# Patient Record
Sex: MALE | Race: WHITE | Employment: OTHER | URBAN - NONMETROPOLITAN AREA
[De-identification: names, ages, dates, MRNs, and addresses within clinical notes are randomized per-mention and may not be internally consistent; named-entity substitution may affect disease eponyms.]

---

## 2017-05-17 ENCOUNTER — PREP FOR PROCEDURE (OUTPATIENT)
Dept: SURGERY | Age: 67
End: 2017-05-17

## 2017-05-17 ENCOUNTER — OFFICE VISIT (OUTPATIENT)
Dept: SURGERY | Age: 67
End: 2017-05-17
Payer: MEDICARE

## 2017-05-17 VITALS
DIASTOLIC BLOOD PRESSURE: 70 MMHG | WEIGHT: 192 LBS | HEIGHT: 66 IN | BODY MASS INDEX: 30.86 KG/M2 | SYSTOLIC BLOOD PRESSURE: 120 MMHG | TEMPERATURE: 99 F

## 2017-05-17 DIAGNOSIS — N18.5 CKD (CHRONIC KIDNEY DISEASE) STAGE 5, GFR LESS THAN 15 ML/MIN (HCC): Primary | ICD-10-CM

## 2017-05-17 PROCEDURE — G8419 CALC BMI OUT NRM PARAM NOF/U: HCPCS | Performed by: PHYSICIAN ASSISTANT

## 2017-05-17 PROCEDURE — G8598 ASA/ANTIPLAT THER USED: HCPCS | Performed by: PHYSICIAN ASSISTANT

## 2017-05-17 PROCEDURE — G8427 DOCREV CUR MEDS BY ELIG CLIN: HCPCS | Performed by: PHYSICIAN ASSISTANT

## 2017-05-17 PROCEDURE — 4004F PT TOBACCO SCREEN RCVD TLK: CPT | Performed by: PHYSICIAN ASSISTANT

## 2017-05-17 PROCEDURE — 3017F COLORECTAL CA SCREEN DOC REV: CPT | Performed by: PHYSICIAN ASSISTANT

## 2017-05-17 PROCEDURE — 99203 OFFICE O/P NEW LOW 30 MIN: CPT | Performed by: PHYSICIAN ASSISTANT

## 2017-05-17 RX ORDER — CALCITRIOL 0.5 UG/1
0.5 CAPSULE, LIQUID FILLED ORAL
COMMUNITY
Start: 2016-10-26 | End: 2017-06-07 | Stop reason: ALTCHOICE

## 2017-05-17 RX ORDER — NITROGLYCERIN 0.4 MG/1
0.4 TABLET SUBLINGUAL
COMMUNITY
Start: 2015-02-03 | End: 2017-05-17

## 2017-05-17 RX ORDER — RANITIDINE 300 MG/1
300 TABLET ORAL DAILY
COMMUNITY
Start: 2016-11-02 | End: 2020-01-11

## 2017-05-17 RX ORDER — ALLOPURINOL 100 MG/1
100 TABLET ORAL 2 TIMES DAILY
COMMUNITY
Start: 2016-10-26

## 2017-05-17 RX ORDER — CLOPIDOGREL BISULFATE 75 MG/1
75 TABLET ORAL
COMMUNITY
Start: 2016-10-10 | End: 2017-06-07 | Stop reason: ALTCHOICE

## 2017-05-17 RX ORDER — AMLODIPINE BESYLATE 10 MG/1
5 TABLET ORAL DAILY
COMMUNITY
Start: 2016-11-12

## 2017-05-17 RX ORDER — SODIUM CHLORIDE, SODIUM LACTATE, POTASSIUM CHLORIDE, CALCIUM CHLORIDE 600; 310; 30; 20 MG/100ML; MG/100ML; MG/100ML; MG/100ML
INJECTION, SOLUTION INTRAVENOUS CONTINUOUS
Status: CANCELLED | OUTPATIENT
Start: 2017-05-17

## 2017-05-17 RX ORDER — SODIUM CHLORIDE 0.9 % (FLUSH) 0.9 %
10 SYRINGE (ML) INJECTION PRN
Status: CANCELLED | OUTPATIENT
Start: 2017-05-17

## 2017-05-17 RX ORDER — CARVEDILOL 12.5 MG/1
12.5 TABLET ORAL 2 TIMES DAILY WITH MEALS
COMMUNITY
Start: 2016-10-10

## 2017-05-17 RX ORDER — CLONIDINE HYDROCHLORIDE 0.1 MG/1
0.1 TABLET ORAL DAILY
COMMUNITY
Start: 2016-10-10 | End: 2017-07-14 | Stop reason: ALTCHOICE

## 2017-05-17 RX ORDER — ATORVASTATIN CALCIUM 40 MG/1
40 TABLET, FILM COATED ORAL DAILY
COMMUNITY
Start: 2016-11-12 | End: 2020-01-11

## 2017-05-17 RX ORDER — MINOXIDIL 2.5 MG/1
2.5 TABLET ORAL 2 TIMES DAILY
COMMUNITY
Start: 2016-10-10 | End: 2020-02-26

## 2017-05-17 RX ORDER — ERGOCALCIFEROL 1.25 MG/1
1.25 CAPSULE ORAL
COMMUNITY
Start: 2016-10-26 | End: 2018-07-16 | Stop reason: SDUPTHER

## 2017-05-17 RX ORDER — SODIUM CHLORIDE 0.9 % (FLUSH) 0.9 %
10 SYRINGE (ML) INJECTION EVERY 12 HOURS SCHEDULED
Status: CANCELLED | OUTPATIENT
Start: 2017-05-17

## 2017-05-17 RX ORDER — GABAPENTIN 300 MG/1
300 CAPSULE ORAL 2 TIMES DAILY
Status: ON HOLD | COMMUNITY
Start: 2016-10-10 | End: 2017-12-08 | Stop reason: ALTCHOICE

## 2017-05-22 ENCOUNTER — HOSPITAL ENCOUNTER (OUTPATIENT)
Dept: PREADMISSION TESTING | Age: 67
Discharge: HOME | End: 2017-05-22
Payer: COMMERCIAL

## 2017-05-22 VITALS — WEIGHT: 190 LBS | HEIGHT: 66 IN | BODY MASS INDEX: 30.53 KG/M2

## 2017-05-22 LAB
ANION GAP SERPL CALCULATED.3IONS-SCNC: 14 MMOL/L (ref 7–19)
BASOPHILS ABSOLUTE: 0 K/UL (ref 0–0.2)
BASOPHILS RELATIVE PERCENT: 0.7 % (ref 0–1)
BUN BLDV-MCNC: 42 MG/DL (ref 8–23)
CALCIUM SERPL-MCNC: 10.8 MG/DL (ref 8.8–10.2)
CHLORIDE BLD-SCNC: 107 MMOL/L (ref 98–111)
CO2: 25 MMOL/L (ref 22–29)
CREAT SERPL-MCNC: 5.1 MG/DL (ref 0.5–1.2)
EOSINOPHILS ABSOLUTE: 0.1 K/UL (ref 0–0.6)
EOSINOPHILS RELATIVE PERCENT: 1.6 % (ref 0–5)
GFR NON-AFRICAN AMERICAN: 11
GLUCOSE BLD-MCNC: 145 MG/DL (ref 74–109)
HCT VFR BLD CALC: 37.5 % (ref 42–52)
HEMOGLOBIN: 12.5 G/DL (ref 14–18)
LYMPHOCYTES ABSOLUTE: 1.4 K/UL (ref 1.1–4.5)
LYMPHOCYTES RELATIVE PERCENT: 23.3 % (ref 20–40)
MCH RBC QN AUTO: 32.3 PG (ref 27–31)
MCHC RBC AUTO-ENTMCNC: 33.3 G/DL (ref 33–37)
MCV RBC AUTO: 96.9 FL (ref 80–94)
MONOCYTES ABSOLUTE: 0.6 K/UL (ref 0–0.9)
MONOCYTES RELATIVE PERCENT: 9.2 % (ref 0–10)
NEUTROPHILS ABSOLUTE: 4 K/UL (ref 1.5–7.5)
NEUTROPHILS RELATIVE PERCENT: 64.7 % (ref 50–65)
PDW BLD-RTO: 14.6 % (ref 11.5–14.5)
PLATELET # BLD: 101 K/UL (ref 130–400)
PMV BLD AUTO: 12.3 FL (ref 7.4–10.4)
POTASSIUM SERPL-SCNC: 3.7 MMOL/L (ref 3.5–5)
RBC # BLD: 3.87 M/UL (ref 4.7–6.1)
SODIUM BLD-SCNC: 146 MMOL/L (ref 136–145)
WBC # BLD: 6.1 K/UL (ref 4.8–10.8)

## 2017-05-22 PROCEDURE — 85025 COMPLETE CBC W/AUTO DIFF WBC: CPT

## 2017-05-22 PROCEDURE — 80048 BASIC METABOLIC PNL TOTAL CA: CPT

## 2017-05-22 PROCEDURE — 93005 ELECTROCARDIOGRAM TRACING: CPT

## 2017-05-23 LAB
EKG P AXIS: NORMAL DEGREES
EKG P-R INTERVAL: NORMAL MS
EKG Q-T INTERVAL: 438 MS
EKG QRS DURATION: 160 MS
EKG QTC CALCULATION (BAZETT): 446 MS
EKG T AXIS: 110 DEGREES

## 2017-05-26 ENCOUNTER — ANESTHESIA (OUTPATIENT)
Dept: OPERATING ROOM | Age: 67
End: 2017-05-26
Payer: COMMERCIAL

## 2017-05-26 ENCOUNTER — ANESTHESIA EVENT (OUTPATIENT)
Dept: OPERATING ROOM | Age: 67
End: 2017-05-26
Payer: COMMERCIAL

## 2017-05-26 ENCOUNTER — HOSPITAL ENCOUNTER (OUTPATIENT)
Age: 67
Setting detail: OUTPATIENT SURGERY
Discharge: HOME | End: 2017-05-26
Attending: SURGERY | Admitting: SURGERY
Payer: COMMERCIAL

## 2017-05-26 VITALS
BODY MASS INDEX: 30.53 KG/M2 | SYSTOLIC BLOOD PRESSURE: 124 MMHG | TEMPERATURE: 98.3 F | RESPIRATION RATE: 16 BRPM | WEIGHT: 190 LBS | HEIGHT: 66 IN | OXYGEN SATURATION: 90 % | DIASTOLIC BLOOD PRESSURE: 62 MMHG | HEART RATE: 60 BPM

## 2017-05-26 VITALS
OXYGEN SATURATION: 97 % | DIASTOLIC BLOOD PRESSURE: 67 MMHG | SYSTOLIC BLOOD PRESSURE: 118 MMHG | RESPIRATION RATE: 1 BRPM | TEMPERATURE: 98 F

## 2017-05-26 PROBLEM — N18.5 CHRONIC KIDNEY DISEASE (CKD), STAGE V (HCC): Chronic | Status: ACTIVE | Noted: 2017-05-26

## 2017-05-26 LAB
APTT: 29.4 SEC (ref 26–36.2)
GLUCOSE BLD-MCNC: 96 MG/DL (ref 70–99)
INR BLD: 0.97 (ref 0.88–1.18)
PERFORMED ON: NORMAL
PROTHROMBIN TIME: 12.8 SEC (ref 12–14.6)

## 2017-05-26 PROCEDURE — 7100000001 HC PACU RECOVERY - ADDTL 15 MIN: Performed by: SURGERY

## 2017-05-26 PROCEDURE — 2720000003 HC MISC SUTURE/STAPLES/RELOADS/ETC: Performed by: SURGERY

## 2017-05-26 PROCEDURE — 2500000003 HC RX 250 WO HCPCS: Performed by: SURGERY

## 2017-05-26 PROCEDURE — 82948 REAGENT STRIP/BLOOD GLUCOSE: CPT

## 2017-05-26 PROCEDURE — 3700000001 HC ADD 15 MINUTES (ANESTHESIA): Performed by: SURGERY

## 2017-05-26 PROCEDURE — C1725 CATH, TRANSLUMIN NON-LASER: HCPCS | Performed by: SURGERY

## 2017-05-26 PROCEDURE — 7100000010 HC PHASE II RECOVERY - FIRST 15 MIN: Performed by: SURGERY

## 2017-05-26 PROCEDURE — 85730 THROMBOPLASTIN TIME PARTIAL: CPT

## 2017-05-26 PROCEDURE — 49324 LAP INSERT TUNNEL IP CATH: CPT | Performed by: PHYSICIAN ASSISTANT

## 2017-05-26 PROCEDURE — 6360000002 HC RX W HCPCS: Performed by: SURGERY

## 2017-05-26 PROCEDURE — 7100000000 HC PACU RECOVERY - FIRST 15 MIN: Performed by: SURGERY

## 2017-05-26 PROCEDURE — 49324 LAP INSERT TUNNEL IP CATH: CPT | Performed by: SURGERY

## 2017-05-26 PROCEDURE — 7100000011 HC PHASE II RECOVERY - ADDTL 15 MIN: Performed by: SURGERY

## 2017-05-26 PROCEDURE — C1752 CATH,HEMODIALYSIS,SHORT-TERM: HCPCS | Performed by: SURGERY

## 2017-05-26 PROCEDURE — 6360000002 HC RX W HCPCS: Performed by: NURSE ANESTHETIST, CERTIFIED REGISTERED

## 2017-05-26 PROCEDURE — 2580000003 HC RX 258: Performed by: ANESTHESIOLOGY

## 2017-05-26 PROCEDURE — 36415 COLL VENOUS BLD VENIPUNCTURE: CPT

## 2017-05-26 PROCEDURE — 3600000003 HC SURGERY LEVEL 3 BASE: Performed by: SURGERY

## 2017-05-26 PROCEDURE — 2720000001 HC MISC SURG SUPPLY STERILE $51-500: Performed by: SURGERY

## 2017-05-26 PROCEDURE — 2500000003 HC RX 250 WO HCPCS: Performed by: NURSE ANESTHETIST, CERTIFIED REGISTERED

## 2017-05-26 PROCEDURE — 3600000013 HC SURGERY LEVEL 3 ADDTL 15MIN: Performed by: SURGERY

## 2017-05-26 PROCEDURE — 3700000000 HC ANESTHESIA ATTENDED CARE: Performed by: SURGERY

## 2017-05-26 PROCEDURE — 85610 PROTHROMBIN TIME: CPT

## 2017-05-26 DEVICE — CATHETER PERITONEAL DLYS 35X53 MMX62 CM FLEX-NECK CLASSIC: Type: IMPLANTABLE DEVICE | Site: PERITONEUM | Status: FUNCTIONAL

## 2017-05-26 RX ORDER — SODIUM CHLORIDE 450 MG/100ML
INJECTION, SOLUTION INTRAVENOUS CONTINUOUS
Status: DISCONTINUED | OUTPATIENT
Start: 2017-05-26 | End: 2017-05-26 | Stop reason: HOSPADM

## 2017-05-26 RX ORDER — FENTANYL CITRATE 50 UG/ML
50 INJECTION, SOLUTION INTRAMUSCULAR; INTRAVENOUS
Status: DISCONTINUED | OUTPATIENT
Start: 2017-05-26 | End: 2017-05-26 | Stop reason: HOSPADM

## 2017-05-26 RX ORDER — MORPHINE SULFATE 4 MG/ML
4 INJECTION, SOLUTION INTRAMUSCULAR; INTRAVENOUS
Status: DISCONTINUED | OUTPATIENT
Start: 2017-05-26 | End: 2017-05-26 | Stop reason: HOSPADM

## 2017-05-26 RX ORDER — OXYCODONE HYDROCHLORIDE AND ACETAMINOPHEN 5; 325 MG/1; MG/1
1 TABLET ORAL EVERY 4 HOURS PRN
Status: DISCONTINUED | OUTPATIENT
Start: 2017-05-26 | End: 2017-05-26 | Stop reason: HOSPADM

## 2017-05-26 RX ORDER — LIDOCAINE HYDROCHLORIDE 10 MG/ML
1 INJECTION, SOLUTION EPIDURAL; INFILTRATION; INTRACAUDAL; PERINEURAL
Status: DISCONTINUED | OUTPATIENT
Start: 2017-05-26 | End: 2017-05-26 | Stop reason: HOSPADM

## 2017-05-26 RX ORDER — SODIUM CHLORIDE 0.9 % (FLUSH) 0.9 %
10 SYRINGE (ML) INJECTION PRN
Status: DISCONTINUED | OUTPATIENT
Start: 2017-05-26 | End: 2017-05-26 | Stop reason: HOSPADM

## 2017-05-26 RX ORDER — MORPHINE SULFATE 4 MG/ML
4 INJECTION, SOLUTION INTRAMUSCULAR; INTRAVENOUS EVERY 5 MIN PRN
Status: DISCONTINUED | OUTPATIENT
Start: 2017-05-26 | End: 2017-05-26 | Stop reason: HOSPADM

## 2017-05-26 RX ORDER — HYDRALAZINE HYDROCHLORIDE 20 MG/ML
5 INJECTION INTRAMUSCULAR; INTRAVENOUS EVERY 10 MIN PRN
Status: DISCONTINUED | OUTPATIENT
Start: 2017-05-26 | End: 2017-05-26 | Stop reason: HOSPADM

## 2017-05-26 RX ORDER — SCOLOPAMINE TRANSDERMAL SYSTEM 1 MG/1
1 PATCH, EXTENDED RELEASE TRANSDERMAL
Status: DISCONTINUED | OUTPATIENT
Start: 2017-05-26 | End: 2017-05-26 | Stop reason: HOSPADM

## 2017-05-26 RX ORDER — ROCURONIUM BROMIDE 10 MG/ML
INJECTION, SOLUTION INTRAVENOUS PRN
Status: DISCONTINUED | OUTPATIENT
Start: 2017-05-26 | End: 2017-05-26 | Stop reason: SDUPTHER

## 2017-05-26 RX ORDER — HEPARIN SODIUM,PORCINE 10 UNIT/ML
VIAL (ML) INTRAVENOUS PRN
Status: DISCONTINUED | OUTPATIENT
Start: 2017-05-26 | End: 2017-05-26 | Stop reason: HOSPADM

## 2017-05-26 RX ORDER — MORPHINE SULFATE 4 MG/ML
2 INJECTION, SOLUTION INTRAMUSCULAR; INTRAVENOUS EVERY 5 MIN PRN
Status: DISCONTINUED | OUTPATIENT
Start: 2017-05-26 | End: 2017-05-26 | Stop reason: HOSPADM

## 2017-05-26 RX ORDER — SODIUM CHLORIDE 0.9 % (FLUSH) 0.9 %
10 SYRINGE (ML) INJECTION EVERY 12 HOURS SCHEDULED
Status: DISCONTINUED | OUTPATIENT
Start: 2017-05-26 | End: 2017-05-26 | Stop reason: HOSPADM

## 2017-05-26 RX ORDER — LABETALOL HYDROCHLORIDE 5 MG/ML
5 INJECTION, SOLUTION INTRAVENOUS EVERY 10 MIN PRN
Status: DISCONTINUED | OUTPATIENT
Start: 2017-05-26 | End: 2017-05-26 | Stop reason: HOSPADM

## 2017-05-26 RX ORDER — PROPOFOL 10 MG/ML
INJECTION, EMULSION INTRAVENOUS PRN
Status: DISCONTINUED | OUTPATIENT
Start: 2017-05-26 | End: 2017-05-26 | Stop reason: SDUPTHER

## 2017-05-26 RX ORDER — OXYCODONE HYDROCHLORIDE AND ACETAMINOPHEN 5; 325 MG/1; MG/1
TABLET ORAL
Qty: 15 TABLET | Refills: 0 | Status: SHIPPED | OUTPATIENT
Start: 2017-05-26 | End: 2017-06-07 | Stop reason: ALTCHOICE

## 2017-05-26 RX ORDER — SODIUM CHLORIDE, SODIUM LACTATE, POTASSIUM CHLORIDE, CALCIUM CHLORIDE 600; 310; 30; 20 MG/100ML; MG/100ML; MG/100ML; MG/100ML
INJECTION, SOLUTION INTRAVENOUS CONTINUOUS
Status: DISCONTINUED | OUTPATIENT
Start: 2017-05-26 | End: 2017-05-26 | Stop reason: HOSPADM

## 2017-05-26 RX ORDER — GLYCOPYRROLATE 0.2 MG/ML
INJECTION INTRAMUSCULAR; INTRAVENOUS PRN
Status: DISCONTINUED | OUTPATIENT
Start: 2017-05-26 | End: 2017-05-26 | Stop reason: SDUPTHER

## 2017-05-26 RX ORDER — MEPERIDINE HYDROCHLORIDE 50 MG/ML
12.5 INJECTION INTRAMUSCULAR; INTRAVENOUS; SUBCUTANEOUS EVERY 5 MIN PRN
Status: DISCONTINUED | OUTPATIENT
Start: 2017-05-26 | End: 2017-05-26 | Stop reason: HOSPADM

## 2017-05-26 RX ORDER — METOCLOPRAMIDE HYDROCHLORIDE 5 MG/ML
10 INJECTION INTRAMUSCULAR; INTRAVENOUS
Status: DISCONTINUED | OUTPATIENT
Start: 2017-05-26 | End: 2017-05-26 | Stop reason: HOSPADM

## 2017-05-26 RX ORDER — OXYCODONE HYDROCHLORIDE AND ACETAMINOPHEN 5; 325 MG/1; MG/1
2 TABLET ORAL EVERY 4 HOURS PRN
Status: DISCONTINUED | OUTPATIENT
Start: 2017-05-26 | End: 2017-05-26 | Stop reason: HOSPADM

## 2017-05-26 RX ORDER — PROMETHAZINE HYDROCHLORIDE 25 MG/ML
6.25 INJECTION, SOLUTION INTRAMUSCULAR; INTRAVENOUS
Status: DISCONTINUED | OUTPATIENT
Start: 2017-05-26 | End: 2017-05-26 | Stop reason: HOSPADM

## 2017-05-26 RX ORDER — MIDAZOLAM HYDROCHLORIDE 1 MG/ML
2 INJECTION INTRAMUSCULAR; INTRAVENOUS
Status: DISCONTINUED | OUTPATIENT
Start: 2017-05-26 | End: 2017-05-26 | Stop reason: HOSPADM

## 2017-05-26 RX ORDER — LIDOCAINE HYDROCHLORIDE 10 MG/ML
INJECTION, SOLUTION INFILTRATION; PERINEURAL PRN
Status: DISCONTINUED | OUTPATIENT
Start: 2017-05-26 | End: 2017-05-26 | Stop reason: HOSPADM

## 2017-05-26 RX ORDER — FENTANYL CITRATE 50 UG/ML
INJECTION, SOLUTION INTRAMUSCULAR; INTRAVENOUS PRN
Status: DISCONTINUED | OUTPATIENT
Start: 2017-05-26 | End: 2017-05-26 | Stop reason: SDUPTHER

## 2017-05-26 RX ORDER — DIPHENHYDRAMINE HYDROCHLORIDE 50 MG/ML
12.5 INJECTION INTRAMUSCULAR; INTRAVENOUS
Status: DISCONTINUED | OUTPATIENT
Start: 2017-05-26 | End: 2017-05-26 | Stop reason: HOSPADM

## 2017-05-26 RX ORDER — BUPIVACAINE HYDROCHLORIDE 5 MG/ML
INJECTION, SOLUTION PERINEURAL PRN
Status: DISCONTINUED | OUTPATIENT
Start: 2017-05-26 | End: 2017-05-26 | Stop reason: HOSPADM

## 2017-05-26 RX ADMIN — LIDOCAINE HYDROCHLORIDE 5 MG: 10 INJECTION, SOLUTION INFILTRATION; PERINEURAL at 13:24

## 2017-05-26 RX ADMIN — FENTANYL CITRATE 25 MCG: 50 INJECTION INTRAMUSCULAR; INTRAVENOUS at 13:44

## 2017-05-26 RX ADMIN — SODIUM CHLORIDE: 4.5 INJECTION, SOLUTION INTRAVENOUS at 09:06

## 2017-05-26 RX ADMIN — ROCURONIUM BROMIDE 10 MG: 10 INJECTION INTRAVENOUS at 13:24

## 2017-05-26 RX ADMIN — NEOSTIGMINE METHYLSULFATE 3 MG: 1 INJECTION, SOLUTION INTRAMUSCULAR; INTRAVENOUS; SUBCUTANEOUS at 14:11

## 2017-05-26 RX ADMIN — FENTANYL CITRATE 75 MCG: 50 INJECTION INTRAMUSCULAR; INTRAVENOUS at 13:23

## 2017-05-26 RX ADMIN — GLYCOPYRROLATE 0.3 MG: 0.2 INJECTION, SOLUTION INTRAMUSCULAR; INTRAVENOUS at 14:11

## 2017-05-26 RX ADMIN — PROPOFOL 100 MG: 10 INJECTION, EMULSION INTRAVENOUS at 13:24

## 2017-05-26 RX ADMIN — SODIUM CHLORIDE: 4.5 INJECTION, SOLUTION INTRAVENOUS at 13:20

## 2017-05-26 RX ADMIN — CEFAZOLIN SODIUM 1 G: 1 INJECTION, SOLUTION INTRAVENOUS at 13:36

## 2017-05-26 ASSESSMENT — PAIN DESCRIPTION - LOCATION: LOCATION: ABDOMEN

## 2017-05-26 ASSESSMENT — COPD QUESTIONNAIRES: CAT_SEVERITY: SEVERE

## 2017-05-26 ASSESSMENT — PAIN SCALES - GENERAL: PAINLEVEL_OUTOF10: 2

## 2017-05-26 ASSESSMENT — PAIN - FUNCTIONAL ASSESSMENT
PAIN_FUNCTIONAL_ASSESSMENT: 0-10

## 2017-05-26 ASSESSMENT — PAIN DESCRIPTION - DESCRIPTORS: DESCRIPTORS: ACHING

## 2017-05-31 ENCOUNTER — HOSPITAL ENCOUNTER (OUTPATIENT)
Dept: ULTRASOUND IMAGING | Age: 67
Discharge: HOME | End: 2017-05-31
Payer: COMMERCIAL

## 2017-05-31 ENCOUNTER — HOSPITAL ENCOUNTER (OUTPATIENT)
Dept: GENERAL RADIOLOGY | Age: 67
Discharge: HOME | End: 2017-05-31
Payer: COMMERCIAL

## 2017-05-31 DIAGNOSIS — K59.09 CHRONIC CONSTIPATION: ICD-10-CM

## 2017-05-31 DIAGNOSIS — R10.30 LOWER ABDOMINAL PAIN: ICD-10-CM

## 2017-05-31 PROCEDURE — 74000 XR ABDOMEN LIMITED (KUB): CPT

## 2017-06-05 ENCOUNTER — TELEPHONE (OUTPATIENT)
Dept: CARDIOLOGY | Facility: CLINIC | Age: 67
End: 2017-06-05

## 2017-06-05 NOTE — TELEPHONE ENCOUNTER
----- Message from Karissa Rhodes CMA sent at 6/5/2017  2:03 PM CDT -----  [facility], WANTS YOU TO LOOK AT PT BLOOD PLATELETS.  (THIS IS SCANNED INTO CHART AND I ALSO LAID A COPY ON YOUR DESK).       Please check with Dr. De Santiago's office to see if they are ok with him stopping Plavix due to platelets dropping to 91,000. His baseline has been 100,000 to 120,000. He is now on hemodialysis. I am going to try to keep him on aspirin. JLG 6/5/17 [id number]

## 2017-06-06 NOTE — TELEPHONE ENCOUNTER
Spoke Tiara with Dr De Santiago off.  She states it's okay for pt to stop the Plavix,   i then called Tiara with dialysis and informed her about stopping plavix but he needs to continue the ASA.   She voiced understanding.

## 2017-06-07 ENCOUNTER — OFFICE VISIT (OUTPATIENT)
Dept: SURGERY | Age: 67
End: 2017-06-07

## 2017-06-07 VITALS
BODY MASS INDEX: 31.02 KG/M2 | HEIGHT: 66 IN | DIASTOLIC BLOOD PRESSURE: 80 MMHG | TEMPERATURE: 99 F | WEIGHT: 193 LBS | SYSTOLIC BLOOD PRESSURE: 140 MMHG

## 2017-06-07 DIAGNOSIS — N18.5 CHRONIC KIDNEY DISEASE (CKD), STAGE V (HCC): Primary | ICD-10-CM

## 2017-06-07 PROCEDURE — 99024 POSTOP FOLLOW-UP VISIT: CPT | Performed by: PHYSICIAN ASSISTANT

## 2017-06-12 ENCOUNTER — CLINICAL SUPPORT (OUTPATIENT)
Dept: CARDIOLOGY | Facility: CLINIC | Age: 67
End: 2017-06-12

## 2017-06-12 DIAGNOSIS — Z95.0 PACEMAKER: ICD-10-CM

## 2017-06-12 PROCEDURE — 93288 INTERROG EVL PM/LDLS PM IP: CPT | Performed by: NURSE PRACTITIONER

## 2017-06-12 NOTE — PROGRESS NOTES
Pacemaker Evaluation Report    June 12, 2017    Indication for pacemaker: sinus node dysfunction - tachy/yesi    Implanting MD: Dr. Howe    : Medtronic  Model: Adapta [id number]    Implant Date: 11/21/14    Reason For Evaluation: routine      DIAGNOSTIC DATA    Atrial paced: 43.8%  Ventricular paced: 99.4%    Battery status: satisfactory  Voltage: 2.79 V  Estimated battery life: 11.5 years    One episode of atrial fibrillation.    FINAL PARAMETERS    Changes made: none    Conclusions: normal pacemaker function, stable pacing and sensing thresholds and adequate battery reserve    Return in about 6 months (around 12/12/2017) for [facility], 1 year [facility].

## 2017-07-12 ENCOUNTER — OFFICE VISIT (OUTPATIENT)
Dept: SURGERY | Age: 67
End: 2017-07-12
Payer: MEDICARE

## 2017-07-12 ENCOUNTER — PREP FOR PROCEDURE (OUTPATIENT)
Dept: SURGERY | Age: 67
End: 2017-07-12

## 2017-07-12 VITALS
TEMPERATURE: 97.7 F | HEIGHT: 66 IN | DIASTOLIC BLOOD PRESSURE: 70 MMHG | WEIGHT: 186 LBS | SYSTOLIC BLOOD PRESSURE: 100 MMHG | BODY MASS INDEX: 29.89 KG/M2

## 2017-07-12 DIAGNOSIS — N18.5 CHRONIC KIDNEY DISEASE (CKD), STAGE V (HCC): Primary | ICD-10-CM

## 2017-07-12 PROCEDURE — 4040F PNEUMOC VAC/ADMIN/RCVD: CPT | Performed by: PHYSICIAN ASSISTANT

## 2017-07-12 PROCEDURE — 4004F PT TOBACCO SCREEN RCVD TLK: CPT | Performed by: PHYSICIAN ASSISTANT

## 2017-07-12 PROCEDURE — 99212 OFFICE O/P EST SF 10 MIN: CPT | Performed by: PHYSICIAN ASSISTANT

## 2017-07-12 PROCEDURE — G8419 CALC BMI OUT NRM PARAM NOF/U: HCPCS | Performed by: PHYSICIAN ASSISTANT

## 2017-07-12 PROCEDURE — G8427 DOCREV CUR MEDS BY ELIG CLIN: HCPCS | Performed by: PHYSICIAN ASSISTANT

## 2017-07-12 PROCEDURE — 3017F COLORECTAL CA SCREEN DOC REV: CPT | Performed by: PHYSICIAN ASSISTANT

## 2017-07-12 PROCEDURE — 1123F ACP DISCUSS/DSCN MKR DOCD: CPT | Performed by: PHYSICIAN ASSISTANT

## 2017-07-12 PROCEDURE — G8598 ASA/ANTIPLAT THER USED: HCPCS | Performed by: PHYSICIAN ASSISTANT

## 2017-07-12 RX ORDER — SODIUM CHLORIDE, SODIUM LACTATE, POTASSIUM CHLORIDE, CALCIUM CHLORIDE 600; 310; 30; 20 MG/100ML; MG/100ML; MG/100ML; MG/100ML
INJECTION, SOLUTION INTRAVENOUS CONTINUOUS
Status: CANCELLED | OUTPATIENT
Start: 2017-07-12

## 2017-07-12 RX ORDER — SODIUM CHLORIDE 0.9 % (FLUSH) 0.9 %
10 SYRINGE (ML) INJECTION EVERY 12 HOURS SCHEDULED
Status: CANCELLED | OUTPATIENT
Start: 2017-07-12

## 2017-07-12 RX ORDER — SODIUM CHLORIDE 0.9 % (FLUSH) 0.9 %
10 SYRINGE (ML) INJECTION PRN
Status: CANCELLED | OUTPATIENT
Start: 2017-07-12

## 2017-07-14 ENCOUNTER — OFFICE VISIT (OUTPATIENT)
Dept: CARDIOLOGY | Facility: CLINIC | Age: 67
End: 2017-07-14

## 2017-07-14 VITALS
WEIGHT: 192 LBS | OXYGEN SATURATION: 97 % | DIASTOLIC BLOOD PRESSURE: 72 MMHG | HEART RATE: 81 BPM | SYSTOLIC BLOOD PRESSURE: 118 MMHG | BODY MASS INDEX: 30.86 KG/M2 | HEIGHT: 66 IN

## 2017-07-14 DIAGNOSIS — N18.5 CHRONIC KIDNEY DISEASE, STAGE V (HCC): Chronic | ICD-10-CM

## 2017-07-14 DIAGNOSIS — Z95.1 S/P CABG X 3: Chronic | ICD-10-CM

## 2017-07-14 DIAGNOSIS — I25.10 CORONARY ARTERY DISEASE INVOLVING NATIVE CORONARY ARTERY OF NATIVE HEART WITHOUT ANGINA PECTORIS: Primary | Chronic | ICD-10-CM

## 2017-07-14 DIAGNOSIS — I10 ESSENTIAL HYPERTENSION: Chronic | ICD-10-CM

## 2017-07-14 DIAGNOSIS — I50.32 CHRONIC DIASTOLIC HEART FAILURE (HCC): Chronic | ICD-10-CM

## 2017-07-14 DIAGNOSIS — Z72.0 TOBACCO ABUSE: Chronic | ICD-10-CM

## 2017-07-14 DIAGNOSIS — E78.2 MIXED DYSLIPIDEMIA: Chronic | ICD-10-CM

## 2017-07-14 PROCEDURE — 99214 OFFICE O/P EST MOD 30 MIN: CPT | Performed by: NURSE PRACTITIONER

## 2017-07-14 RX ORDER — GENTAMICIN SULFATE 1 MG/G
0.1 CREAM TOPICAL DAILY
Refills: 3 | COMMUNITY
Start: 2017-06-06

## 2017-07-14 NOTE — PROGRESS NOTES
Subjective:     Encounter Date:07/14/2017    Chief Complaint:    Patient ID: Blaise Vanessa is a 67 y.o. male here today for cardiac follow-up.    HPI     Congestive Heart Failure    Additional comments: chronic diastolic, fluid better controlled with peritoneal dialysis since 5/2017           Coronary Artery Disease    Additional comments: no chest discomfort, hx CABG 2004, Dr. Terry       Last edited by CASS March on 7/14/2017  2:35 PM. (History)        History:   Past Medical History:   Diagnosis Date   • Arthritis    • CAD (coronary artery disease)    • Cataract    • CHF (congestive heart failure)    • Chronic renal disease, stage IV    • Diabetes mellitus    • Edema    • Hyperlipidemia    • Hyperparathyroidism    • Hypertension    • Hyperuricemia    • Peritoneal dialysis catheter in place    • Vitamin D deficiency      Past Surgical History:   Procedure Laterality Date   • CARDIAC PACEMAKER PLACEMENT     • CATARACT EXTRACTION, BILATERAL     • CORONARY ARTERY BYPASS GRAFT     • HERNIA REPAIR     • KNEE SURGERY     • WRIST SURGERY       Social History     Social History   • Marital status:      Spouse name: N/A   • Number of children: N/A   • Years of education: N/A     Occupational History   • Disabled      Social History Main Topics   • Smoking status: Current Every Day Smoker     Packs/day: 1.00     Years: 50.00     Types: Cigarettes     Start date: 1966   • Smokeless tobacco: Never Used      Comment: up to 2 ppd, has quit for a year in the past   • Alcohol use No      Comment: previously drank beer and whiskey but not in excess   • Drug use: No   • Sexual activity: Defer     Other Topics Concern   • Not on file     Social History Narrative     Family History   Problem Relation Age of Onset   • Heart disease Mother    • Heart attack Mother    • Heart disease Father    • Heart attack Father        Outpatient Prescriptions Marked as Taking for the 7/14/17 encounter (Office Visit) with  CASS March   Medication Sig Dispense Refill   • ADVAIR DISKUS 250-50 MCG/DOSE DISKUS Inhale 2 puffs As Needed.     • allopurinol (ZYLOPRIM) 100 MG tablet 100 mg 2 (Two) Times a Day.  5   • amLODIPine (NORVASC) 10 MG tablet Take 5 mg by mouth 2 (Two) Times a Day. 1/2 tab bid      • aspirin 81 MG tablet Take 81 mg by mouth Daily.     • atorvastatin (LIPITOR) 40 MG tablet Take 40 mg by mouth Daily.     • carvedilol (COREG) 12.5 MG tablet 12.5 mg 2 (Two) Times a Day With Meals.     • gabapentin (NEURONTIN) 300 MG capsule Take 300 mg by mouth 2 (Two) Times a Day.     • gentamicin (GARAMYCIN) 0.1 % cream Apply 0.1 application topically Daily.  3   • JANUVIA 25 MG tablet 25 mg Daily.  2   • minoxidil (LONITEN) 2.5 MG tablet 2.5 mg 2 (Two) Times a Day.     • nitroglycerin (NITROSTAT) 0.4 MG SL tablet 0.4 mg As Needed.     • O2 (OXYGEN) Inhale 2 L Every Night.     • ONE TOUCH ULTRA TEST test strip USE TO TEST BLOOD SUGAR DAILY  0   • raNITIdine (ZANTAC) 300 MG tablet Take 300 mg by mouth Daily.  5   • vitamin D (ERGOCALCIFEROL) 75813 UNITS capsule capsule Take 50,000 Units by mouth Every 30 (Thirty) Days.  1       Review of Systems:  Review of Systems   Constitution: Positive for malaise/fatigue. Negative for chills, decreased appetite, fever, weight gain and weight loss.   HENT: Negative for congestion, headaches (occ), hearing loss, nosebleeds, sore throat and tinnitus.    Eyes: Positive for blurred vision. Negative for double vision, pain, photophobia and visual disturbance.   Cardiovascular: Positive for leg swelling. Negative for chest pain, claudication, cyanosis, dyspnea on exertion, irregular heartbeat, near-syncope, orthopnea, palpitations, paroxysmal nocturnal dyspnea and syncope.   Respiratory: Positive for wheezing. Negative for cough, hemoptysis and shortness of breath.    Endocrine: Negative for cold intolerance, heat intolerance and polyuria.   Hematologic/Lymphatic: Negative for adenopathy and  "bleeding problem. Bruises/bleeds easily.   Skin: Positive for dry skin. Negative for flushing, itching and rash.   Musculoskeletal: Positive for back pain, joint pain and muscle cramps. Negative for arthritis, joint swelling, muscle weakness, neck pain and stiffness.   Gastrointestinal: Positive for heartburn. Negative for abdominal pain, anorexia, change in bowel habit, constipation, diarrhea, melena, nausea and vomiting.   Genitourinary: Negative for dysuria, flank pain and hematuria.   Neurological: Positive for dizziness (when getting up in the mornings ), light-headedness, loss of balance and numbness (feet ). Negative for seizures and tremors.   Psychiatric/Behavioral: Negative for altered mental status, depression, hallucinations and substance abuse. The patient does not have insomnia and is not nervous/anxious.    Allergic/Immunologic: Negative for hives and persistent infections.   All other systems reviewed and are negative.         Objective:     /72 (BP Location: Left arm, Patient Position: Sitting, Cuff Size: Large Adult)  Pulse 81  Ht 66\" (167.6 cm)  Wt 192 lb (87.1 kg)  SpO2 97%  BMI 30.99 kg/m2      Physical Exam   Constitutional: He is oriented to person, place, and time. He appears well-developed and well-nourished.   Eyes: No scleral icterus.   Neck: No JVD present.   Cardiovascular: Normal rate, regular rhythm, normal heart sounds and intact distal pulses.  Exam reveals no gallop and no friction rub.    No murmur heard.  Pulmonary/Chest: Effort normal and breath sounds normal.   Musculoskeletal: He exhibits no edema.   Neurological: He is alert and oriented to person, place, and time.   Skin: Skin is warm and dry.   Psychiatric: He has a normal mood and affect.   Vitals reviewed.    Lab/Diagnostics Review:   05/30/2017   CBC WBC 4460, hemoglobin 11.7, hematocrit 34.7%, platelets 91,000  Chemistry sodium 145, potassium 4, chloride 111, BUN 42, creatinine 4.61  Magnesium 2.5  Iron 33, " TIBC 245, transferrin saturation 13%  Hemoglobin A1c 5.9%, folate 9.2, vitamin B12 358, vitamin D 25 OH 40.5    05/01/2017   CBC WBC 5600, 1113, hematocrit 40.4%, platelets 90,000  Renal panel  sodium 149, potassium 4, chloride 111, CO2 27.4, BUN 51, creatinine 4.87, albumin 3.5, calcium 11.7, glucose 102, phosphorus 4.5, uric acid 5    12/13/2016 lipid panel total cholesterol 128, triglycerides 126, HDL 32, LDL 71    Echocardiogram:  · 10/7/2014 Echocardiogram. [facility]. Normal LV systolic function, LVEF 60%. No regional wall motion abnormalities. Mild to moderate LVH (septal wall thickness 1.6, posterior wall thickness 1.4).Grade 2 diastolic dysfunction. Normal RV size and systolic function. Mild left atrial dilation. Aortic valve sclerosis without stenosis. Mitral annular calcification. Trace MR. Mild TR. Mild PI. Estimated RVSP 33 mmHg.  · 12/5/2016 Echocardiogram. [facility]. Normal LV Systolic Function, LVEF 55-60% grade 1 diastolic dysfunction. Normal RV size and systolic function. Mild left atrial dilation. Mild aortic valve sclerosis. Mild mitral calcification. Mild MR.     2/5/2016 Lexiscan Nuclear Stress Test. [facility]. Inferior lateral infarct with yahaira-infarct ischemia. LVEF 55%.    10/4/2016 EKG. Atrial fibrillation with ventricular pacing, rate 60 BPM.    12/13/2016 Pulmonary Function Studies. [facility]. FEV1 1.62 (62%), FVC 2.35 (73%), FEV1/FVC 69%, FEF 25-75% 0.98 (36%). TLC 4.48 (83%), vital capacity 2.47 (76%), residual volume 2.01 (96%). DLCO 12.1 (55%), DLCO/VA 4.33 (114%).    12/13/2016 Arterial Blood Gas. [facility]. PH 7.31, PCO2 45, PO2 55, bicarbonate 22.7, base excess -3.7, oxygen saturation 88.5%.    12/5/2016. PA and Lateral Chest X-Ray. [facility]. Left basilar atelectasis, pneumonitis, and/or pulmonary parenchymal scarring which is slightly improved compared to prior examination dated 10/8/2016. Sternotomy wires noted. Presence of dual chamber pacemaker    01/31/2015 cardiac catheterization 2.5x33 mm [facility]  drug-eluting stent to mid right coronary artery.  Patent LIMA to LAD with native LAD mid 80% stenosis.  Diffuse luminal irregularities left circumflex mid marginal branch.  Right coronary artery % stenosis with a long 90% mid right coronary artery stenosis prior to the PDA.  CARMELO widely patent nongrafted suitable for bypass grafting in the future if needed.  Occluded saphenous vein graft to the obtuse marginal.  Occluded saphenous vein graft to the PDA.  Inferior basilar akinesis with EF 45%.  Keli.    Procedures: none in office today          Assessment/Plan:         Blaise was seen today for congestive heart failure and coronary artery disease.    Diagnoses and all orders for this visit:    Coronary artery disease involving native coronary artery of native heart without angina pectoris  Comments:  stable without angina    Chronic diastolic heart failure  Comments:  fluid improved since starting peritoneal dialysis 5/2017    Mixed dyslipidemia  Comments:  controlled with statin per 12/2016 labs except low HDL, encouraged increased aerobic activity as able    Essential hypertension  Comments:  well controlled    Chronic kidney disease, stage V  Comments:  on peritoneal dialysis    Tobacco abuse  Comments:  encouraged cessation but he isn't interested at this time    S/P CABG x 3  Comments:  per Dr. Terry        Return in about 6 months (around 1/14/2018) for Recheck.           Cindy Chavarria, APRN, ACNP-BC, CHFN-BC

## 2017-07-17 ENCOUNTER — ANESTHESIA (OUTPATIENT)
Dept: OPERATING ROOM | Age: 67
End: 2017-07-17
Payer: COMMERCIAL

## 2017-07-17 ENCOUNTER — HOSPITAL ENCOUNTER (OUTPATIENT)
Age: 67
Setting detail: OUTPATIENT SURGERY
Discharge: HOME | End: 2017-07-17
Attending: SURGERY | Admitting: SURGERY
Payer: COMMERCIAL

## 2017-07-17 ENCOUNTER — ANESTHESIA EVENT (OUTPATIENT)
Dept: OPERATING ROOM | Age: 67
End: 2017-07-17
Payer: COMMERCIAL

## 2017-07-17 VITALS
RESPIRATION RATE: 9 BRPM | DIASTOLIC BLOOD PRESSURE: 82 MMHG | OXYGEN SATURATION: 100 % | TEMPERATURE: 96.7 F | SYSTOLIC BLOOD PRESSURE: 142 MMHG

## 2017-07-17 VITALS
RESPIRATION RATE: 16 BRPM | DIASTOLIC BLOOD PRESSURE: 88 MMHG | WEIGHT: 186 LBS | BODY MASS INDEX: 29.89 KG/M2 | OXYGEN SATURATION: 94 % | HEART RATE: 70 BPM | SYSTOLIC BLOOD PRESSURE: 134 MMHG | HEIGHT: 66 IN | TEMPERATURE: 96.6 F

## 2017-07-17 LAB
ANION GAP SERPL CALCULATED.3IONS-SCNC: 13 MMOL/L (ref 7–19)
APTT: 27.1 SEC (ref 26–36.2)
BUN BLDV-MCNC: 42 MG/DL (ref 8–23)
CALCIUM SERPL-MCNC: 9 MG/DL (ref 8.8–10.2)
CHLORIDE BLD-SCNC: 105 MMOL/L (ref 98–111)
CO2: 26 MMOL/L (ref 22–29)
CREAT SERPL-MCNC: 4 MG/DL (ref 0.5–1.2)
GFR NON-AFRICAN AMERICAN: 15
GLUCOSE BLD-MCNC: 102 MG/DL (ref 74–109)
HCT VFR BLD CALC: 39.9 % (ref 42–52)
HEMOGLOBIN: 13.6 G/DL (ref 14–18)
INR BLD: 0.93 (ref 0.88–1.18)
MCH RBC QN AUTO: 33.3 PG (ref 27–31)
MCHC RBC AUTO-ENTMCNC: 34.1 G/DL (ref 33–37)
MCV RBC AUTO: 97.6 FL (ref 80–94)
PDW BLD-RTO: 15.2 % (ref 11.5–14.5)
PLATELET # BLD: 105 K/UL (ref 130–400)
PMV BLD AUTO: 12 FL (ref 9.4–12.4)
POTASSIUM SERPL-SCNC: 3.5 MMOL/L (ref 3.5–4.9)
PROTHROMBIN TIME: 12.4 SEC (ref 12–14.6)
RBC # BLD: 4.09 M/UL (ref 4.7–6.1)
SODIUM BLD-SCNC: 144 MMOL/L (ref 136–145)
WBC # BLD: 7 K/UL (ref 4.8–10.8)

## 2017-07-17 PROCEDURE — 7100000001 HC PACU RECOVERY - ADDTL 15 MIN: Performed by: SURGERY

## 2017-07-17 PROCEDURE — 3700000001 HC ADD 15 MINUTES (ANESTHESIA): Performed by: SURGERY

## 2017-07-17 PROCEDURE — 85027 COMPLETE CBC AUTOMATED: CPT

## 2017-07-17 PROCEDURE — 85730 THROMBOPLASTIN TIME PARTIAL: CPT

## 2017-07-17 PROCEDURE — 36415 COLL VENOUS BLD VENIPUNCTURE: CPT

## 2017-07-17 PROCEDURE — 3600000004 HC SURGERY LEVEL 4 BASE: Performed by: SURGERY

## 2017-07-17 PROCEDURE — 7100000000 HC PACU RECOVERY - FIRST 15 MIN: Performed by: SURGERY

## 2017-07-17 PROCEDURE — 3700000000 HC ANESTHESIA ATTENDED CARE: Performed by: SURGERY

## 2017-07-17 PROCEDURE — 2580000003 HC RX 258: Performed by: PHYSICIAN ASSISTANT

## 2017-07-17 PROCEDURE — 2500000003 HC RX 250 WO HCPCS: Performed by: SURGERY

## 2017-07-17 PROCEDURE — 2580000003 HC RX 258: Performed by: SURGERY

## 2017-07-17 PROCEDURE — 80048 BASIC METABOLIC PNL TOTAL CA: CPT

## 2017-07-17 PROCEDURE — 2500000003 HC RX 250 WO HCPCS: Performed by: NURSE ANESTHETIST, CERTIFIED REGISTERED

## 2017-07-17 PROCEDURE — 6360000002 HC RX W HCPCS: Performed by: NURSE ANESTHETIST, CERTIFIED REGISTERED

## 2017-07-17 PROCEDURE — 6360000002 HC RX W HCPCS: Performed by: ANESTHESIOLOGY

## 2017-07-17 PROCEDURE — 2500000003 HC RX 250 WO HCPCS: Performed by: ANESTHESIOLOGY

## 2017-07-17 PROCEDURE — 7100000010 HC PHASE II RECOVERY - FIRST 15 MIN: Performed by: SURGERY

## 2017-07-17 PROCEDURE — 2720000001 HC MISC SURG SUPPLY STERILE $51-500: Performed by: SURGERY

## 2017-07-17 PROCEDURE — 3600000014 HC SURGERY LEVEL 4 ADDTL 15MIN: Performed by: SURGERY

## 2017-07-17 PROCEDURE — 85610 PROTHROMBIN TIME: CPT

## 2017-07-17 PROCEDURE — 6360000002 HC RX W HCPCS: Performed by: SURGERY

## 2017-07-17 PROCEDURE — C2628 CATHETER, OCCLUSION: HCPCS | Performed by: SURGERY

## 2017-07-17 PROCEDURE — 49320 DIAG LAPARO SEPARATE PROC: CPT | Performed by: SURGERY

## 2017-07-17 RX ORDER — SODIUM CHLORIDE 450 MG/100ML
INJECTION, SOLUTION INTRAVENOUS CONTINUOUS
Status: DISCONTINUED | OUTPATIENT
Start: 2017-07-17 | End: 2017-07-17 | Stop reason: HOSPADM

## 2017-07-17 RX ORDER — LABETALOL HYDROCHLORIDE 5 MG/ML
5 INJECTION, SOLUTION INTRAVENOUS EVERY 10 MIN PRN
Status: DISCONTINUED | OUTPATIENT
Start: 2017-07-17 | End: 2017-07-17 | Stop reason: HOSPADM

## 2017-07-17 RX ORDER — LIDOCAINE HYDROCHLORIDE 10 MG/ML
1 INJECTION, SOLUTION EPIDURAL; INFILTRATION; INTRACAUDAL; PERINEURAL
Status: COMPLETED | OUTPATIENT
Start: 2017-07-17 | End: 2017-07-17

## 2017-07-17 RX ORDER — SODIUM CHLORIDE 0.9 % (FLUSH) 0.9 %
10 SYRINGE (ML) INJECTION 2 TIMES DAILY
Status: DISCONTINUED | OUTPATIENT
Start: 2017-07-17 | End: 2017-07-17 | Stop reason: HOSPADM

## 2017-07-17 RX ORDER — CEFAZOLIN SODIUM 1 G/3ML
INJECTION, POWDER, FOR SOLUTION INTRAMUSCULAR; INTRAVENOUS PRN
Status: DISCONTINUED | OUTPATIENT
Start: 2017-07-17 | End: 2017-07-17 | Stop reason: SDUPTHER

## 2017-07-17 RX ORDER — PROPOFOL 10 MG/ML
INJECTION, EMULSION INTRAVENOUS PRN
Status: DISCONTINUED | OUTPATIENT
Start: 2017-07-17 | End: 2017-07-17 | Stop reason: SDUPTHER

## 2017-07-17 RX ORDER — ROCURONIUM BROMIDE 10 MG/ML
INJECTION, SOLUTION INTRAVENOUS PRN
Status: DISCONTINUED | OUTPATIENT
Start: 2017-07-17 | End: 2017-07-17 | Stop reason: SDUPTHER

## 2017-07-17 RX ORDER — GLYCOPYRROLATE 0.2 MG/ML
INJECTION INTRAMUSCULAR; INTRAVENOUS PRN
Status: DISCONTINUED | OUTPATIENT
Start: 2017-07-17 | End: 2017-07-17 | Stop reason: SDUPTHER

## 2017-07-17 RX ORDER — BUPIVACAINE HYDROCHLORIDE 5 MG/ML
INJECTION, SOLUTION PERINEURAL PRN
Status: DISCONTINUED | OUTPATIENT
Start: 2017-07-17 | End: 2017-07-17 | Stop reason: HOSPADM

## 2017-07-17 RX ORDER — SODIUM CHLORIDE 0.9 % (FLUSH) 0.9 %
10 SYRINGE (ML) INJECTION PRN
Status: DISCONTINUED | OUTPATIENT
Start: 2017-07-17 | End: 2017-07-17 | Stop reason: HOSPADM

## 2017-07-17 RX ORDER — HYDROCODONE BITARTRATE AND ACETAMINOPHEN 5; 325 MG/1; MG/1
1 TABLET ORAL EVERY 6 HOURS PRN
Qty: 10 TABLET | Refills: 0 | Status: SHIPPED | OUTPATIENT
Start: 2017-07-17 | End: 2017-07-24

## 2017-07-17 RX ORDER — HYDRALAZINE HYDROCHLORIDE 20 MG/ML
5 INJECTION INTRAMUSCULAR; INTRAVENOUS EVERY 10 MIN PRN
Status: DISCONTINUED | OUTPATIENT
Start: 2017-07-17 | End: 2017-07-17 | Stop reason: HOSPADM

## 2017-07-17 RX ORDER — SCOLOPAMINE TRANSDERMAL SYSTEM 1 MG/1
1 PATCH, EXTENDED RELEASE TRANSDERMAL
Status: DISCONTINUED | OUTPATIENT
Start: 2017-07-17 | End: 2017-07-17 | Stop reason: HOSPADM

## 2017-07-17 RX ORDER — SODIUM CHLORIDE, SODIUM LACTATE, POTASSIUM CHLORIDE, CALCIUM CHLORIDE 600; 310; 30; 20 MG/100ML; MG/100ML; MG/100ML; MG/100ML
INJECTION, SOLUTION INTRAVENOUS CONTINUOUS
Status: DISCONTINUED | OUTPATIENT
Start: 2017-07-17 | End: 2017-07-17 | Stop reason: HOSPADM

## 2017-07-17 RX ORDER — HEPARIN SODIUM,PORCINE 10 UNIT/ML
VIAL (ML) INTRAVENOUS PRN
Status: DISCONTINUED | OUTPATIENT
Start: 2017-07-17 | End: 2017-07-17 | Stop reason: HOSPADM

## 2017-07-17 RX ORDER — MORPHINE SULFATE 4 MG/ML
4 INJECTION, SOLUTION INTRAMUSCULAR; INTRAVENOUS EVERY 5 MIN PRN
Status: DISCONTINUED | OUTPATIENT
Start: 2017-07-17 | End: 2017-07-17 | Stop reason: HOSPADM

## 2017-07-17 RX ORDER — FENTANYL CITRATE 50 UG/ML
INJECTION, SOLUTION INTRAMUSCULAR; INTRAVENOUS PRN
Status: DISCONTINUED | OUTPATIENT
Start: 2017-07-17 | End: 2017-07-17 | Stop reason: SDUPTHER

## 2017-07-17 RX ORDER — MORPHINE SULFATE 4 MG/ML
2 INJECTION, SOLUTION INTRAMUSCULAR; INTRAVENOUS EVERY 5 MIN PRN
Status: DISCONTINUED | OUTPATIENT
Start: 2017-07-17 | End: 2017-07-17 | Stop reason: HOSPADM

## 2017-07-17 RX ORDER — MIDAZOLAM HYDROCHLORIDE 1 MG/ML
2 INJECTION INTRAMUSCULAR; INTRAVENOUS
Status: COMPLETED | OUTPATIENT
Start: 2017-07-17 | End: 2017-07-17

## 2017-07-17 RX ORDER — ONDANSETRON 2 MG/ML
INJECTION INTRAMUSCULAR; INTRAVENOUS PRN
Status: DISCONTINUED | OUTPATIENT
Start: 2017-07-17 | End: 2017-07-17 | Stop reason: SDUPTHER

## 2017-07-17 RX ORDER — LIDOCAINE HYDROCHLORIDE 10 MG/ML
INJECTION, SOLUTION EPIDURAL; INFILTRATION; INTRACAUDAL; PERINEURAL PRN
Status: DISCONTINUED | OUTPATIENT
Start: 2017-07-17 | End: 2017-07-17 | Stop reason: HOSPADM

## 2017-07-17 RX ORDER — DIPHENHYDRAMINE HYDROCHLORIDE 50 MG/ML
12.5 INJECTION INTRAMUSCULAR; INTRAVENOUS
Status: DISCONTINUED | OUTPATIENT
Start: 2017-07-17 | End: 2017-07-17 | Stop reason: HOSPADM

## 2017-07-17 RX ORDER — FENTANYL CITRATE 50 UG/ML
50 INJECTION, SOLUTION INTRAMUSCULAR; INTRAVENOUS
Status: DISCONTINUED | OUTPATIENT
Start: 2017-07-17 | End: 2017-07-17 | Stop reason: HOSPADM

## 2017-07-17 RX ORDER — LIDOCAINE HYDROCHLORIDE 10 MG/ML
INJECTION, SOLUTION EPIDURAL; INFILTRATION; INTRACAUDAL; PERINEURAL PRN
Status: DISCONTINUED | OUTPATIENT
Start: 2017-07-17 | End: 2017-07-17 | Stop reason: SDUPTHER

## 2017-07-17 RX ORDER — HYDROCODONE BITARTRATE AND ACETAMINOPHEN 5; 325 MG/1; MG/1
1 TABLET ORAL EVERY 6 HOURS PRN
Status: DISCONTINUED | OUTPATIENT
Start: 2017-07-17 | End: 2017-07-17 | Stop reason: HOSPADM

## 2017-07-17 RX ORDER — SODIUM CHLORIDE 0.9 % (FLUSH) 0.9 %
10 SYRINGE (ML) INJECTION EVERY 12 HOURS SCHEDULED
Status: DISCONTINUED | OUTPATIENT
Start: 2017-07-17 | End: 2017-07-17 | Stop reason: HOSPADM

## 2017-07-17 RX ORDER — METOCLOPRAMIDE HYDROCHLORIDE 5 MG/ML
10 INJECTION INTRAMUSCULAR; INTRAVENOUS
Status: DISCONTINUED | OUTPATIENT
Start: 2017-07-17 | End: 2017-07-17 | Stop reason: HOSPADM

## 2017-07-17 RX ORDER — MORPHINE SULFATE 4 MG/ML
4 INJECTION, SOLUTION INTRAMUSCULAR; INTRAVENOUS
Status: DISCONTINUED | OUTPATIENT
Start: 2017-07-17 | End: 2017-07-17 | Stop reason: HOSPADM

## 2017-07-17 RX ORDER — MEPERIDINE HYDROCHLORIDE 50 MG/ML
12.5 INJECTION INTRAMUSCULAR; INTRAVENOUS; SUBCUTANEOUS EVERY 5 MIN PRN
Status: DISCONTINUED | OUTPATIENT
Start: 2017-07-17 | End: 2017-07-17 | Stop reason: HOSPADM

## 2017-07-17 RX ORDER — EPHEDRINE SULFATE 50 MG/ML
INJECTION, SOLUTION INTRAVENOUS PRN
Status: DISCONTINUED | OUTPATIENT
Start: 2017-07-17 | End: 2017-07-17 | Stop reason: SDUPTHER

## 2017-07-17 RX ORDER — PROMETHAZINE HYDROCHLORIDE 25 MG/ML
6.25 INJECTION, SOLUTION INTRAMUSCULAR; INTRAVENOUS
Status: DISCONTINUED | OUTPATIENT
Start: 2017-07-17 | End: 2017-07-17 | Stop reason: HOSPADM

## 2017-07-17 RX ADMIN — CEFAZOLIN 1000 MG: 1 INJECTION, POWDER, FOR SOLUTION INTRAVENOUS at 08:21

## 2017-07-17 RX ADMIN — LIDOCAINE HYDROCHLORIDE 5 ML: 10 INJECTION, SOLUTION EPIDURAL; INFILTRATION; INTRACAUDAL; PERINEURAL at 08:05

## 2017-07-17 RX ADMIN — ROCURONIUM BROMIDE 40 MG: 10 INJECTION INTRAVENOUS at 08:05

## 2017-07-17 RX ADMIN — SODIUM CHLORIDE: 4.5 INJECTION, SOLUTION INTRAVENOUS at 06:56

## 2017-07-17 RX ADMIN — MIDAZOLAM HYDROCHLORIDE 2 MG: 1 INJECTION, SOLUTION INTRAMUSCULAR; INTRAVENOUS at 07:59

## 2017-07-17 RX ADMIN — GLYCOPYRROLATE 0.4 MG: 0.2 INJECTION, SOLUTION INTRAMUSCULAR; INTRAVENOUS at 08:36

## 2017-07-17 RX ADMIN — ONDANSETRON HYDROCHLORIDE 4 MG: 2 INJECTION, SOLUTION INTRAVENOUS at 08:20

## 2017-07-17 RX ADMIN — EPHEDRINE SULFATE 15 MG: 50 INJECTION, SOLUTION INTRAMUSCULAR; INTRAVENOUS; SUBCUTANEOUS at 08:20

## 2017-07-17 RX ADMIN — NEOSTIGMINE METHYLSULFATE 3 MG: 1 INJECTION, SOLUTION INTRAMUSCULAR; INTRAVENOUS; SUBCUTANEOUS at 08:36

## 2017-07-17 RX ADMIN — SODIUM CHLORIDE, SODIUM LACTATE, POTASSIUM CHLORIDE, AND CALCIUM CHLORIDE: 600; 310; 30; 20 INJECTION, SOLUTION INTRAVENOUS at 07:59

## 2017-07-17 RX ADMIN — PROPOFOL 90 MG: 10 INJECTION, EMULSION INTRAVENOUS at 08:05

## 2017-07-17 RX ADMIN — FENTANYL CITRATE 50 MCG: 50 INJECTION INTRAMUSCULAR; INTRAVENOUS at 08:05

## 2017-07-17 RX ADMIN — LIDOCAINE HYDROCHLORIDE 1 ML: 10 INJECTION, SOLUTION EPIDURAL; INFILTRATION; INTRACAUDAL; PERINEURAL at 06:55

## 2017-07-17 ASSESSMENT — PAIN DESCRIPTION - DESCRIPTORS
DESCRIPTORS: BURNING
DESCRIPTORS: BURNING

## 2017-07-17 ASSESSMENT — PAIN DESCRIPTION - PAIN TYPE
TYPE: SURGICAL PAIN
TYPE: SURGICAL PAIN

## 2017-07-17 ASSESSMENT — PAIN SCALES - GENERAL
PAINLEVEL_OUTOF10: 2
PAINLEVEL_OUTOF10: 0
PAINLEVEL_OUTOF10: 2
PAINLEVEL_OUTOF10: 0

## 2017-07-17 ASSESSMENT — PAIN DESCRIPTION - LOCATION
LOCATION: ABDOMEN
LOCATION: ABDOMEN

## 2017-07-17 ASSESSMENT — PAIN DESCRIPTION - FREQUENCY
FREQUENCY: INTERMITTENT
FREQUENCY: INTERMITTENT

## 2017-07-17 ASSESSMENT — PAIN - FUNCTIONAL ASSESSMENT: PAIN_FUNCTIONAL_ASSESSMENT: 0-10

## 2017-08-14 ENCOUNTER — TELEPHONE (OUTPATIENT)
Dept: CARDIOLOGY | Facility: CLINIC | Age: 67
End: 2017-08-14

## 2017-08-14 NOTE — TELEPHONE ENCOUNTER
CALLED AND SPOKE WITH PT, INFORMED THE PT TO KEEP DOING WHAT HE'S DOING AND MAKE SURE TO TAKE THE ASA.  HE STATED HE VOICED UNDERSTANDING .

## 2017-08-14 NOTE — TELEPHONE ENCOUNTER
We were already aware of low platelets and that is why Plavix was stopped in June. Platelets were 90,000 in May and up to 108,000 8/7/17. Recommend he stay on aspirin due to history of CAD.

## 2017-08-14 NOTE — TELEPHONE ENCOUNTER
Patient's wife called and stated dialysis nurse wanted patient's PCP and cardiologist to know platelet count is chronically low. Dr Horton' office said for patient to call us. According to Liudmila, dialysis nurse, she stated we stopped his plavix in June due to low platelet count but according to her it has been low for the past two years. Would you like to do or change anything?    Thanks

## 2017-11-19 ENCOUNTER — APPOINTMENT (OUTPATIENT)
Dept: CT IMAGING | Age: 67
End: 2017-11-19
Payer: COMMERCIAL

## 2017-11-19 ENCOUNTER — APPOINTMENT (OUTPATIENT)
Dept: GENERAL RADIOLOGY | Age: 67
End: 2017-11-19
Payer: COMMERCIAL

## 2017-11-19 ENCOUNTER — HOSPITAL ENCOUNTER (EMERGENCY)
Age: 67
Discharge: HOME | End: 2017-11-19
Attending: EMERGENCY MEDICINE
Payer: COMMERCIAL

## 2017-11-19 VITALS
OXYGEN SATURATION: 91 % | RESPIRATION RATE: 18 BRPM | HEART RATE: 70 BPM | BODY MASS INDEX: 30.87 KG/M2 | SYSTOLIC BLOOD PRESSURE: 130 MMHG | DIASTOLIC BLOOD PRESSURE: 96 MMHG | TEMPERATURE: 98.6 F | WEIGHT: 191.25 LBS

## 2017-11-19 DIAGNOSIS — Z99.2 PATIENT ON PERITONEAL DIALYSIS (HCC): ICD-10-CM

## 2017-11-19 DIAGNOSIS — R10.84 GENERALIZED ABDOMINAL PAIN: Primary | ICD-10-CM

## 2017-11-19 LAB
ALBUMIN SERPL-MCNC: 4 G/DL (ref 3.5–5.2)
ALP BLD-CCNC: 107 U/L (ref 40–130)
ALT SERPL-CCNC: 13 U/L (ref 5–41)
ANION GAP SERPL CALCULATED.3IONS-SCNC: 11 MMOL/L (ref 7–19)
APPEARANCE FLUID: CLEAR
AST SERPL-CCNC: 12 U/L (ref 5–40)
BASE EXCESS ARTERIAL: 6.9 MMOL/L (ref -2–2)
BASOPHILS ABSOLUTE: 0 K/UL (ref 0–0.2)
BASOPHILS RELATIVE PERCENT: 0.5 % (ref 0–1)
BILIRUB SERPL-MCNC: 0.6 MG/DL (ref 0.2–1.2)
BUN BLDV-MCNC: 36 MG/DL (ref 8–23)
CALCIUM SERPL-MCNC: 9.7 MG/DL (ref 8.8–10.2)
CARBOXYHEMOGLOBIN ARTERIAL: 1.2 % (ref 0–5)
CELL COUNT FLUID TYPE: NORMAL
CHLORIDE BLD-SCNC: 100 MMOL/L (ref 98–111)
CLOT EVALUATION: NORMAL
CO2: 32 MMOL/L (ref 22–29)
COLOR FLUID: NORMAL
CREAT SERPL-MCNC: 4.9 MG/DL (ref 0.5–1.2)
EOSINOPHIL FLUID: 2 %
EOSINOPHILS ABSOLUTE: 0.2 K/UL (ref 0–0.6)
EOSINOPHILS RELATIVE PERCENT: 2.1 % (ref 0–5)
FLUID PATH CONSULT: NO
FLUID TYPE: NORMAL
GFR NON-AFRICAN AMERICAN: 12
GLUCOSE BLD-MCNC: 160 MG/DL (ref 74–109)
HCO3 ARTERIAL: 32 MMOL/L (ref 22–26)
HCT VFR BLD CALC: 44.2 % (ref 42–52)
HEMOGLOBIN, ART, EXTENDED: 14.4 G/DL (ref 14–18)
HEMOGLOBIN: 15.1 G/DL (ref 14–18)
LACTIC ACID: 0.8 MMOL/L (ref 0.5–1.9)
LYMPHOCYTES ABSOLUTE: 1.5 K/UL (ref 1.1–4.5)
LYMPHOCYTES RELATIVE PERCENT: 19.4 % (ref 20–40)
LYMPHOCYTES, BODY FLUID: 65 %
MACROPHAGE FLUID: 2 %
MCH RBC QN AUTO: 34.4 PG (ref 27–31)
MCHC RBC AUTO-ENTMCNC: 34.2 G/DL (ref 33–37)
MCV RBC AUTO: 100.7 FL (ref 80–94)
MESOTHELIAL FLUID: 2 %
METHEMOGLOBIN ARTERIAL: 0.5 %
MONOCYTE, FLUID: 24 %
MONOCYTES ABSOLUTE: 0.9 K/UL (ref 0–0.9)
MONOCYTES RELATIVE PERCENT: 10.9 % (ref 0–10)
NEUTROPHIL, FLUID: 5 %
NEUTROPHILS ABSOLUTE: 5.2 K/UL (ref 1.5–7.5)
NEUTROPHILS RELATIVE PERCENT: 66.8 % (ref 50–65)
NUCLEATED CELLS FLUID: 33 /CUMM
NUMBER OF CELLS COUNTED FLUID: 100
O2 CONTENT ARTERIAL: 17.7 ML/DL
O2 SAT, ARTERIAL: 87.7 %
O2 THERAPY: ABNORMAL
PCO2 ARTERIAL: 46 MMHG (ref 35–45)
PDW BLD-RTO: 12.9 % (ref 11.5–14.5)
PH ARTERIAL: 7.45 (ref 7.35–7.45)
PH, BODY FLUID: 7.82
PLATELET # BLD: 127 K/UL (ref 130–400)
PMV BLD AUTO: 11.8 FL (ref 9.4–12.4)
PO2 ARTERIAL: 52 MMHG (ref 80–100)
POTASSIUM SERPL-SCNC: 3 MMOL/L (ref 3.5–5)
POTASSIUM, WHOLE BLOOD: 2.8
RBC # BLD: 4.39 M/UL (ref 4.7–6.1)
RBC FLUID: 263 /CUMM
SODIUM BLD-SCNC: 143 MMOL/L (ref 136–145)
TOTAL PROTEIN: 6.6 G/DL (ref 6.6–8.7)
WBC # BLD: 7.8 K/UL (ref 4.8–10.8)

## 2017-11-19 PROCEDURE — 99283 EMERGENCY DEPT VISIT LOW MDM: CPT | Performed by: EMERGENCY MEDICINE

## 2017-11-19 PROCEDURE — 87075 CULTR BACTERIA EXCEPT BLOOD: CPT

## 2017-11-19 PROCEDURE — 80053 COMPREHEN METABOLIC PANEL: CPT

## 2017-11-19 PROCEDURE — 83986 ASSAY PH BODY FLUID NOS: CPT

## 2017-11-19 PROCEDURE — 71010 XR CHEST PORTABLE: CPT

## 2017-11-19 PROCEDURE — 74176 CT ABD & PELVIS W/O CONTRAST: CPT

## 2017-11-19 PROCEDURE — 99284 EMERGENCY DEPT VISIT MOD MDM: CPT

## 2017-11-19 PROCEDURE — 82803 BLOOD GASES ANY COMBINATION: CPT

## 2017-11-19 PROCEDURE — 85025 COMPLETE CBC W/AUTO DIFF WBC: CPT

## 2017-11-19 PROCEDURE — 93005 ELECTROCARDIOGRAM TRACING: CPT

## 2017-11-19 PROCEDURE — 87205 SMEAR GRAM STAIN: CPT

## 2017-11-19 PROCEDURE — 36600 WITHDRAWAL OF ARTERIAL BLOOD: CPT

## 2017-11-19 PROCEDURE — 87015 SPECIMEN INFECT AGNT CONCNTJ: CPT

## 2017-11-19 PROCEDURE — 36415 COLL VENOUS BLD VENIPUNCTURE: CPT

## 2017-11-19 PROCEDURE — 6370000000 HC RX 637 (ALT 250 FOR IP): Performed by: EMERGENCY MEDICINE

## 2017-11-19 PROCEDURE — 84132 ASSAY OF SERUM POTASSIUM: CPT

## 2017-11-19 PROCEDURE — 87070 CULTURE OTHR SPECIMN AEROBIC: CPT

## 2017-11-19 PROCEDURE — 89051 BODY FLUID CELL COUNT: CPT

## 2017-11-19 PROCEDURE — 83605 ASSAY OF LACTIC ACID: CPT

## 2017-11-19 RX ORDER — ONDANSETRON 4 MG/1
4 TABLET, ORALLY DISINTEGRATING ORAL EVERY 8 HOURS PRN
Qty: 10 TABLET | Refills: 0 | Status: SHIPPED | OUTPATIENT
Start: 2017-11-19 | End: 2018-08-28

## 2017-11-19 RX ORDER — POTASSIUM CHLORIDE 20 MEQ/1
40 TABLET, EXTENDED RELEASE ORAL ONCE
Status: COMPLETED | OUTPATIENT
Start: 2017-11-19 | End: 2017-11-19

## 2017-11-19 RX ORDER — HYDROCODONE BITARTRATE AND ACETAMINOPHEN 7.5; 325 MG/1; MG/1
1 TABLET ORAL EVERY 6 HOURS PRN
Qty: 15 TABLET | Refills: 0 | Status: ON HOLD | OUTPATIENT
Start: 2017-11-19 | End: 2018-11-13

## 2017-11-19 RX ADMIN — POTASSIUM CHLORIDE 40 MEQ: 20 TABLET, EXTENDED RELEASE ORAL at 14:50

## 2017-11-19 ASSESSMENT — ENCOUNTER SYMPTOMS
ABDOMINAL PAIN: 1
SHORTNESS OF BREATH: 0
DIARRHEA: 0
VOMITING: 1
COUGH: 0

## 2017-11-19 NOTE — PROGRESS NOTES
Blood Gas, Arterial [[id number]] (Abnormal) Collected: 11/19/17 1139      Specimen: Blood from Blood gases Updated: 11/19/17 1148      pH, Arterial 7.450      pCO2, Arterial 46.0 (H) mmHg       pO2, Arterial 52.0 (L) mmHg       HCO3, Arterial 32.0 (H) mmol/L       Base Excess, Arterial 6.9 (H) mmol/L       Hemoglobin, Art, Extended 14.4 g/dL       O2 Sat, Arterial 87.7 (LL) %       Carboxyhgb, Arterial 1.2 %       Methemoglobin, Arterial 0.5 %       O2 Content, Arterial 17.7 mL/dL       O2 Therapy Unknown     Narrative:       CALL  Vaughn Rios RN ER, 11/19/2017 11:48, by Rachel Walker     Potassium, Whole Blood [[id number]] Collected: 11/19/17 1139      Updated: 11/19/17 1146      Potassium, Whole Blood 2.8     Pt on room air, RR 16 site RR at+

## 2017-11-19 NOTE — ED PROVIDER NOTES
[facility] EMERGENCY DEPT  eMERGENCY dEPARTMENT eNCOUnter      Pt Name: Waldo Douglas  MRN: [id number]  [place] 1950  Date of evaluation: 11/19/2017  Provider: Jenny Whitfield MD    CHIEF COMPLAINT     No chief complaint on file. HISTORY OF PRESENT ILLNESS   (Location/Symptom, Timing/Onset, Context/Setting, Quality, Duration, Modifying Factors, Severity)  Note limiting factors. Waldo Douglas is a 74 y.o. male who presents to the emergency department      The history is provided by the patient. Abdominal Pain   Pain location:  Generalized  Pain quality: aching    Pain radiates to:  Does not radiate  Pain severity:  Mild  Onset quality:  Sudden  Duration:  3 days  Timing:  Constant  Progression:  Worsening  Chronicity:  New  Context comment:  PD patient  Relieved by:  None tried  Worsened by:  Palpation  Ineffective treatments:  None tried  Associated symptoms: vomiting (x 1 yesterday)    Associated symptoms: no chills, no cough, no diarrhea, no fever and no shortness of breath        Nursing Notes were reviewed. REVIEW OF SYSTEMS    (2-9 systems for level 4, 10 or more for level 5)     Review of Systems   Constitutional: Negative for chills and fever. Respiratory: Negative for cough and shortness of breath. Gastrointestinal: Positive for abdominal pain and vomiting (x 1 yesterday). Negative for diarrhea. All other systems reviewed and are negative. Except as noted above the remainder of the review of systems was reviewed and negative. PAST MEDICAL HISTORY     Past Medical History:   Diagnosis Date    CAD (coronary artery disease) 1/31/2015    sees [facility].     CHF (congestive heart failure) (HCC)     Chronic kidney disease (CKD), stage V ([facility] [address] 5/26/2017    Cigarette smoker 1/31/2015    COPD (chronic obstructive pulmonary disease) ([facility] [address]     Diabetes mellitus ([facility] [address] 1/31/2015    Hypercholesteremia 1/31/2015    Hypertension 1/31/2015    Pacemaker 1/31/2015    300 mg by mouth daily Indications: Gastroesophageal Reflux Disease Historical Med      SITagliptin (JANUVIA) 25 MG tablet Take 25 mg by mouth daily Indications: Diabetes Historical Med      aspirin 81 MG tablet Take 81 mg by mouth dailyHistorical Med             ALLERGIES     Review of patient's allergies indicates no known allergies. FAMILY HISTORY       Family History   Problem Relation Age of Onset    Heart Disease Mother     Heart Disease Father           SOCIAL HISTORY       Social History     Social History    Marital status:      Spouse name: N/A    Number of children: N/A    Years of education: N/A     Social History Main Topics    Smoking status: Current Every Day Smoker     Packs/day: 0.50    Smokeless tobacco: Never Used    Alcohol use No    Drug use: No    Sexual activity: Not on file     Other Topics Concern    Not on file     Social History Narrative    No narrative on file       SCREENINGS             PHYSICAL EXAM    (up to 7 for level 4, 8 or more for level 5)     ED Triage Vitals [11/19/17 1107]   BP Temp Temp Source Pulse Resp SpO2 Height Weight   (!) 132/100 98.6 °F (37 °C) Oral 66 18 (!) 88 % -- 191 lb 4 oz (86.8 kg)       Physical Exam   Constitutional: He is oriented to person, place, and time. He appears well-developed and well-nourished. No distress. HENT:   Mouth/Throat: Oropharynx is clear and moist.   Neck: Normal range of motion. Neck supple. Cardiovascular: Normal rate, regular rhythm and normal heart sounds. Pulmonary/Chest: Effort normal and breath sounds normal.   Abdominal: Soft. There is tenderness (mild diffuse). There is no rebound and no guarding. Musculoskeletal: He exhibits no edema. Neurological: He is alert and oriented to person, place, and time. Skin: Skin is warm and dry. He is not diaphoretic. Psychiatric: He has a normal mood and affect. Nursing note and vitals reviewed.       DIAGNOSTIC RESULTS     EKG: All EKG's are interpreted by the Emergency Department Physician who either signs or Co-signs this chart in the absence of a cardiologist.        RADIOLOGY:   Non-plain film images such as CT, Ultrasound and MRI are read by the radiologist. Plain radiographic images are visualized and preliminarily interpreted by the emergency physician with the below findings:        Interpretation per the Radiologist below, if available at the time of this note:    XR Chest Portable   Final Result   1. Right atrial pacing lead is not positioned. This is a change from   January 31, 2015   There is no pulmonary vascular congestion. Signed by Dr Diamante Jones on 11/19/2017 11:48 AM      CT ABDOMEN PELVIS WO IV CONTRAST Additional Contrast? None   Final Result   1. Peritoneal dialysis catheter is present. Fluid free fluid is noted   throughout the abdomen. 2. 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   3. Vascular calcifications noted in the abdominal aorta. A stent is   present in the right iliac artery. Signed by Dr Diamante Jones on 11/19/2017 11:35 AM            ED BEDSIDE ULTRASOUND:   Performed by ED Physician - none    LABS:  Labs Reviewed   CBC WITH AUTO DIFFERENTIAL - Abnormal; Notable for the following:        Result Value    RBC 4.39 (*)     .7 (*)     MCH 34.4 (*)     Platelets 295 (*)     Neutrophils % 66.8 (*)     Lymphocytes % 19.4 (*)     Monocytes % 10.9 (*)     All other components within normal limits   COMPREHENSIVE METABOLIC PANEL - Abnormal; Notable for the following:     Potassium 3.0 (*)     CO2 32 (*)     Glucose 160 (*)     BUN 36 (*)     CREATININE 4.9 (*)     GFR Non- 12 (*)     All other components within normal limits   BLOOD GAS, ARTERIAL - Abnormal; Notable for the following:     pCO2, Arterial 46.0 (*)     pO2, Arterial 52.0 (*)     HCO3, Arterial 32.0 (*)     Base Excess, Arterial 6.9 (*)     O2 Sat, Arterial 87.7 (*)     All other components within normal limits    Narrative:     CALL  Mendes  K[facility] tel.

## 2017-11-21 LAB
EKG P AXIS: NORMAL DEGREES
EKG P-R INTERVAL: NORMAL MS
EKG Q-T INTERVAL: 472 MS
EKG QRS DURATION: 168 MS
EKG QTC CALCULATION (BAZETT): 479 MS
EKG T AXIS: 101 DEGREES

## 2017-11-23 LAB
ANAEROBIC CULTURE: NORMAL
BODY FLUID CULTURE, STERILE: NORMAL
GRAM STAIN RESULT: NORMAL

## 2017-11-29 ENCOUNTER — OFFICE VISIT (OUTPATIENT)
Dept: SURGERY | Age: 67
End: 2017-11-29
Payer: COMMERCIAL

## 2017-11-29 VITALS
SYSTOLIC BLOOD PRESSURE: 120 MMHG | DIASTOLIC BLOOD PRESSURE: 68 MMHG | TEMPERATURE: 97.4 F | HEIGHT: 66 IN | BODY MASS INDEX: 30.31 KG/M2 | WEIGHT: 188.6 LBS

## 2017-11-29 DIAGNOSIS — K80.10 CALCULUS OF GALLBLADDER WITH CHRONIC CHOLECYSTITIS WITHOUT OBSTRUCTION: Primary | ICD-10-CM

## 2017-11-29 PROCEDURE — 99212 OFFICE O/P EST SF 10 MIN: CPT | Performed by: PHYSICIAN ASSISTANT

## 2017-11-29 PROCEDURE — 4004F PT TOBACCO SCREEN RCVD TLK: CPT | Performed by: PHYSICIAN ASSISTANT

## 2017-11-29 PROCEDURE — G8417 CALC BMI ABV UP PARAM F/U: HCPCS | Performed by: PHYSICIAN ASSISTANT

## 2017-11-29 PROCEDURE — G8427 DOCREV CUR MEDS BY ELIG CLIN: HCPCS | Performed by: PHYSICIAN ASSISTANT

## 2017-11-29 PROCEDURE — 4040F PNEUMOC VAC/ADMIN/RCVD: CPT | Performed by: PHYSICIAN ASSISTANT

## 2017-11-29 PROCEDURE — G8598 ASA/ANTIPLAT THER USED: HCPCS | Performed by: PHYSICIAN ASSISTANT

## 2017-11-29 PROCEDURE — 3017F COLORECTAL CA SCREEN DOC REV: CPT | Performed by: PHYSICIAN ASSISTANT

## 2017-11-29 PROCEDURE — G8484 FLU IMMUNIZE NO ADMIN: HCPCS | Performed by: PHYSICIAN ASSISTANT

## 2017-11-29 PROCEDURE — 1123F ACP DISCUSS/DSCN MKR DOCD: CPT | Performed by: PHYSICIAN ASSISTANT

## 2017-11-29 RX ORDER — HYOSCYAMINE SULFATE 0.125 MG
TABLET,DISINTEGRATING ORAL
Qty: 20 TABLET | Refills: 0 | Status: SHIPPED | OUTPATIENT
Start: 2017-11-29 | End: 2018-08-28

## 2017-12-05 ENCOUNTER — TELEPHONE (OUTPATIENT)
Dept: SURGERY | Age: 67
End: 2017-12-05

## 2017-12-05 NOTE — TELEPHONE ENCOUNTER
Plan OR for Friday    This has been electronically signed by Miguelina Smith.  Gabriella Hernández PA-C.

## 2017-12-05 NOTE — TELEPHONE ENCOUNTER
Pt's wife calling to schedule gallbladder surgery - Nulev is not helping     Cc:  Ashley Alatorre PA-C

## 2017-12-06 ENCOUNTER — HOSPITAL ENCOUNTER (EMERGENCY)
Age: 67
Discharge: HOME | End: 2017-12-06
Attending: FAMILY MEDICINE
Payer: COMMERCIAL

## 2017-12-06 VITALS
RESPIRATION RATE: 15 BRPM | DIASTOLIC BLOOD PRESSURE: 91 MMHG | SYSTOLIC BLOOD PRESSURE: 120 MMHG | BODY MASS INDEX: 28.93 KG/M2 | OXYGEN SATURATION: 98 % | HEART RATE: 60 BPM | TEMPERATURE: 97.8 F | WEIGHT: 180 LBS | HEIGHT: 66 IN

## 2017-12-06 DIAGNOSIS — E86.0 DEHYDRATION, MILD: Primary | ICD-10-CM

## 2017-12-06 LAB
ALBUMIN SERPL-MCNC: 3.4 G/DL (ref 3.5–5.2)
ALP BLD-CCNC: 109 U/L (ref 40–130)
ALT SERPL-CCNC: 15 U/L (ref 5–41)
ANION GAP SERPL CALCULATED.3IONS-SCNC: 7 MMOL/L (ref 7–19)
AST SERPL-CCNC: 11 U/L (ref 5–40)
BASOPHILS ABSOLUTE: 0 K/UL (ref 0–0.2)
BASOPHILS RELATIVE PERCENT: 0.7 % (ref 0–1)
BILIRUB SERPL-MCNC: 0.6 MG/DL (ref 0.2–1.2)
BUN BLDV-MCNC: 24 MG/DL (ref 8–23)
CALCIUM SERPL-MCNC: 9.5 MG/DL (ref 8.8–10.2)
CHLORIDE BLD-SCNC: 107 MMOL/L (ref 98–111)
CO2: 32 MMOL/L (ref 22–29)
CREAT SERPL-MCNC: 4.8 MG/DL (ref 0.5–1.2)
EOSINOPHILS ABSOLUTE: 0.1 K/UL (ref 0–0.6)
EOSINOPHILS RELATIVE PERCENT: 1.8 % (ref 0–5)
GFR NON-AFRICAN AMERICAN: 12
GLUCOSE BLD-MCNC: 131 MG/DL (ref 74–109)
HCT VFR BLD CALC: 45 % (ref 42–52)
HEMOGLOBIN: 15.3 G/DL (ref 14–18)
LYMPHOCYTES ABSOLUTE: 1.3 K/UL (ref 1.1–4.5)
LYMPHOCYTES RELATIVE PERCENT: 21.6 % (ref 20–40)
MCH RBC QN AUTO: 33.6 PG (ref 27–31)
MCHC RBC AUTO-ENTMCNC: 34 G/DL (ref 33–37)
MCV RBC AUTO: 98.9 FL (ref 80–94)
MONOCYTES ABSOLUTE: 0.4 K/UL (ref 0–0.9)
MONOCYTES RELATIVE PERCENT: 7.2 % (ref 0–10)
NEUTROPHILS ABSOLUTE: 4.2 K/UL (ref 1.5–7.5)
NEUTROPHILS RELATIVE PERCENT: 68.4 % (ref 50–65)
PDW BLD-RTO: 12.4 % (ref 11.5–14.5)
PLATELET # BLD: 102 K/UL (ref 130–400)
PMV BLD AUTO: 12 FL (ref 9.4–12.4)
POTASSIUM SERPL-SCNC: 3.5 MMOL/L (ref 3.5–5)
RBC # BLD: 4.55 M/UL (ref 4.7–6.1)
SODIUM BLD-SCNC: 146 MMOL/L (ref 136–145)
TOTAL PROTEIN: 5.7 G/DL (ref 6.6–8.7)
WBC # BLD: 6.1 K/UL (ref 4.8–10.8)

## 2017-12-06 PROCEDURE — 96360 HYDRATION IV INFUSION INIT: CPT

## 2017-12-06 PROCEDURE — 99283 EMERGENCY DEPT VISIT LOW MDM: CPT | Performed by: FAMILY MEDICINE

## 2017-12-06 PROCEDURE — 99284 EMERGENCY DEPT VISIT MOD MDM: CPT

## 2017-12-06 PROCEDURE — 85025 COMPLETE CBC W/AUTO DIFF WBC: CPT

## 2017-12-06 PROCEDURE — 2580000003 HC RX 258: Performed by: FAMILY MEDICINE

## 2017-12-06 PROCEDURE — 36415 COLL VENOUS BLD VENIPUNCTURE: CPT

## 2017-12-06 PROCEDURE — 80053 COMPREHEN METABOLIC PANEL: CPT

## 2017-12-06 RX ORDER — 0.9 % SODIUM CHLORIDE 0.9 %
500 INTRAVENOUS SOLUTION INTRAVENOUS ONCE
Status: COMPLETED | OUTPATIENT
Start: 2017-12-06 | End: 2017-12-06

## 2017-12-06 RX ORDER — SODIUM CHLORIDE 9 MG/ML
INJECTION, SOLUTION INTRAVENOUS CONTINUOUS
Status: DISCONTINUED | OUTPATIENT
Start: 2017-12-06 | End: 2017-12-06 | Stop reason: HOSPADM

## 2017-12-06 RX ADMIN — SODIUM CHLORIDE 500 ML: 9 INJECTION, SOLUTION INTRAVENOUS at 11:39

## 2017-12-06 ASSESSMENT — ENCOUNTER SYMPTOMS
CONSTIPATION: 0
SORE THROAT: 0
ABDOMINAL PAIN: 1
WHEEZING: 0
DIARRHEA: 0
BACK PAIN: 0
COUGH: 0
NAUSEA: 1
ABDOMINAL DISTENTION: 0
PHOTOPHOBIA: 0
SHORTNESS OF BREATH: 0

## 2017-12-06 ASSESSMENT — PAIN DESCRIPTION - LOCATION: LOCATION: ABDOMEN

## 2017-12-06 ASSESSMENT — PAIN SCALES - GENERAL: PAINLEVEL_OUTOF10: 6

## 2017-12-06 NOTE — PROGRESS NOTES
HISTORY OF PRESENT ILLNESS:  Viri Garza is a pleasant 82-year-old male well known to us due to previous peritoneal dialysis catheter placement. He now presents with anorexia and midepigastric abdominal pain. Diagnostic studies have revealed gallstones. There is no reported change in bowel habits or fevers chills or jaundice. I have discussed his peritoneal dialysis with the dialysis unit. They are going to try to forego placement of a permacath and hemodialysis. We discussed the pathophysiology of gallbladder disease and the risk benefits and alternatives of surgery. Brady Balderrama wishes to proceed. Patient Active Problem List    Diagnosis Date Noted    Chronic kidney disease (CKD), stage V ([facility] [address] 05/26/2017    CAD (coronary artery disease) 01/31/2015    Diabetes mellitus ([facility] [address] 01/31/2015    Hypertension 01/31/2015    Hypercholesteremia 01/31/2015    Cigarette smoker 01/31/2015    Pacemaker 01/31/2015     Current Outpatient Prescriptions   Medication Sig Dispense Refill    hyoscyamine (NULEV) 125 MCG TBDP dispersible tablet One po/sl qac/prn 20 tablet 0    HYDROcodone-acetaminophen (NORCO) 7.5-325 MG per tablet Take 1 tablet by mouth every 6 hours as needed for Pain .  15 tablet 0    ondansetron (ZOFRAN ODT) 4 MG disintegrating tablet Take 1 tablet by mouth every 8 hours as needed for Nausea or Vomiting 10 tablet 0    OXYGEN Inhale 2 L into the lungs nightly Indications: nightly      fluticasone-salmeterol (ADVAIR) 100-50 MCG/DOSE diskus inhaler Inhale 1 puff into the lungs every 12 hours      allopurinol (ZYLOPRIM) 100 MG tablet 100 mg daily Indications: Arthritis       amLODIPine (NORVASC) 10 MG tablet Take 10 mg by mouth daily Indications: High Blood Pressure       vitamin D (ERGOCALCIFEROL) 84989 UNITS CAPS capsule 1.25 Units Monthly      gabapentin (NEURONTIN) 300 MG capsule 300 mg 2 times daily Indications: Diabetes with Nerve Disease       carvedilol (COREG) 12.5 MG tablet Take 12.5 mg by History   Substance Use Topics    Smoking status: Current Every Day Smoker     Packs/day: 0.50    Smokeless tobacco: Never Used    Alcohol use No       REVIEW OF SYSTEMS:  14 point review of systems reviewed and positive for the above    PHYSICAL EXAMINATION:  Blood pressure 120/68, temperature 97.4 °F (36.3 °C), temperature source Temporal, height 5' 6\" (1.676 m), weight 188 lb 9.6 oz (85.5 kg). GENERAL:  Reveals a 74 y.o. male that  appears to be in no acute distress. HEENT:  Head is normocephalic and atraumatic. NECK:  Neck is supple without masses or carotid bruits. No obvious thyromegaly is grossly noted. CHEST:  Patient has normal respiratory effort. Chest is clear bilaterally with good thoracic expansion. HEART:  Heart demonstrated a regular rhythm and rate with no cardiac murmurs, gallops or rubs noted to auscultation. ABDOMEN:  Inspection of the abdomen demonstrated the patient to have normal bowel sounds present. The abdomen is soft and nontender. No costovertebral tenderness is noted to percussion bilaterally. Peritoneal dialysis catheter in place. EXTREMITIES:  Extremities demonstrated no cyanosis or pitting edema bilaterally. PSYCHIATRIC:  Patient is oriented to time, place and person. The patient's mood and affect are normal.      IMPRESSION:  Chronic Cholecystitis with Cholelithiasis  Renal failure currently on peritoneal dialysis    PLAN:  The risks, benefits, and options were discussed with the patient. He  is willing to proceed with surgery.

## 2017-12-06 NOTE — ED PROVIDER NOTES
[facility]  eMERGENCY dEPARTMENT eNCOUnter      Pt Name: John Love  MRN: [id number]  [place] 1950  Date of evaluation: 12/6/2017  Provider: Ben Everett MD    [address] [place] [address]       Chief Complaint   Patient presents with    Hypotension     sent here from outpt dialysis was there for check up, reporting low blood pressure, pt asymptomatic          HISTORY OF PRESENT ILLNESS   (Location/Symptom, Timing/Onset, Context/Setting, Quality, Duration, Modifying Factors, Severity)  Note limiting factors. John Love is a 74 y.o. male who presents to the emergency department Sent from outpatient dialysis for her low blood pressure patient denies any symptoms at this time, however he has been very weak and lightheaded upon standing today. Wife relates the patient for 2 weeks now has been not well and has been diagnosed with a malfunctioning gallbladder and is scheduled for surgery. She relates that his intake of food and drink have been diminished over the past 2 weeks and she suspects he is a little dehydrated. Chart review shows a normal blood pressure approximately 7 days ago. HPI    Nursing Notes were reviewed. REVIEW OF SYSTEMS    (2-9 systems for level 4, 10 or more for level 5)     Review of Systems   Constitutional: Positive for fatigue. Negative for activity change, appetite change, chills, diaphoresis and fever. HENT: Negative for congestion and sore throat. Eyes: Negative for photophobia and visual disturbance. Respiratory: Negative for cough, shortness of breath and wheezing. Cardiovascular: Negative for chest pain, palpitations and leg swelling. Gastrointestinal: Positive for abdominal pain and nausea. Negative for abdominal distention, constipation and diarrhea. Endocrine: Negative for cold intolerance and heat intolerance. Genitourinary: Negative for difficulty urinating and dysuria. Musculoskeletal: Negative for back pain. Skin: Negative for rash.    Neurological: Positive for weakness and light-headedness. Negative for dizziness, seizures and syncope. Hematological: Negative for adenopathy. Psychiatric/Behavioral: Negative for agitation, confusion and suicidal ideas. PAST MEDICAL HISTORY     Past Medical History:   Diagnosis Date    CAD (coronary artery disease) 1/31/2015    sees [facility] heart group.  CHF (congestive heart failure) (HCC)     Chronic kidney disease (CKD), stage V ([facility] [facility] 5/26/2017    Cigarette smoker 1/31/2015    COPD (chronic obstructive pulmonary disease) ([facility] [address] [facility]     Diabetes mellitus ([facility] [address] [facility] 1/31/2015    Hypercholesteremia 1/31/2015    Hypertension 1/31/2015    Pacemaker 1/31/2015    Peritoneal dialysis catheter dysfunction ([facility] [address] [facility]     Sleep apnea     No MACHINE         SURGICAL HISTORY       Past Surgical History:   Procedure Laterality Date    CARDIAC CATHETERIZATION  2/2/15  JDT    stent to mid RCA. EF 45%    CARPAL TUNNEL RELEASE Right     CORONARY ARTERY BYPASS GRAFT  2004    ELBOW SURGERY Right     HERNIA REPAIR  3546?    umbilical hernia repair    KNEE ARTHROSCOPY Left     LAPAROSCOPY INSERTION PERITONEAL CATHETER N/A 5/26/2017    LAPAROSCOPIC INSERTION PERITONEAL DIALYSIS CATHETER WITH OMENTOPEXY performed by Genesis Hensley MD at [address] [place] [address] N/A 7/17/2017    DIAGNOSTIC LAPAROSCOPY performed by Genesis Hensley MD at [address]      also pacemaker replacement         CURRENT MEDICATIONS       Current Discharge Medication List      CONTINUE these medications which have NOT CHANGED    Details   hyoscyamine (NULEV) 125 MCG TBDP dispersible tablet One po/sl qac/prn  Qty: 20 tablet, Refills: 0    Associated Diagnoses: Calculus of gallbladder with chronic cholecystitis without obstruction      HYDROcodone-acetaminophen (NORCO) 7.5-325 MG per tablet Take 1 tablet by mouth every 6 hours as needed for Pain .   Qty: 15 tablet, Refills: 0      ondansetron (ZOFRAN ODT) 4 MG Non- 12 (*)     Total Protein 5.7 (*)     Alb 3.4 (*)     All other components within normal limits       All other labs were within normal range or not returned as of this dictation. EMERGENCY DEPARTMENT COURSE and DIFFERENTIAL DIAGNOSIS/MDM:   Vitals:    Vitals:    12/06/17 0958 12/06/17 1202 12/06/17 1232   BP: 99/67 115/75 115/68   Pulse: 68 68 71   Resp: 17     Temp: 97.9 °F (36.6 °C)     SpO2: 94% 96% 96%   Weight: 180 lb (81.6 kg)     Height: 5' 6\" (1.676 m)         MDM  Number of Diagnoses or Management Options  Dehydration, mild: minor     Amount and/or Complexity of Data Reviewed  Clinical lab tests: ordered and reviewed  Decide to obtain previous medical records or to obtain history from someone other than the patient: yes  Review and summarize past medical records: yes    Risk of Complications, Morbidity, and/or Mortality  Presenting problems: moderate  Diagnostic procedures: moderate  Management options: moderate    Patient Progress  Patient progress: resolved      Reassessment  1:19 PM patient states he feels much better he is tolerating his p.o. intake well his blood pressure now is normal after fluid bolus. Patient's requesting to go home at this point I don't see any entry indication for the patient getting a surgery on the advised him to follow his presurgical instructions carefully. I have also advised him to maintain his hydration status. CONSULTS:  None    PROCEDURES:  Unless otherwise noted below, none     Procedures    FINAL IMPRESSION      1. Dehydration, mild          DISPOSITION/PLAN   DISPOSITION     PATIENT REFERRED TO:  No follow-up provider specified.     DISCHARGE MEDICATIONS:  Current Discharge Medication List             (Please note that portions of this note were completed with a voice recognition program.  Efforts were made to edit the dictations but occasionally words are mis-transcribed.)    Yvon Tucker MD (electronically signed)  Attending Emergency Physician          Glenis Vasques MD  12/06/17 [id number]

## 2017-12-08 ENCOUNTER — HOSPITAL ENCOUNTER (OUTPATIENT)
Age: 67
Setting detail: OUTPATIENT SURGERY
Discharge: HOME | End: 2017-12-08
Attending: SURGERY | Admitting: SURGERY
Payer: COMMERCIAL

## 2017-12-08 ENCOUNTER — ANESTHESIA (OUTPATIENT)
Dept: OPERATING ROOM | Age: 67
End: 2017-12-08
Payer: COMMERCIAL

## 2017-12-08 ENCOUNTER — APPOINTMENT (OUTPATIENT)
Dept: GENERAL RADIOLOGY | Age: 67
End: 2017-12-08
Attending: SURGERY
Payer: COMMERCIAL

## 2017-12-08 ENCOUNTER — ANESTHESIA EVENT (OUTPATIENT)
Dept: OPERATING ROOM | Age: 67
End: 2017-12-08
Payer: COMMERCIAL

## 2017-12-08 VITALS
TEMPERATURE: 98 F | DIASTOLIC BLOOD PRESSURE: 70 MMHG | HEIGHT: 66 IN | OXYGEN SATURATION: 98 % | HEART RATE: 75 BPM | BODY MASS INDEX: 28.93 KG/M2 | RESPIRATION RATE: 16 BRPM | SYSTOLIC BLOOD PRESSURE: 154 MMHG | WEIGHT: 180 LBS

## 2017-12-08 VITALS
DIASTOLIC BLOOD PRESSURE: 105 MMHG | SYSTOLIC BLOOD PRESSURE: 152 MMHG | TEMPERATURE: 97.8 F | OXYGEN SATURATION: 90 % | RESPIRATION RATE: 13 BRPM

## 2017-12-08 PROBLEM — K80.20 GALLSTONES: Status: ACTIVE | Noted: 2017-12-08

## 2017-12-08 LAB
GLUCOSE BLD-MCNC: 111 MG/DL (ref 70–99)
GLUCOSE BLD-MCNC: 121 MG/DL (ref 70–99)
PERFORMED ON: ABNORMAL
PERFORMED ON: ABNORMAL

## 2017-12-08 PROCEDURE — 88304 TISSUE EXAM BY PATHOLOGIST: CPT

## 2017-12-08 PROCEDURE — 6370000000 HC RX 637 (ALT 250 FOR IP): Performed by: SURGERY

## 2017-12-08 PROCEDURE — A4364 ADHESIVE, LIQUID OR EQUAL: HCPCS | Performed by: SURGERY

## 2017-12-08 PROCEDURE — 3700000000 HC ANESTHESIA ATTENDED CARE: Performed by: SURGERY

## 2017-12-08 PROCEDURE — 47563 LAPARO CHOLECYSTECTOMY/GRAPH: CPT | Performed by: SURGERY

## 2017-12-08 PROCEDURE — 2500000003 HC RX 250 WO HCPCS: Performed by: SURGERY

## 2017-12-08 PROCEDURE — 7100000011 HC PHASE II RECOVERY - ADDTL 15 MIN: Performed by: SURGERY

## 2017-12-08 PROCEDURE — C1729 CATH, DRAINAGE: HCPCS | Performed by: SURGERY

## 2017-12-08 PROCEDURE — 3600000004 HC SURGERY LEVEL 4 BASE: Performed by: SURGERY

## 2017-12-08 PROCEDURE — 7100000000 HC PACU RECOVERY - FIRST 15 MIN: Performed by: SURGERY

## 2017-12-08 PROCEDURE — 3600000014 HC SURGERY LEVEL 4 ADDTL 15MIN: Performed by: SURGERY

## 2017-12-08 PROCEDURE — 2500000003 HC RX 250 WO HCPCS: Performed by: NURSE ANESTHETIST, CERTIFIED REGISTERED

## 2017-12-08 PROCEDURE — 7100000010 HC PHASE II RECOVERY - FIRST 15 MIN: Performed by: SURGERY

## 2017-12-08 PROCEDURE — 74300 X-RAY BILE DUCTS/PANCREAS: CPT

## 2017-12-08 PROCEDURE — 2720000001 HC MISC SURG SUPPLY STERILE $51-500: Performed by: SURGERY

## 2017-12-08 PROCEDURE — 6360000002 HC RX W HCPCS: Performed by: NURSE ANESTHETIST, CERTIFIED REGISTERED

## 2017-12-08 PROCEDURE — 2580000003 HC RX 258: Performed by: SURGERY

## 2017-12-08 PROCEDURE — 2720000010 HC SURG SUPPLY STERILE: Performed by: SURGERY

## 2017-12-08 PROCEDURE — 6360000002 HC RX W HCPCS

## 2017-12-08 PROCEDURE — 7100000001 HC PACU RECOVERY - ADDTL 15 MIN: Performed by: SURGERY

## 2017-12-08 PROCEDURE — 6360000002 HC RX W HCPCS: Performed by: SURGERY

## 2017-12-08 PROCEDURE — 3209999900 FLUORO FOR SURGICAL PROCEDURES

## 2017-12-08 PROCEDURE — 82948 REAGENT STRIP/BLOOD GLUCOSE: CPT

## 2017-12-08 PROCEDURE — 3700000001 HC ADD 15 MINUTES (ANESTHESIA): Performed by: SURGERY

## 2017-12-08 RX ORDER — SODIUM CHLORIDE 450 MG/100ML
INJECTION, SOLUTION INTRAVENOUS CONTINUOUS
Status: DISCONTINUED | OUTPATIENT
Start: 2017-12-08 | End: 2017-12-08 | Stop reason: HOSPADM

## 2017-12-08 RX ORDER — ROCURONIUM BROMIDE 10 MG/ML
INJECTION, SOLUTION INTRAVENOUS PRN
Status: DISCONTINUED | OUTPATIENT
Start: 2017-12-08 | End: 2017-12-08 | Stop reason: SDUPTHER

## 2017-12-08 RX ORDER — DIPHENHYDRAMINE HYDROCHLORIDE 50 MG/ML
12.5 INJECTION INTRAMUSCULAR; INTRAVENOUS
Status: DISCONTINUED | OUTPATIENT
Start: 2017-12-08 | End: 2017-12-08 | Stop reason: HOSPADM

## 2017-12-08 RX ORDER — HYDROCODONE BITARTRATE AND ACETAMINOPHEN 7.5; 325 MG/1; MG/1
1 TABLET ORAL
Status: COMPLETED | OUTPATIENT
Start: 2017-12-08 | End: 2017-12-08

## 2017-12-08 RX ORDER — MORPHINE SULFATE 1 MG/ML
2 INJECTION, SOLUTION EPIDURAL; INTRATHECAL; INTRAVENOUS EVERY 5 MIN PRN
Status: DISCONTINUED | OUTPATIENT
Start: 2017-12-08 | End: 2017-12-08 | Stop reason: HOSPADM

## 2017-12-08 RX ORDER — LIDOCAINE HYDROCHLORIDE 10 MG/ML
INJECTION, SOLUTION EPIDURAL; INFILTRATION; INTRACAUDAL; PERINEURAL PRN
Status: DISCONTINUED | OUTPATIENT
Start: 2017-12-08 | End: 2017-12-08 | Stop reason: SDUPTHER

## 2017-12-08 RX ORDER — METOCLOPRAMIDE HYDROCHLORIDE 5 MG/ML
10 INJECTION INTRAMUSCULAR; INTRAVENOUS
Status: DISCONTINUED | OUTPATIENT
Start: 2017-12-08 | End: 2017-12-08 | Stop reason: HOSPADM

## 2017-12-08 RX ORDER — ENALAPRILAT 2.5 MG/2ML
1.25 INJECTION INTRAVENOUS
Status: DISCONTINUED | OUTPATIENT
Start: 2017-12-08 | End: 2017-12-08 | Stop reason: HOSPADM

## 2017-12-08 RX ORDER — MORPHINE SULFATE 1 MG/ML
4 INJECTION, SOLUTION EPIDURAL; INTRATHECAL; INTRAVENOUS EVERY 5 MIN PRN
Status: DISCONTINUED | OUTPATIENT
Start: 2017-12-08 | End: 2017-12-08 | Stop reason: HOSPADM

## 2017-12-08 RX ORDER — BUPIVACAINE HYDROCHLORIDE 2.5 MG/ML
INJECTION, SOLUTION INFILTRATION; PERINEURAL PRN
Status: DISCONTINUED | OUTPATIENT
Start: 2017-12-08 | End: 2017-12-08 | Stop reason: HOSPADM

## 2017-12-08 RX ORDER — LABETALOL HYDROCHLORIDE 5 MG/ML
INJECTION, SOLUTION INTRAVENOUS PRN
Status: DISCONTINUED | OUTPATIENT
Start: 2017-12-08 | End: 2017-12-08 | Stop reason: SDUPTHER

## 2017-12-08 RX ORDER — HYDROCODONE BITARTRATE AND ACETAMINOPHEN 5; 325 MG/1; MG/1
TABLET ORAL
Qty: 15 TABLET | Refills: 0 | Status: SHIPPED | OUTPATIENT
Start: 2017-12-08 | End: 2018-01-08 | Stop reason: ALTCHOICE

## 2017-12-08 RX ORDER — MIDAZOLAM HYDROCHLORIDE 1 MG/ML
INJECTION INTRAMUSCULAR; INTRAVENOUS PRN
Status: DISCONTINUED | OUTPATIENT
Start: 2017-12-08 | End: 2017-12-08 | Stop reason: SDUPTHER

## 2017-12-08 RX ORDER — HYDRALAZINE HYDROCHLORIDE 20 MG/ML
5 INJECTION INTRAMUSCULAR; INTRAVENOUS EVERY 10 MIN PRN
Status: DISCONTINUED | OUTPATIENT
Start: 2017-12-08 | End: 2017-12-08 | Stop reason: HOSPADM

## 2017-12-08 RX ORDER — FENTANYL CITRATE 50 UG/ML
INJECTION, SOLUTION INTRAMUSCULAR; INTRAVENOUS PRN
Status: DISCONTINUED | OUTPATIENT
Start: 2017-12-08 | End: 2017-12-08 | Stop reason: SDUPTHER

## 2017-12-08 RX ORDER — MEPERIDINE HYDROCHLORIDE 50 MG/ML
12.5 INJECTION INTRAMUSCULAR; INTRAVENOUS; SUBCUTANEOUS EVERY 5 MIN PRN
Status: DISCONTINUED | OUTPATIENT
Start: 2017-12-08 | End: 2017-12-08 | Stop reason: HOSPADM

## 2017-12-08 RX ORDER — EPHEDRINE SULFATE 50 MG/ML
INJECTION, SOLUTION INTRAVENOUS PRN
Status: DISCONTINUED | OUTPATIENT
Start: 2017-12-08 | End: 2017-12-08 | Stop reason: SDUPTHER

## 2017-12-08 RX ORDER — ONDANSETRON 2 MG/ML
INJECTION INTRAMUSCULAR; INTRAVENOUS PRN
Status: DISCONTINUED | OUTPATIENT
Start: 2017-12-08 | End: 2017-12-08 | Stop reason: SDUPTHER

## 2017-12-08 RX ORDER — PROMETHAZINE HYDROCHLORIDE 25 MG/ML
6.25 INJECTION, SOLUTION INTRAMUSCULAR; INTRAVENOUS
Status: DISCONTINUED | OUTPATIENT
Start: 2017-12-08 | End: 2017-12-08 | Stop reason: HOSPADM

## 2017-12-08 RX ORDER — GLYCOPYRROLATE 0.2 MG/ML
INJECTION INTRAMUSCULAR; INTRAVENOUS PRN
Status: DISCONTINUED | OUTPATIENT
Start: 2017-12-08 | End: 2017-12-08 | Stop reason: SDUPTHER

## 2017-12-08 RX ORDER — LABETALOL HYDROCHLORIDE 5 MG/ML
5 INJECTION, SOLUTION INTRAVENOUS EVERY 10 MIN PRN
Status: DISCONTINUED | OUTPATIENT
Start: 2017-12-08 | End: 2017-12-08 | Stop reason: HOSPADM

## 2017-12-08 RX ORDER — SODIUM CHLORIDE, SODIUM LACTATE, POTASSIUM CHLORIDE, CALCIUM CHLORIDE 600; 310; 30; 20 MG/100ML; MG/100ML; MG/100ML; MG/100ML
INJECTION, SOLUTION INTRAVENOUS CONTINUOUS
Status: DISCONTINUED | OUTPATIENT
Start: 2017-12-08 | End: 2017-12-08 | Stop reason: HOSPADM

## 2017-12-08 RX ORDER — PROPOFOL 10 MG/ML
INJECTION, EMULSION INTRAVENOUS PRN
Status: DISCONTINUED | OUTPATIENT
Start: 2017-12-08 | End: 2017-12-08 | Stop reason: SDUPTHER

## 2017-12-08 RX ADMIN — Medication 5 MG: at 18:21

## 2017-12-08 RX ADMIN — LABETALOL HYDROCHLORIDE 5 MG: 5 INJECTION, SOLUTION INTRAVENOUS at 17:24

## 2017-12-08 RX ADMIN — PHENYLEPHRINE HYDROCHLORIDE 300 MCG: 10 INJECTION INTRAVENOUS at 16:52

## 2017-12-08 RX ADMIN — CEFAZOLIN SODIUM 1000 MG: 1 INJECTION, SOLUTION INTRAVENOUS at 16:49

## 2017-12-08 RX ADMIN — ONDANSETRON HYDROCHLORIDE 4 MG: 2 SOLUTION INTRAMUSCULAR; INTRAVENOUS at 17:56

## 2017-12-08 RX ADMIN — ROCURONIUM BROMIDE 50 MG: 10 INJECTION INTRAVENOUS at 16:43

## 2017-12-08 RX ADMIN — NEOSTIGMINE METHYLSULFATE 3 MG: 1 INJECTION, SOLUTION INTRAMUSCULAR; INTRAVENOUS; SUBCUTANEOUS at 17:48

## 2017-12-08 RX ADMIN — GLYCOPYRROLATE 0.6 MG: 0.2 INJECTION, SOLUTION INTRAMUSCULAR; INTRAVENOUS at 17:48

## 2017-12-08 RX ADMIN — FENTANYL CITRATE 100 MCG: 50 INJECTION, SOLUTION INTRAMUSCULAR; INTRAVENOUS at 17:12

## 2017-12-08 RX ADMIN — FENTANYL CITRATE 50 MCG: 50 INJECTION, SOLUTION INTRAMUSCULAR; INTRAVENOUS at 17:30

## 2017-12-08 RX ADMIN — MIDAZOLAM HYDROCHLORIDE 1 MG: 1 INJECTION, SOLUTION INTRAMUSCULAR; INTRAVENOUS at 16:38

## 2017-12-08 RX ADMIN — LIDOCAINE HYDROCHLORIDE 50 MG: 10 INJECTION, SOLUTION EPIDURAL; INFILTRATION; INTRACAUDAL; PERINEURAL at 16:43

## 2017-12-08 RX ADMIN — PROPOFOL 120 MG: 10 INJECTION, EMULSION INTRAVENOUS at 16:43

## 2017-12-08 RX ADMIN — LABETALOL HYDROCHLORIDE 5 MG: 5 INJECTION, SOLUTION INTRAVENOUS at 17:36

## 2017-12-08 RX ADMIN — HYDROCODONE BITARTRATE AND ACETAMINOPHEN 1 TABLET: 7.5; 325 TABLET ORAL at 19:23

## 2017-12-08 RX ADMIN — FENTANYL CITRATE 50 MCG: 50 INJECTION, SOLUTION INTRAMUSCULAR; INTRAVENOUS at 16:42

## 2017-12-08 RX ADMIN — FENTANYL CITRATE 50 MCG: 50 INJECTION, SOLUTION INTRAMUSCULAR; INTRAVENOUS at 18:08

## 2017-12-08 RX ADMIN — SODIUM CHLORIDE: 4.5 INJECTION, SOLUTION INTRAVENOUS at 13:16

## 2017-12-08 RX ADMIN — EPHEDRINE SULFATE 20 MG: 50 INJECTION, SOLUTION INTRAMUSCULAR; INTRAVENOUS; SUBCUTANEOUS at 16:53

## 2017-12-08 ASSESSMENT — PAIN DESCRIPTION - DESCRIPTORS
DESCRIPTORS: ACHING
DESCRIPTORS: ACHING

## 2017-12-08 ASSESSMENT — PAIN DESCRIPTION - ORIENTATION: ORIENTATION: ANTERIOR

## 2017-12-08 ASSESSMENT — PAIN DESCRIPTION - PROGRESSION
CLINICAL_PROGRESSION: NOT CHANGED
CLINICAL_PROGRESSION: GRADUALLY IMPROVING

## 2017-12-08 ASSESSMENT — PAIN SCALES - GENERAL
PAINLEVEL_OUTOF10: 0
PAINLEVEL_OUTOF10: 10
PAINLEVEL_OUTOF10: 0
PAINLEVEL_OUTOF10: 6
PAINLEVEL_OUTOF10: 3
PAINLEVEL_OUTOF10: 0
PAINLEVEL_OUTOF10: 3
PAINLEVEL_OUTOF10: 0

## 2017-12-08 ASSESSMENT — PAIN DESCRIPTION - ONSET
ONSET: AWAKENED FROM SLEEP
ONSET: AWAKENED FROM SLEEP

## 2017-12-08 ASSESSMENT — PAIN - FUNCTIONAL ASSESSMENT: PAIN_FUNCTIONAL_ASSESSMENT: 0-10

## 2017-12-08 ASSESSMENT — PAIN DESCRIPTION - PAIN TYPE
TYPE: SURGICAL PAIN
TYPE: SURGICAL PAIN

## 2017-12-08 ASSESSMENT — PAIN DESCRIPTION - LOCATION
LOCATION: ABDOMEN
LOCATION: ABDOMEN

## 2017-12-08 ASSESSMENT — LIFESTYLE VARIABLES: SMOKING_STATUS: 1

## 2017-12-08 NOTE — H&P
HISTORY OF PRESENT ILLNESS:  Amarilis Fields is a pleasant 60-year-old male well known to us due to previous peritoneal dialysis catheter placement. He now presents with anorexia and midepigastric abdominal pain. Diagnostic studies have revealed gallstones. There is no reported change in bowel habits or fevers chills or jaundice. I have discussed his peritoneal dialysis with the dialysis unit. They are going to try to forego placement of a permacath and hemodialysis.     We discussed the pathophysiology of gallbladder disease and the risk benefits and alternatives of surgery. Keerthi Hendrickson wishes to proceed.          Patient Active Problem List     Diagnosis Date Noted    Chronic kidney disease (CKD), stage V ([facility] [address] 05/26/2017    CAD (coronary artery disease) 01/31/2015    Diabetes mellitus ([facility] 01/31/2015    Hypertension 01/31/2015    Hypercholesteremia 01/31/2015    Cigarette smoker 01/31/2015    Pacemaker 01/31/2015      Current Facility-Administered Medications          Current Outpatient Prescriptions   Medication Sig Dispense Refill    hyoscyamine (NULEV) 125 MCG TBDP dispersible tablet One po/sl qac/prn 20 tablet 0    HYDROcodone-acetaminophen (NORCO) 7.5-325 MG per tablet Take 1 tablet by mouth every 6 hours as needed for Pain .  15 tablet 0    ondansetron (ZOFRAN ODT) 4 MG disintegrating tablet Take 1 tablet by mouth every 8 hours as needed for Nausea or Vomiting 10 tablet 0    OXYGEN Inhale 2 L into the lungs nightly Indications: nightly        fluticasone-salmeterol (ADVAIR) 100-50 MCG/DOSE diskus inhaler Inhale 1 puff into the lungs every 12 hours        allopurinol (ZYLOPRIM) 100 MG tablet 100 mg daily Indications: Arthritis         amLODIPine (NORVASC) 10 MG tablet Take 10 mg by mouth daily Indications: High Blood Pressure         vitamin D (ERGOCALCIFEROL) 20896 UNITS CAPS capsule 1.25 Units Monthly        gabapentin (NEURONTIN) 300 MG capsule 300 mg 2 times daily Indications: Diabetes with PACEMAKER INSERTION         also pacemaker replacement         Family History         Family History   Problem Relation Age of Onset    Heart Disease Mother      Heart Disease Father                 Social History   Substance Use Topics    Smoking status: Current Every Day Smoker       Packs/day: 0.50    Smokeless tobacco: Never Used    Alcohol use No         REVIEW OF SYSTEMS:  14 point review of systems reviewed and positive for the above     PHYSICAL EXAMINATION:  Blood pressure 120/68, temperature 97.4 °F (36.3 °C), temperature source Temporal, height 5' 6\" (1.676 m), weight 188 lb 9.6 oz (85.5 kg).     GENERAL:  Reveals a 74 y.o. male that  appears to be in no acute distress.     HEENT:  Head is normocephalic and atraumatic.     NECK:  Neck is supple without masses or carotid bruits. No obvious thyromegaly is grossly noted.     CHEST:  Patient has normal respiratory effort. Chest is clear bilaterally with good thoracic expansion.       HEART:  Heart demonstrated a regular rhythm and rate with no cardiac murmurs, gallops or rubs noted to auscultation.     ABDOMEN:  Inspection of the abdomen demonstrated the patient to have normal bowel sounds present. The abdomen is soft and nontender. No costovertebral tenderness is noted to percussion bilaterally. Peritoneal dialysis catheter in place.     EXTREMITIES:  Extremities demonstrated no cyanosis or pitting edema bilaterally.     PSYCHIATRIC:  Patient is oriented to time, place and person. The patient's mood and affect are normal.        IMPRESSION:  Chronic Cholecystitis with Cholelithiasis  Renal failure currently on peritoneal dialysis     PLAN:  The risks, benefits, and options were discussed with the patient. He  is willing to proceed with surgery.

## 2017-12-08 NOTE — ANESTHESIA PRE PROCEDURE
Gastroesophageal Reflux Disease  11/2/16  Yes Historical Provider, MD   SITagliptin (JANUVIA) 25 MG tablet Take 25 mg by mouth daily Indications: Diabetes  10/24/16  Yes Historical Provider, MD   aspirin 81 MG tablet Take 81 mg by mouth daily   Yes Historical Provider, MD   gabapentin (NEURONTIN) 300 MG capsule 300 mg 2 times daily Indications: Diabetes with Nerve Disease  10/10/16   Historical Provider, MD       Current medications:    No current facility-administered medications for this encounter. Allergies:  No Known Allergies    Problem List:    Patient Active Problem List   Diagnosis Code    CAD (coronary artery disease) I25.10    Diabetes mellitus ([address] E11.9    Hypertension I10    Hypercholesteremia E78.00    Cigarette smoker F17.210    Pacemaker Z95.0    Chronic kidney disease (CKD), stage V ([address] N18.5       Past Medical History:        Diagnosis Date    CAD (coronary artery disease) 1/31/2015    sees [facility] heart group.  CHF (congestive heart failure) (HCC)     Chronic kidney disease (CKD), stage V ([address] 5/26/2017    Cigarette smoker 1/31/2015    COPD (ch[facility] obstructive pulmonary disease) ([facility] [address]     Diabetes mellitus ([facility] [address] 1/31/2015    Hypercholesteremia 1/31/2015    Hypertension 1/31/2015    Pacemaker 1/31/2015    Peritoneal dialysis catheter dysfunction ([facility] [address]     Sleep apnea     No MACHINE       Past Surgical History:        Procedure Laterality Date    CARDIAC CATHETERIZATION  2/2/15  JDT    stent to mid RCA.  EF 45%    CARPAL TUNNEL RELEASE Right     CORONARY ARTERY BYPASS GRAFT  2004    ELBOW SURGERY Right     HERNIA REPAIR  9886?    umbilical hernia repair    KNEE ARTHROSCOPY Left     LAPAROSCOPY INSERTION PERITONEAL CATHETER N/A 5/26/2017    LAPAROSCOPIC INSERTION PERITONEAL DIALYSIS CATHETER WITH OMENTOPEXY performed by Wyatt Rodriguez MD at [address] [place] [address] N/A 7/17/2017    DIAGNOSTIC LAPAROSCOPY performed by Wyatt Rodriguez MD at 11/19/2017        Type & Screen (If Applicable):  No results found for: LABABO, [address]    Anesthesia Evaluation  Patient summary reviewed and Nursing notes reviewed no history of anesthetic complications:   Airway: Mallampati: I  TM distance: >3 FB   Neck ROM: full   Dental:    (+) upper dentures and lower dentures      Pulmonary:   (+) COPD:  sleep apnea: on noncompliant,  current smoker          Patient did not smoke on day of surgery. Cardiovascular:    (+) hypertension:, pacemaker (Medtronic): pacemaker, CAD:, CABG/stent:,       ECG reviewed               Beta Blocker:  Dose within 24 Hrs         Neuro/Psych:   Negative Neuro/Psych ROS              GI/Hepatic/Renal:   (+) renal disease (Peritoneal dialysis): CRI, ESRD and dialysis,           Endo/Other:    (+) Type II DM, , .                 Abdominal:           Vascular:                                      Anesthesia Plan      general     ASA 3     (Decadron/Zofran Intraop)  Induction: intravenous. BIS  MIPS: Postoperative opioids intended and Prophylactic antiemetics administered. Anesthetic plan and risks discussed with patient.                       Abby Wynne MD   12/8/2017

## 2017-12-09 NOTE — OP NOTE
Dictated
the gallbladder retrograde  using Bovie electrocautery. We brought the gallbladder out through the  umbilicus using the EndoCatch. We then irrigated copiously and made sure  good hemostasis, withdrew instrumentation, closed the fascial defect of the  umbilicus and epigastrium with 0 Vicryl sutures, subcu with 3-0 Vicryl and  skin with 4-0 Monocryl. Sterile dressings were applied. Estimated blood  loss minimal.  Complications none. He tolerated the procedure well.         Dejah Medellin MD    D: 12/08/2017 19:04:12       T: 12/09/2017 3:27:11     [id number]  Job#: [id number]     Doc#: [id number]    CC:

## 2017-12-09 NOTE — PROGRESS NOTES
Patient tired to jump in the floor. He was angry at the time. Pa, CRNA and RN at bedside to reorientate.      Electronically signed by Chanell Stokes RN on 12/8/2017 at 6:31 PM

## 2017-12-18 LAB — POTASSIUM SERPL-SCNC: 3.4 MMOL/L (ref 3.5–5)

## 2017-12-19 ENCOUNTER — CLINICAL SUPPORT (OUTPATIENT)
Dept: CARDIOLOGY | Facility: CLINIC | Age: 67
End: 2017-12-19

## 2017-12-19 DIAGNOSIS — Z95.0 PACEMAKER: ICD-10-CM

## 2017-12-19 PROCEDURE — 93296 REM INTERROG EVL PM/IDS: CPT | Performed by: PHYSICIAN ASSISTANT

## 2017-12-19 PROCEDURE — 93294 REM INTERROG EVL PM/LDLS PM: CPT | Performed by: PHYSICIAN ASSISTANT

## 2018-01-02 NOTE — PROGRESS NOTES
Dual Chamber Pacemaker Evaluation Report  [facility]    January 2, 2018    Primary Cardiologist: Megan  : Medtronic Model: Adapta  Implant date: 11/21/14    Reason for evaluation: routine  Indication for pacemaker: sick sinus syndrome and tachy-yesi syndrome    Measurements  Atrial sensing - P wave: >2.8 mV  Atrial threshold: 0.625 V@ 0.4 ms  Atrial lead impedance: 426 ohms  Ventricular sensing - R wave: n/r  Ventricular threshold: 1.0 V @ 0.4 ms  Ventricular lead impedance:   989 ohms     Diagnostic Data  Atrial paced: 41.3 %  Ventricular paced: 98.7 %  Other: AF alerts noted.  Not certain it's actual AF.  Will d/w Dr. Parker.  Battery status: satisfactory         Final Parameters  Mode:  DDDR  Lower rate: 60 bpm   Upper rate: 130 bpm  AV Delay: paced- 180 ms  Sensed-150 ms  Atrial - Amplitude: 1.5 V   Pulse width: 0.4 ms   Sensitivity: 0.5 mV     Ventricular - Amplitude: 2.0 V  Pulse width: 0.4 ms  Sensitivity: 2.0 mV    Changes made: n/a  Conclusions: normal pacemaker function    Follow up: 6 months

## 2018-01-03 NOTE — PROGRESS NOTES
I have reviewed the notes, assessments, and/or procedures performed by Cindy James, I concur with her/his documentation of Blaise Vanessa.

## 2018-01-08 ENCOUNTER — OFFICE VISIT (OUTPATIENT)
Dept: SURGERY | Age: 68
End: 2018-01-08

## 2018-01-08 VITALS
TEMPERATURE: 98.6 F | BODY MASS INDEX: 27.8 KG/M2 | WEIGHT: 173 LBS | DIASTOLIC BLOOD PRESSURE: 70 MMHG | HEIGHT: 66 IN | SYSTOLIC BLOOD PRESSURE: 130 MMHG

## 2018-01-08 DIAGNOSIS — Z90.49 S/P LAPAROSCOPIC CHOLECYSTECTOMY: Primary | ICD-10-CM

## 2018-01-08 PROCEDURE — 99024 POSTOP FOLLOW-UP VISIT: CPT | Performed by: PHYSICIAN ASSISTANT

## 2018-06-11 ENCOUNTER — CLINICAL SUPPORT (OUTPATIENT)
Dept: CARDIOLOGY | Facility: CLINIC | Age: 68
End: 2018-06-11

## 2018-06-11 DIAGNOSIS — I49.5 SINUS NODE DYSFUNCTION (HCC): Primary | ICD-10-CM

## 2018-06-11 DIAGNOSIS — Z95.0 PACEMAKER: ICD-10-CM

## 2018-06-11 PROCEDURE — 93288 INTERROG EVL PM/LDLS PM IP: CPT | Performed by: NURSE PRACTITIONER

## 2018-06-11 NOTE — PROGRESS NOTES
Pacemaker Evaluation Report    June 11, 2018    Indication for pacemaker: sinus node dysfunction - tachy/yesi    Implanting MD: Dr. Howe    : Medtronic  Model: Adapta [id number]    Implant Date: 11/21/14    Reason For Evaluation: routine      DIAGNOSTIC DATA    Atrial paced: 45.1%  Ventricular paced: 98.6%    Battery status: satisfactory  Voltage: 2.79 V  Estimated battery life: 10.5 years    7 atrial high rate episodes, most in October - December 2017, none since 12/8/2017.     FINAL PARAMETERS    Changes made: none    Conclusions: normal pacemaker function, stable pacing and sensing thresholds and adequate battery reserve    Return in about 6 months (around 12/11/2018) for [facility], 1 year in [facility], reschedule clinic appt.

## 2018-07-11 PROBLEM — N18.9 ANEMIA OF CHRONIC KIDNEY FAILURE: Status: ACTIVE | Noted: 2018-07-11

## 2018-07-11 PROBLEM — D63.1 ANEMIA OF CHRONIC KIDNEY FAILURE: Status: ACTIVE | Noted: 2018-07-11

## 2018-07-16 ENCOUNTER — OFFICE VISIT (OUTPATIENT)
Dept: CARDIOLOGY | Facility: CLINIC | Age: 68
End: 2018-07-16

## 2018-07-16 ENCOUNTER — HOSPITAL ENCOUNTER (OUTPATIENT)
Dept: INFUSION THERAPY | Age: 68
Setting detail: SPECIMEN
Discharge: HOME | End: 2018-07-16
Payer: COMMERCIAL

## 2018-07-16 VITALS
WEIGHT: 175 LBS | HEART RATE: 92 BPM | SYSTOLIC BLOOD PRESSURE: 118 MMHG | DIASTOLIC BLOOD PRESSURE: 69 MMHG | HEIGHT: 66 IN | TEMPERATURE: 97.6 F | RESPIRATION RATE: 17 BRPM | OXYGEN SATURATION: 95 % | BODY MASS INDEX: 28.12 KG/M2

## 2018-07-16 VITALS
HEIGHT: 66 IN | DIASTOLIC BLOOD PRESSURE: 58 MMHG | HEART RATE: 79 BPM | SYSTOLIC BLOOD PRESSURE: 116 MMHG | OXYGEN SATURATION: 94 % | WEIGHT: 175 LBS | BODY MASS INDEX: 28.12 KG/M2

## 2018-07-16 DIAGNOSIS — I50.32 CHRONIC DIASTOLIC HEART FAILURE (HCC): Chronic | ICD-10-CM

## 2018-07-16 DIAGNOSIS — I25.10 CORONARY ARTERY DISEASE INVOLVING NATIVE CORONARY ARTERY OF NATIVE HEART WITHOUT ANGINA PECTORIS: Chronic | ICD-10-CM

## 2018-07-16 DIAGNOSIS — I10 ESSENTIAL HYPERTENSION: Chronic | ICD-10-CM

## 2018-07-16 DIAGNOSIS — D63.1 ANEMIA OF CHRONIC RENAL FAILURE, UNSPECIFIED CKD STAGE: ICD-10-CM

## 2018-07-16 DIAGNOSIS — Z72.0 TOBACCO ABUSE: Chronic | ICD-10-CM

## 2018-07-16 DIAGNOSIS — Z95.1 S/P CABG X 3: Primary | ICD-10-CM

## 2018-07-16 DIAGNOSIS — R09.89 CAROTID BRUIT, UNSPECIFIED LATERALITY: ICD-10-CM

## 2018-07-16 DIAGNOSIS — E78.2 MIXED DYSLIPIDEMIA: Chronic | ICD-10-CM

## 2018-07-16 DIAGNOSIS — N18.9 ANEMIA OF CHRONIC RENAL FAILURE, UNSPECIFIED CKD STAGE: ICD-10-CM

## 2018-07-16 PROCEDURE — 2580000003 HC RX 258: Performed by: CLINICAL NURSE SPECIALIST

## 2018-07-16 PROCEDURE — 99406 BEHAV CHNG SMOKING 3-10 MIN: CPT | Performed by: NURSE PRACTITIONER

## 2018-07-16 PROCEDURE — 93000 ELECTROCARDIOGRAM COMPLETE: CPT | Performed by: NURSE PRACTITIONER

## 2018-07-16 PROCEDURE — 99214 OFFICE O/P EST MOD 30 MIN: CPT | Performed by: NURSE PRACTITIONER

## 2018-07-16 PROCEDURE — 6360000002 HC RX W HCPCS: Performed by: CLINICAL NURSE SPECIALIST

## 2018-07-16 PROCEDURE — 96365 THER/PROPH/DIAG IV INF INIT: CPT

## 2018-07-16 RX ORDER — CALCITRIOL 0.25 UG/1
0.25 CAPSULE, LIQUID FILLED ORAL DAILY
COMMUNITY

## 2018-07-16 RX ORDER — CALCITRIOL 0.25 UG/1
0.25 CAPSULE, LIQUID FILLED ORAL DAILY
COMMUNITY
End: 2020-01-01

## 2018-07-16 RX ORDER — BUMETANIDE 2 MG/1
2 TABLET ORAL DAILY
COMMUNITY

## 2018-07-16 RX ADMIN — FERUMOXYTOL 510 MG: 510 INJECTION INTRAVENOUS at 13:04

## 2018-07-16 NOTE — PROGRESS NOTES
Subjective:     Encounter Date:07/16/2018    Chief Complaint:    Patient ID: Blaise Vanessa is a 68 y.o. male here today for yearly cardiac follow-up.    HPI     Coronary Artery Disease    Additional comments: no chest discomfort, s/p CABG 2004, last stress test nuclear 2016 with inferolateral infarct and yahaira-infarct ischemia.             Congestive Heart Failure    Additional comments: still having a little swelling in his feet that goes away after nocturnal peritoneal dialysis. No orthonpea or PND.        Last edited by CASS March on 7/16/2018 11:05 AM. (History)        History:   Past Medical History:   Diagnosis Date   • Arthritis    • CAD (coronary artery disease)    • Cataract    • CHF (congestive heart failure) (CMS/[facility])    • Chronic renal disease, stage IV (CMS/[facility])    • Diabetes mellitus (CMS/[facility])    • Edema    • Hyperlipidemia    • Hyperparathyroidism (CMS/[facility])    • Hypertension    • Hyperuricemia    • Peritoneal dialysis catheter in place (CMS/[facility])    • Vitamin D deficiency      Past Surgical History:   Procedure Laterality Date   • CARDIAC PACEMAKER PLACEMENT     • CATARACT EXTRACTION, BILATERAL     • CORONARY ARTERY BYPASS GRAFT     • HERNIA REPAIR     • KNEE SURGERY     • WRIST SURGERY       Social History     Social History   • Marital status:      Spouse name: N/A   • Number of children: N/A   • Years of education: N/A     Occupational History   • Disabled      Social History Main Topics   • Smoking status: Current Every Day Smoker     Packs/day: 0.75     Years: 50.00     Types: Cigarettes     Start date: 1966   • Smokeless tobacco: Never Used      Comment: up to 2 ppd, has quit for a year in the past on his own   • Alcohol use No      Comment: previously drank beer and whiskey but not in excess   • Drug use: No   • Sexual activity: Defer     Other Topics Concern   • Not on file     Social History Narrative   • No narrative on file     Family History   Problem Relation Age  of Onset   • Heart disease Mother    • Heart attack Mother    • Heart disease Father    • Heart attack Father        Outpatient Prescriptions Marked as Taking for the 7/16/18 encounter (Office Visit) with CASS March   Medication Sig Dispense Refill   • ADVAIR DISKUS 250-50 MCG/DOSE DISKUS Inhale 2 puffs As Needed.     • allopurinol (ZYLOPRIM) 100 MG tablet 100 mg 2 (Two) Times a Day.  5   • amLODIPine (NORVASC) 10 MG tablet Take 5 mg by mouth 2 (Two) Times a Day. 1/2 tab bid      • aspirin 81 MG tablet Take 81 mg by mouth Daily.     • atorvastatin (LIPITOR) 40 MG tablet Take 40 mg by mouth Every Night.     • B Complex-C-Folic Acid (DIALYVITE 800 PO) Take 800 mg by mouth 2 (Two) Times a Day.     • bumetanide (BUMEX) 2 MG tablet Take 2 mg by mouth Daily.     • calcitriol (ROCALTROL) 0.25 MCG capsule Take 0.25 mcg by mouth Daily.     • carvedilol (COREG) 12.5 MG tablet 12.5 mg 2 (Two) Times a Day With Meals.     • gentamicin (GARAMYCIN) 0.1 % cream Apply 0.1 application topically Daily.  3   • iron sucrose (VENOFER) 20 MG/ML injection Infuse 100 mg into a venous catheter Every 30 (Thirty) Days. As needed.     • JANUVIA 50 MG tablet Take 50 mg by mouth Daily.  2   • minoxidil (LONITEN) 2.5 MG tablet Take 2.5-5 mg by mouth 2 (Two) Times a Day. 2.5 mg am, 5 mg pm     • O2 (OXYGEN) Inhale 2 L Every Night. Hasn't needed as much since losing weight     • ONE TOUCH ULTRA TEST test strip USE TO TEST BLOOD SUGAR DAILY  0   • POTASSIUM CHLORIDE ER PO Take 25 mEq by mouth Daily.     • raNITIdine (ZANTAC) 300 MG tablet Take 300 mg by mouth Daily.  5   • vitamin D (ERGOCALCIFEROL) 74968 UNITS capsule capsule Take 50,000 Units by mouth Every 30 (Thirty) Days.  1       Review of Systems:  Review of Systems   Constitution: Positive for decreased appetite, malaise/fatigue and weight loss (unintentional). Negative for chills, fever, weakness and weight gain.   HENT: Negative for congestion, hearing loss, nosebleeds, sore  throat and tinnitus.    Eyes: Positive for blurred vision (no recent changes). Negative for double vision, pain, photophobia and visual disturbance.   Cardiovascular: Positive for dyspnea on exertion and leg swelling. Negative for chest pain, claudication, cyanosis, irregular heartbeat, near-syncope, orthopnea, palpitations, paroxysmal nocturnal dyspnea and syncope.   Respiratory: Positive for shortness of breath. Negative for cough, hemoptysis and wheezing.    Endocrine: Positive for cold intolerance and heat intolerance. Negative for polyuria.   Hematologic/Lymphatic: Negative for adenopathy and bleeding problem. Bruises/bleeds easily (bruises easily on aspirin, platelets are low - sees Dr. Urbina 7/16/18).   Skin: Positive for dry skin. Negative for flushing, itching and rash.   Musculoskeletal: Positive for back pain, joint pain (knees and hips) and muscle cramps (especially if he walks a lot, hasn't had leg arteries checked in 2 years - nothing they could do - saw Dr. De Santiago but not following up with him). Negative for arthritis, joint swelling, muscle weakness, neck pain and stiffness.   Gastrointestinal: Positive for abdominal pain (dull, comes and goes, seeing gastro 7/27/18 - has PD tube), diarrhea (2-3 times per week sometimes), heartburn (not as bad) and melena (every once in while when has diarrhea). Negative for anorexia, change in bowel habit, constipation, nausea and vomiting.   Genitourinary: Negative for dysuria, flank pain and hematuria.   Neurological: Positive for loss of balance (a little) and numbness (feet - not worse off neurontin). Negative for dizziness (when getting up in the mornings ), headaches (occ), light-headedness, seizures and tremors.   Psychiatric/Behavioral: Negative for altered mental status, depression, hallucinations and substance abuse. The patient does not have insomnia and is not nervous/anxious.    Allergic/Immunologic: Negative for hives and persistent infections.   All  "other systems reviewed and are negative.           Objective:   /58 (BP Location: Left arm, Patient Position: Sitting, Cuff Size: Adult)   Pulse 79   Ht 167.6 cm (66\")   Wt 79.4 kg (175 lb)   SpO2 94%   BMI 28.25 kg/m²   Wt Readings from Last 3 Encounters:   07/16/18 79.4 kg (175 lb)   07/14/17 87.1 kg (192 lb)   12/16/16 93 kg (205 lb)         Physical Exam   Constitutional: He is oriented to person, place, and time. He appears well-developed and well-nourished.   Eyes: No scleral icterus.   Neck: No JVD present. Carotid bruit is present (faint, bilateral).   thick   Cardiovascular: Normal rate, regular rhythm, normal heart sounds and intact distal pulses.  Exam reveals no gallop and no friction rub.    No murmur heard.  Pulmonary/Chest: Effort normal and breath sounds normal.   Musculoskeletal: He exhibits edema (1+ pitting distal BLEs).   Neurological: He is alert and oriented to person, place, and time.   Skin: Skin is warm and dry.   Psychiatric: He has a normal mood and affect.   Vitals reviewed.      Lab/Diagnostics Review:  7/10/2018  CBC WBC 5050, hemoglobin 12 point 8, hematocrit 37.8%, platelets 93,000  CMP sodium 145, potassium 3.8,  chloride 108, CO2 28 , calcium 8.9, glucose 85 , total protein 5.1, albumin 3.2 , magnesium 1.8 , alkaline phosphatase 75      Iron 42, TIBC 255, transferrin saturation 16%     05/30/2017   CBC WBC 4460, hemoglobin 11.7, hematocrit 34.7%, platelets 91,000  Chemistry sodium 145, potassium 4, chloride 111, BUN 42, creatinine 4.61  Magnesium 2.5  Iron 33, TIBC 245, transferrin saturation 13%  Hemoglobin A1c 5.9%, folate 9.2, vitamin B12 358, vitamin D 25 OH 40.5     05/01/2017   CBC WBC 5600, 1113, hematocrit 40.4%, platelets 90,000  Renal panel  sodium 149, potassium 4, chloride 111, CO2 27.4, BUN 51, creatinine 4.87, albumin 3.5, calcium 11.7, glucose 102, phosphorus 4.5, uric acid 5     12/13/2016 lipid panel total cholesterol 128, triglycerides 126, HDL " 32, LDL 71     Echocardiogram:  · 10/7/2014 Echocardiogram. [facility]. Normal LV systolic function, LVEF 60%. No regional wall motion abnormalities. Mild to moderate LVH (septal wall thickness 1.6, posterior wall thickness 1.4).Grade 2 diastolic dysfunction. Normal RV size and systolic function. Mild left atrial dilation. Aortic valve sclerosis without stenosis. Mitral annular calcification. Trace MR. Mild TR. Mild PI. Estimated RVSP 33 mmHg.  · 12/5/2016 Echocardiogram. [facility]. Normal LV Systolic Function, LVEF 55-60% grade 1 diastolic dysfunction. Normal RV size and systolic function. Mild left atrial dilation. Mild aortic valve sclerosis. Mild mitral calcification. Mild MR.     2/5/2016 Lexiscan Nuclear Stress Test. [facility]. Inferior lateral infarct with yahaira-infarct ischemia. LVEF 55%.     10/4/2016 EKG. Atrial fibrillation with ventricular pacing, rate 60 BPM.     12/13/2016 Pulmonary Function Studies. [facility]. FEV1 1.62 (62%), FVC 2.35 (73%), FEV1/FVC 69%, FEF 25-75% 0.98 (36%). TLC 4.48 (83%), vital capacity 2.47 (76%), residual volume 2.01 (96%). DLCO 12.1 (55%), DLCO/VA 4.33 (114%).     12/13/2016 Arterial Blood Gas. [facility]. PH 7.31, PCO2 45, PO2 55, bicarbonate 22.7, base excess -3.7, oxygen saturation 88.5%.     12/5/2016. PA and Lateral Chest X-Ray. [facility]. Left basilar atelectasis, pneumonitis, and/or pulmonary parenchymal scarring which is slightly improved compared to prior examination dated 10/8/2016. Sternotomy wires noted. Presence of dual chamber pacemaker     01/31/2015 cardiac catheterization 2.5x33 mm Xience Alpine drug-eluting stent to mid right coronary artery.  Patent LIMA to LAD with native LAD mid 80% stenosis.  Diffuse luminal irregularities left circumflex mid marginal branch.  Right coronary artery % stenosis with a long 90% mid right coronary artery stenosis prior to the PDA.  CARMELO widely patent nongrafted suitable for bypass grafting in the future if needed.  Occluded saphenous vein graft to  the obtuse marginal.  Occluded saphenous vein graft to the PDA.  Inferior basilar akinesis with EF 45%. Keli.      ECG 12 Lead  Date/Time: 2018 9:54 AM  Performed by: ADAL MCKENNA  Authorized by: ADAL MCKENNA   Comparison: compared with previous ECG from 10/4/2016  Comparison to previous ECG: PVC is new  Rhythm: paced  Ectopy: infrequent PVCs  Rate: normal  BPM: 65  Pacin% capture  Clinical impression: abnormal ECG                Assessment/Plan:         Problem List Items Addressed This Visit        Cardiovascular and Mediastinum    Chronic diastolic heart failure (CMS/HCC) (Chronic)    Current Assessment & Plan     Stable, class II-III, Stage C. Encouraged gradual increase in aerobic activity.          CAD (coronary artery disease) (Chronic)    Overview     ·  Myocardial infarction  · 2015 Cardiac catheterization  · 2015 PCI with Xience drug eluting stent placement to mid RCA 90% stenosis. Known occluded PDA and occluded SVG to PDA.         Current Assessment & Plan     Stable, continue medical management. May need to repeat nuclear stress test if decides to proceed with knee surgery.          Relevant Orders    ECG 12 Lead (Completed)    Hypertension (Chronic)    Current Assessment & Plan     Well controlled on multiple medications. Continue present therapy.         Relevant Medications    bumetanide (BUMEX) 2 MG tablet    Carotid bruit    Current Assessment & Plan     Check carotid US         Relevant Orders    US Carotid Bilateral       Other    Tobacco abuse (Chronic)    Current Assessment & Plan     He declines pharmacologic treatment. States he will quit on his own. Counseled on smoking cessation strategies and QUITNOWKY resources for approximately 4 minutes.          S/P CABG x 3 - Primary (Chronic)    Overview     · Rubens Medrano.           Mixed dyslipidemia (Chronic)    Overview     · 2016 Total cholesterol 128, triglyceride 126, HDL 32, LDL 71          Current Assessment & Plan     Hasn't been checked in over a year. Ran out of atorvastatin about a month ago. Wants to check off meds. Will need refills.          Relevant Orders    Lipid Panel        Return in about 1 year (around 7/16/2019) for Recheck.           Cindy Chavarria, APRN, ACNP-BC, CHFN-BC

## 2018-07-16 NOTE — ASSESSMENT & PLAN NOTE
Hasn't been checked in over a year. Ran out of atorvastatin about a month ago. Wants to check off meds. Will need refills.

## 2018-07-16 NOTE — ASSESSMENT & PLAN NOTE
He declines pharmacologic treatment. States he will quit on his own. Counseled on smoking cessation strategies and QUITNOWKY resources for approximately 4 minutes.

## 2018-07-16 NOTE — ASSESSMENT & PLAN NOTE
Stable, continue medical management. May need to repeat nuclear stress test if decides to proceed with knee surgery.

## 2018-07-18 ENCOUNTER — RESULTS ENCOUNTER (OUTPATIENT)
Dept: CARDIOLOGY | Facility: CLINIC | Age: 68
End: 2018-07-18

## 2018-07-18 DIAGNOSIS — E78.2 MIXED DYSLIPIDEMIA: Chronic | ICD-10-CM

## 2018-07-23 ENCOUNTER — HOSPITAL ENCOUNTER (OUTPATIENT)
Dept: INFUSION THERAPY | Age: 68
Setting detail: INFUSION SERIES
Discharge: HOME | End: 2018-07-23
Payer: COMMERCIAL

## 2018-07-23 ENCOUNTER — TELEPHONE (OUTPATIENT)
Dept: CARDIOLOGY | Facility: CLINIC | Age: 68
End: 2018-07-23

## 2018-07-23 VITALS
HEART RATE: 60 BPM | SYSTOLIC BLOOD PRESSURE: 112 MMHG | RESPIRATION RATE: 16 BRPM | TEMPERATURE: 98.2 F | DIASTOLIC BLOOD PRESSURE: 67 MMHG | OXYGEN SATURATION: 92 %

## 2018-07-23 DIAGNOSIS — D63.1 ANEMIA OF CHRONIC RENAL FAILURE, UNSPECIFIED CKD STAGE: ICD-10-CM

## 2018-07-23 DIAGNOSIS — R09.89 CAROTID BRUIT, UNSPECIFIED LATERALITY: ICD-10-CM

## 2018-07-23 DIAGNOSIS — N18.9 ANEMIA OF CHRONIC RENAL FAILURE, UNSPECIFIED CKD STAGE: ICD-10-CM

## 2018-07-23 PROCEDURE — 96365 THER/PROPH/DIAG IV INF INIT: CPT

## 2018-07-23 PROCEDURE — 2580000003 HC RX 258: Performed by: CLINICAL NURSE SPECIALIST

## 2018-07-23 PROCEDURE — 6360000002 HC RX W HCPCS: Performed by: CLINICAL NURSE SPECIALIST

## 2018-07-23 RX ADMIN — FERUMOXYTOL 510 MG: 510 INJECTION INTRAVENOUS at 08:28

## 2018-07-23 NOTE — TELEPHONE ENCOUNTER
----- Message from CASS March sent at 7/23/2018 12:01 PM CDT -----  Moderate calcified plaque of external carotids but no hemodynamically significant internal carotid artery stenosis. No need for vascular surgery referral at this time. Continue present therapy and risk factor modification.    CALLED INFORMED PT OF RESULTS INFORMED HIM THAT THAT WAS NO REASON TO HAVE VASCULAR SURGERY AT THIS TIME AND TO CONTINUE PRESENT THERAPY PT VOICED UNDERSTANDING

## 2018-07-23 NOTE — PROGRESS NOTES
Moderate calcified plaque of external carotids but no hemodynamically significant internal carotid artery stenosis. No need for vascular surgery referral at this time. Continue present therapy and risk factor modification.

## 2018-08-24 ENCOUNTER — TELEPHONE (OUTPATIENT)
Dept: GASTROENTEROLOGY | Age: 68
End: 2018-08-24

## 2018-08-28 ENCOUNTER — OFFICE VISIT (OUTPATIENT)
Dept: GASTROENTEROLOGY | Age: 68
End: 2018-08-28
Payer: COMMERCIAL

## 2018-08-28 VITALS
HEART RATE: 84 BPM | SYSTOLIC BLOOD PRESSURE: 110 MMHG | WEIGHT: 174.2 LBS | HEIGHT: 66 IN | DIASTOLIC BLOOD PRESSURE: 70 MMHG | BODY MASS INDEX: 28 KG/M2 | OXYGEN SATURATION: 95 %

## 2018-08-28 DIAGNOSIS — R19.7 DIARRHEA, UNSPECIFIED TYPE: ICD-10-CM

## 2018-08-28 DIAGNOSIS — Z90.49 S/P CHOLECYSTECTOMY: ICD-10-CM

## 2018-08-28 DIAGNOSIS — R10.31 BILATERAL LOWER ABDOMINAL CRAMPING: ICD-10-CM

## 2018-08-28 DIAGNOSIS — R13.10 DYSPHAGIA, UNSPECIFIED TYPE: ICD-10-CM

## 2018-08-28 DIAGNOSIS — Z80.0 FAMILY HISTORY OF COLON CANCER: Primary | ICD-10-CM

## 2018-08-28 DIAGNOSIS — N18.5 CHRONIC KIDNEY DISEASE (CKD), STAGE V (HCC): Chronic | ICD-10-CM

## 2018-08-28 DIAGNOSIS — R63.4 WEIGHT LOSS: ICD-10-CM

## 2018-08-28 DIAGNOSIS — R12 CHRONIC HEARTBURN: ICD-10-CM

## 2018-08-28 DIAGNOSIS — R11.0 CHRONIC NAUSEA: ICD-10-CM

## 2018-08-28 DIAGNOSIS — R10.32 BILATERAL LOWER ABDOMINAL CRAMPING: ICD-10-CM

## 2018-08-28 DIAGNOSIS — R19.5 DARK STOOLS: ICD-10-CM

## 2018-08-28 PROCEDURE — 4040F PNEUMOC VAC/ADMIN/RCVD: CPT | Performed by: NURSE PRACTITIONER

## 2018-08-28 PROCEDURE — 3017F COLORECTAL CA SCREEN DOC REV: CPT | Performed by: NURSE PRACTITIONER

## 2018-08-28 PROCEDURE — G8598 ASA/ANTIPLAT THER USED: HCPCS | Performed by: NURSE PRACTITIONER

## 2018-08-28 PROCEDURE — 4004F PT TOBACCO SCREEN RCVD TLK: CPT | Performed by: NURSE PRACTITIONER

## 2018-08-28 PROCEDURE — 1123F ACP DISCUSS/DSCN MKR DOCD: CPT | Performed by: NURSE PRACTITIONER

## 2018-08-28 PROCEDURE — G8427 DOCREV CUR MEDS BY ELIG CLIN: HCPCS | Performed by: NURSE PRACTITIONER

## 2018-08-28 PROCEDURE — G8417 CALC BMI ABV UP PARAM F/U: HCPCS | Performed by: NURSE PRACTITIONER

## 2018-08-28 PROCEDURE — 1101F PT FALLS ASSESS-DOCD LE1/YR: CPT | Performed by: NURSE PRACTITIONER

## 2018-08-28 PROCEDURE — 99203 OFFICE O/P NEW LOW 30 MIN: CPT | Performed by: NURSE PRACTITIONER

## 2018-08-28 ASSESSMENT — ENCOUNTER SYMPTOMS
RECTAL PAIN: 0
COUGH: 0
BACK PAIN: 0
VOMITING: 0
ANAL BLEEDING: 0
CONSTIPATION: 0
SHORTNESS OF BREATH: 0
NAUSEA: 1
ABDOMINAL DISTENTION: 0
SORE THROAT: 0
DIARRHEA: 1
VOICE CHANGE: 0
BLOOD IN STOOL: 0
TROUBLE SWALLOWING: 1
ABDOMINAL PAIN: 1

## 2018-08-28 NOTE — PROGRESS NOTES
CHF (congestive heart failure) (HCC)     Chronic kidney disease (CKD), stage V ([facility] 5/26/2017    Cigarette smoker 1/31/2015    COPD [facility]ch[facility] obstructive pulmonary disease) ([facility]     Diabetes mellitus ([facility] 1/31/2015    Hypercholesteremia 1/31/2015    Hypertension 1/31/2015    Pacemaker 1/31/2015    Peritoneal dialysis catheter dysfunction ([facility]     Sleep apnea     No MACHINE       Past Surgical History:   Procedure Laterality Date    CARDIAC CATHETERIZATION  2/2/15  JDT    stent to mid RCA.  EF 45%    CARPAL TUNNEL RELEASE Right     CHOLECYSTECTOMY, LAPAROSCOPIC N/A 12/8/2017    CHOLECYSTECTOMY LAPAROSCOPIC performed by Alexandra Andersen MD at [address] [place] [address]  2015    [place] [address] CORONARY ARTERY BYPASS GRAFT  2004    EGD  2015     [facility] Right     HERNIA REPAIR  9781?    umbilical hernia repair    KNEE ARTHROSCOPY Left     LAPAROSCOPY INSERTION PERITONEAL CATHETER N/A 5/26/2017    LAPAROSCOPIC INSERTION PERITONEAL DIALYSIS CATHETER WITH OMENTOPEXY performed by Shana Delgado MD at [address] [place] [address] N/A 7/17/2017    DIAGNOSTIC LAPAROSCOPY performed by Shana Delgado MD at [place] [address]      also pacemaker replacement       Social History     Social History    Marital status:      Spouse name: N/A    Number of children: N/A    Years of education: N/A     Social History Main Topics    Smoking status: Current Every Day Smoker     Packs/day: 0.50    Smokeless tobacco: Never Used    Alcohol use No    Drug use: No    Sexual activity: Not Asked     Other Topics Concern    None     Social History Narrative    None       No Known Allergies    Current Outpatient Prescriptions   Medication Sig Dispense Refill    B COMPLEX-C-FOLIC ACID ER PO Take 900 mg by mouth 2 times daily      bumetanide (BUMEX) 2 MG tablet Take 2 mg by mouth daily      calcitRIOL (ROCALTROL) 0.25 MCG capsule Take 0.25 mcg by mouth Psychiatric/Behavioral: Negative for dysphoric mood and sleep disturbance. The patient is not nervous/anxious. All other systems reviewed and are negative. Objective:     Physical Exam   Constitutional: He is oriented to person, place, and time. He appears well-developed and well-nourished. /70   Pulse 84   Ht 5' 6\" (1.676 m)   Wt 174 lb 3.2 oz (79 kg)   SpO2 95%   BMI 28.12 kg/m²    Eyes: Pupils are equal, round, and reactive to light. Conjunctivae and EOM are normal. No scleral icterus. Neck: Normal range of motion. Neck supple. No thyromegaly present. Cardiovascular: Normal rate, regular rhythm and normal heart sounds. Exam reveals no gallop and no friction rub. No murmur heard. Pulmonary/Chest: Effort normal and breath sounds normal. No respiratory distress. Abdominal: Soft. Bowel sounds are normal. He exhibits no distension. There is no tenderness. There is no rebound. Musculoskeletal: Normal range of motion. He exhibits no edema or deformity. Neurological: He is alert and oriented to person, place, and time. No cranial nerve deficit. Skin: No pallor. Psychiatric: He has a normal mood and affect. Judgment normal.   Nursing note and vitals reviewed. Assessment:       Diagnosis Orders   1. Family history of colon cancer  COLONOSCOPY W/ OR W/O BIOPSY   2. Diarrhea, unspecified type  COLONOSCOPY W/ OR W/O BIOPSY   3. Dark stools  ESOPHAGOSCOPY / EGD   4. Bilateral lower abdominal cramping  COLONOSCOPY W/ OR W/O BIOPSY   5. S/P cholecystectomy     6. Chronic kidney disease (CKD), stage V ([address]     7. Dysphagia, unspecified type  ESOPHAGOSCOPY / EGD   8. Chronic heartburn  ESOPHAGOSCOPY / EGD   9. Chronic nausea  ESOPHAGOSCOPY / EGD   10. Weight loss  COLONOSCOPY W/ OR W/O BIOPSY    ESOPHAGOSCOPY / EGD         Plan:      1. diatherix rectal swab, will call with results. If negative, will recommend he try metamucil daily or BID  2.  Schedule outpatient

## 2018-08-28 NOTE — PATIENT INSTRUCTIONS

## 2018-08-29 ENCOUNTER — TELEPHONE (OUTPATIENT)
Dept: GASTROENTEROLOGY | Age: 68
End: 2018-08-29

## 2018-11-12 ENCOUNTER — ANESTHESIA EVENT (OUTPATIENT)
Dept: ENDOSCOPY | Age: 68
End: 2018-11-12
Payer: COMMERCIAL

## 2018-11-13 ENCOUNTER — ANESTHESIA (OUTPATIENT)
Dept: ENDOSCOPY | Age: 68
End: 2018-11-13
Payer: COMMERCIAL

## 2018-11-13 ENCOUNTER — HOSPITAL ENCOUNTER (OUTPATIENT)
Age: 68
Setting detail: OUTPATIENT SURGERY
Discharge: HOME | End: 2018-11-13
Attending: INTERNAL MEDICINE | Admitting: INTERNAL MEDICINE
Payer: COMMERCIAL

## 2018-11-13 VITALS
HEART RATE: 60 BPM | WEIGHT: 172 LBS | HEIGHT: 66 IN | SYSTOLIC BLOOD PRESSURE: 122 MMHG | TEMPERATURE: 98.1 F | BODY MASS INDEX: 27.64 KG/M2 | RESPIRATION RATE: 18 BRPM | OXYGEN SATURATION: 95 % | DIASTOLIC BLOOD PRESSURE: 65 MMHG

## 2018-11-13 VITALS
OXYGEN SATURATION: 97 % | SYSTOLIC BLOOD PRESSURE: 163 MMHG | DIASTOLIC BLOOD PRESSURE: 79 MMHG | RESPIRATION RATE: 16 BRPM

## 2018-11-13 LAB
ANION GAP SERPL CALCULATED.3IONS-SCNC: 12 MMOL/L (ref 7–19)
BUN BLDV-MCNC: 31 MG/DL (ref 8–23)
CALCIUM SERPL-MCNC: 9.2 MG/DL (ref 8.8–10.2)
CHLORIDE BLD-SCNC: 106 MMOL/L (ref 98–111)
CO2: 28 MMOL/L (ref 22–29)
CREAT SERPL-MCNC: 5.5 MG/DL (ref 0.5–1.2)
GFR NON-AFRICAN AMERICAN: 10
GLUCOSE BLD-MCNC: 79 MG/DL (ref 74–109)
POTASSIUM SERPL-SCNC: 3.5 MMOL/L (ref 3.5–5)
SODIUM BLD-SCNC: 146 MMOL/L (ref 136–145)

## 2018-11-13 PROCEDURE — 88305 TISSUE EXAM BY PATHOLOGIST: CPT

## 2018-11-13 PROCEDURE — 45385 COLONOSCOPY W/LESION REMOVAL: CPT | Performed by: INTERNAL MEDICINE

## 2018-11-13 PROCEDURE — 43239 EGD BIOPSY SINGLE/MULTIPLE: CPT | Performed by: INTERNAL MEDICINE

## 2018-11-13 PROCEDURE — 45380 COLONOSCOPY AND BIOPSY: CPT | Performed by: INTERNAL MEDICINE

## 2018-11-13 PROCEDURE — 6360000002 HC RX W HCPCS: Performed by: NURSE ANESTHETIST, CERTIFIED REGISTERED

## 2018-11-13 PROCEDURE — 88312 SPECIAL STAINS GROUP 1: CPT

## 2018-11-13 PROCEDURE — 2500000003 HC RX 250 WO HCPCS: Performed by: NURSE ANESTHETIST, CERTIFIED REGISTERED

## 2018-11-13 PROCEDURE — 80048 BASIC METABOLIC PNL TOTAL CA: CPT

## 2018-11-13 PROCEDURE — 3700000000 HC ANESTHESIA ATTENDED CARE: Performed by: INTERNAL MEDICINE

## 2018-11-13 PROCEDURE — 7100000010 HC PHASE II RECOVERY - FIRST 15 MIN: Performed by: INTERNAL MEDICINE

## 2018-11-13 PROCEDURE — 3609027000 HC COLONOSCOPY: Performed by: INTERNAL MEDICINE

## 2018-11-13 PROCEDURE — 3609012400 HC EGD TRANSORAL BIOPSY SINGLE/MULTIPLE: Performed by: INTERNAL MEDICINE

## 2018-11-13 PROCEDURE — C1773 RET DEV, INSERTABLE: HCPCS | Performed by: INTERNAL MEDICINE

## 2018-11-13 PROCEDURE — 2580000003 HC RX 258: Performed by: INTERNAL MEDICINE

## 2018-11-13 PROCEDURE — 7100000011 HC PHASE II RECOVERY - ADDTL 15 MIN: Performed by: INTERNAL MEDICINE

## 2018-11-13 PROCEDURE — 2709999900 HC NON-CHARGEABLE SUPPLY: Performed by: INTERNAL MEDICINE

## 2018-11-13 PROCEDURE — 36415 COLL VENOUS BLD VENIPUNCTURE: CPT

## 2018-11-13 PROCEDURE — 3700000001 HC ADD 15 MINUTES (ANESTHESIA): Performed by: INTERNAL MEDICINE

## 2018-11-13 RX ORDER — LIDOCAINE HYDROCHLORIDE 10 MG/ML
1 INJECTION, SOLUTION EPIDURAL; INFILTRATION; INTRACAUDAL; PERINEURAL ONCE
Status: DISCONTINUED | OUTPATIENT
Start: 2018-11-13 | End: 2018-11-13 | Stop reason: HOSPADM

## 2018-11-13 RX ORDER — PROPOFOL 10 MG/ML
INJECTION, EMULSION INTRAVENOUS PRN
Status: DISCONTINUED | OUTPATIENT
Start: 2018-11-13 | End: 2018-11-13 | Stop reason: SDUPTHER

## 2018-11-13 RX ORDER — OMEPRAZOLE 40 MG/1
40 CAPSULE, DELAYED RELEASE ORAL DAILY
Qty: 30 CAPSULE | Refills: 3 | Status: SHIPPED | OUTPATIENT
Start: 2018-11-13 | End: 2018-11-15 | Stop reason: SDUPTHER

## 2018-11-13 RX ORDER — LEVOFLOXACIN 5 MG/ML
500 INJECTION, SOLUTION INTRAVENOUS
COMMUNITY
End: 2019-01-16

## 2018-11-13 RX ORDER — GENTAMICIN SULFATE 1 MG/G
CREAM TOPICAL DAILY
COMMUNITY

## 2018-11-13 RX ORDER — FLUCONAZOLE 100 MG/1
100 TABLET ORAL DAILY
COMMUNITY
End: 2019-01-16

## 2018-11-13 RX ORDER — LIDOCAINE HYDROCHLORIDE 10 MG/ML
INJECTION, SOLUTION INFILTRATION; PERINEURAL PRN
Status: DISCONTINUED | OUTPATIENT
Start: 2018-11-13 | End: 2018-11-13 | Stop reason: SDUPTHER

## 2018-11-13 RX ORDER — SODIUM CHLORIDE 450 MG/100ML
INJECTION, SOLUTION INTRAVENOUS CONTINUOUS
Status: DISCONTINUED | OUTPATIENT
Start: 2018-11-13 | End: 2018-11-13 | Stop reason: HOSPADM

## 2018-11-13 RX ADMIN — SODIUM CHLORIDE: 4.5 INJECTION, SOLUTION INTRAVENOUS at 10:47

## 2018-11-13 RX ADMIN — PROPOFOL 300 MG: 10 INJECTION, EMULSION INTRAVENOUS at 10:52

## 2018-11-13 RX ADMIN — LIDOCAINE HYDROCHLORIDE 40 MG: 10 INJECTION, SOLUTION INFILTRATION; PERINEURAL at 10:52

## 2018-11-13 ASSESSMENT — LIFESTYLE VARIABLES: SMOKING_STATUS: 1

## 2018-11-13 ASSESSMENT — PAIN - FUNCTIONAL ASSESSMENT: PAIN_FUNCTIONAL_ASSESSMENT: 0-10

## 2018-11-13 NOTE — OP NOTE
Endoscopic Procedure Note    Patient: Bree Quarles : 1950  [facility] Rec#: [id number] Acc#: [id number]     Primary Care Provider Patito Riggs MD  Referring Provider: Anastacia MARISCAL    Endoscopist: Rebecca Michel MD    Date of Procedure:  2018    Procedure:   1. EGD with biopsy    Indications:   1. dyspepsia  2. occ loose stools    Anesthesia:  Sedation was administered by anesthesia who monitored the patient during the procedure. Estimated Blood Loss: minimal    Procedure:   After reviewing the patient's chart and obtaining informed consent, the patient was placed in the left lateral decubitus position. A forward-viewing Olympus endoscope was lubricated and inserted through the mouth into the oropharynx. Under direct visualization, the upper esophagus was intubated. The scope was advanced to the level of the third portion of duodenum. Scope was slowly withdrawn with careful inspection of the mucosal surfaces. The scope was retroflexed for inspection of the gastric fundus and incisura. Findings and maneuvers are listed in impression below. The patient tolerated the procedure well. The scope was removed. There were no immediate complications. Findings:   Esophagus: normal  There is no hiatal hernia present. Stomach:  abnormal: in the antrum of the stomach, an approximately 1cm round lesion was noted. This was biopsied for histology. No obvious gastric ulcer was seen. Duodenum: abnormal: in the distal bulb of the duodenum, there was a large edematous area noted. This was questionable polyp vs mass - multiple biopsies obtained for histology. IMPRESSION:  1. S/p multiple gastric and duodenal biopsies as above       RECOMMENDATIONS:    1. Await path results, the patient will be contacted in 7-10 days with biopsy results. 2.  Continue PPI   3. Pending pathology, patient may warrant an EUS or referral to tertiary center to evaluate the duodenal lesion completely.      The results were

## 2018-11-13 NOTE — OP NOTE
Patient: Tae Brito : 1950  Med Rec#: [id number] Acc#: [id number]   Primary Care Provider Mily Briggs MD    Date of Procedure:  2018    Endoscopist: Felipe Harris MD    Referring Provider: Mily Briggs MD,     Operation Performed: Colonoscopy with snare polypectomy  Colonoscopy with biopsy    Indications: diarrhea, loose stools    Anesthesia:  Sedation was administered by anesthesia who monitored the patient during the procedure. I met with Tae Brito prior to procedure. We discussed the procedure itself, and I have discussed the risks of endoscopy (including-- but not limited to-- pain, discomfort, bleeding potentially requiring second endoscopic procedure and/or blood transfusion, organ perforation requiring operative repair, damage to organs near the colon, infection, aspiration, cardiopulmonary/allergic reaction), benefits, indications to endoscopy. Additionally, we discussed options other than colonoscopy. The patient expressed understanding. All questions answered. The patient decided to proceed with the procedure. Signed informed consent was placed on the chart. Blood Loss: minimal    Withdrawal time: n/a  Bowel Prep: adequate     Complications: no immediate complications    DESCRIPTION OF PROCEDURE:     A time out was performed. After written informed consent was obtained, the patient was placed in the left lateral position. The perianal area was inspected, and a digital rectal exam was performed. A rectal exam was performed: normal tone, no palpable lesions. At this point, a forward viewing Olympus colonoscope was inserted into the anus and carefully advanced to the cecum. The cecum was identified by the ileocecal valve and the appendiceal orifice. The colonoscope was then slowly withdrawn with careful inspection of the mucosa in a linear and circumferential fashion. The scope was retroflexed in the rectum.  Suction was utilized during the procedure to remove as much air as possible from the bowel. The colonoscope was removed from the patient, and the procedure was terminated. Findings are listed below. Findings: The mucosa appeared normal throughout the entire examined colon   Retroflexion in the rectum was normal and revealed no further abnormalities     Sigmoid colon polyp measuring 7mm was seen and removed with cold snare polypectomy  Transverse colon polyp measuring 4mm removed with cold forceps polypectom  A small diminutive polyp at 30cm removed with cold forceps polypectomy  Given history of diarrhea, multiple colon biopsies were obtained for histology    Recommendations:  1. Repeat colonoscopy: pending pathology - max of 3 yrs for screening  2. Await biopsy results-you will receive a letter with your results within 7-10 days    Findings and recommendations were discussed w/ the patient. A copy of the images was provided.     Maura Dodge MD  11/13/2018  11:19 AM

## 2018-11-13 NOTE — ANESTHESIA POSTPROCEDURE EVALUATION
Department of Anesthesiology  Postprocedure Note    Patient: Viola Ledesma  MRN: [id number]  [place]: 1950  Date of evaluation: 11/13/2018  Time:  11:26 AM     Procedure Summary     Date:  11/13/18 Room / Location:  [place] [address] / [facility]    Anesthesia Start:  9674 Anesthesia Stop:      Procedures:       COLONOSCOPY (N/A )      EGD BIOPSY (N/A Abdomen) Diagnosis:  (DIARRHEA - DYSPHAGIA)    Surgeon:  Lonnie Nguyen MD Responsible Provider:  LOIDA Navarro CRNA    Anesthesia Type:  general ASA Status:  4          Anesthesia Type: general    Madhuri Phase I: Madhuri Score: 10    Madhuri Phase II:      Last vitals: Reviewed and per EMR flowsheets.        Anesthesia Post Evaluation    Patient location during evaluation: bedside  Patient participation: complete - patient participated  Level of consciousness: sleepy but conscious  Pain score: 0  Airway patency: patent  Nausea & Vomiting: no nausea and no vomiting  Complications: no  Cardiovascular status: hemodynamically stable and blood pressure returned to baseline  Respiratory status: acceptable and nasal cannula  Hydration status: stable

## 2018-11-15 DIAGNOSIS — K29.80 DUODENITIS: Primary | ICD-10-CM

## 2018-11-15 RX ORDER — OMEPRAZOLE 40 MG/1
40 CAPSULE, DELAYED RELEASE ORAL
Qty: 60 CAPSULE | Refills: 5 | Status: SHIPPED | OUTPATIENT
Start: 2018-11-15 | End: 2019-04-18 | Stop reason: SDUPTHER

## 2018-11-16 ENCOUNTER — TELEPHONE (OUTPATIENT)
Dept: GASTROENTEROLOGY | Age: 68
End: 2018-11-16

## 2019-01-14 ENCOUNTER — CLINICAL SUPPORT (OUTPATIENT)
Dept: CARDIOLOGY | Facility: CLINIC | Age: 69
End: 2019-01-14

## 2019-01-14 DIAGNOSIS — I49.5 SINUS NODE DYSFUNCTION (HCC): ICD-10-CM

## 2019-01-14 DIAGNOSIS — Z95.0 PACEMAKER: ICD-10-CM

## 2019-01-14 PROCEDURE — 93288 INTERROG EVL PM/LDLS PM IP: CPT | Performed by: NURSE PRACTITIONER

## 2019-01-14 NOTE — PROGRESS NOTES
Pacemaker Evaluation Report    January 14, 2019    Indication for pacemaker: sinus node dysfunction - tachy/yesi    Implanting MD: Dr. Howe    : Medtronic  Model: Adapta [id number]    Implant Date: 11/21/14    Reason For Evaluation: routine      DIAGNOSTIC DATA    Atrial paced: 63.1%  Ventricular paced: 99.3%    Battery status: satisfactory  Voltage: 2.78 V  Estimated battery life: 10 years    6 AHR episodes, 2 VHR episodes, 2 days with greater than 4 hours per day of atrial arrhythmia Oct & Nov 2018. Asymptomatic. Not anticoagulated. 0.4 % afib burden. Has been on warfarin in the past - doesn't know why discontinued.  He will check with wife to see why off warfarin. May need to restart anticoagulation - arline if burden increases.     FINAL PARAMETERS    Changes made: none    Conclusions: normal pacemaker function, stable pacing and sensing thresholds and adequate battery reserve    Return in about 6 months (around 7/14/2019) for Pacemaker Clinic (Medtronic).

## 2019-01-15 ENCOUNTER — TELEPHONE (OUTPATIENT)
Dept: GASTROENTEROLOGY | Age: 69
End: 2019-01-15

## 2019-01-16 ENCOUNTER — OFFICE VISIT (OUTPATIENT)
Dept: GASTROENTEROLOGY | Age: 69
End: 2019-01-16
Payer: COMMERCIAL

## 2019-01-16 VITALS
BODY MASS INDEX: 29.25 KG/M2 | HEIGHT: 66 IN | DIASTOLIC BLOOD PRESSURE: 80 MMHG | OXYGEN SATURATION: 95 % | SYSTOLIC BLOOD PRESSURE: 130 MMHG | WEIGHT: 182 LBS | HEART RATE: 89 BPM

## 2019-01-16 DIAGNOSIS — Z98.890 S/P COLONOSCOPIC POLYPECTOMY: ICD-10-CM

## 2019-01-16 DIAGNOSIS — K31.89 DUODENAL MASS: Primary | ICD-10-CM

## 2019-01-16 DIAGNOSIS — Z86.010 PERSONAL HISTORY OF COLONIC POLYPS: ICD-10-CM

## 2019-01-16 DIAGNOSIS — Z98.890 S/P ENDOSCOPY: ICD-10-CM

## 2019-01-16 PROCEDURE — 1123F ACP DISCUSS/DSCN MKR DOCD: CPT | Performed by: NURSE PRACTITIONER

## 2019-01-16 PROCEDURE — 1036F TOBACCO NON-USER: CPT | Performed by: NURSE PRACTITIONER

## 2019-01-16 PROCEDURE — G8417 CALC BMI ABV UP PARAM F/U: HCPCS | Performed by: NURSE PRACTITIONER

## 2019-01-16 PROCEDURE — 99214 OFFICE O/P EST MOD 30 MIN: CPT | Performed by: NURSE PRACTITIONER

## 2019-01-16 PROCEDURE — G8598 ASA/ANTIPLAT THER USED: HCPCS | Performed by: NURSE PRACTITIONER

## 2019-01-16 PROCEDURE — 1101F PT FALLS ASSESS-DOCD LE1/YR: CPT | Performed by: NURSE PRACTITIONER

## 2019-01-16 PROCEDURE — 3017F COLORECTAL CA SCREEN DOC REV: CPT | Performed by: NURSE PRACTITIONER

## 2019-01-16 PROCEDURE — 4040F PNEUMOC VAC/ADMIN/RCVD: CPT | Performed by: NURSE PRACTITIONER

## 2019-01-16 PROCEDURE — G8427 DOCREV CUR MEDS BY ELIG CLIN: HCPCS | Performed by: NURSE PRACTITIONER

## 2019-01-16 PROCEDURE — G8484 FLU IMMUNIZE NO ADMIN: HCPCS | Performed by: NURSE PRACTITIONER

## 2019-01-16 ASSESSMENT — ENCOUNTER SYMPTOMS
SHORTNESS OF BREATH: 0
SORE THROAT: 0
BLOOD IN STOOL: 0
TROUBLE SWALLOWING: 0
ABDOMINAL DISTENTION: 0
VOMITING: 0
RECTAL PAIN: 0
VOICE CHANGE: 0
NAUSEA: 0
CONSTIPATION: 0
COUGH: 0
BACK PAIN: 0
DIARRHEA: 0
ANAL BLEEDING: 0
ABDOMINAL PAIN: 0

## 2019-02-15 ENCOUNTER — ANESTHESIA (OUTPATIENT)
Dept: ENDOSCOPY | Age: 69
End: 2019-02-15
Payer: COMMERCIAL

## 2019-02-15 ENCOUNTER — ANESTHESIA EVENT (OUTPATIENT)
Dept: ENDOSCOPY | Age: 69
End: 2019-02-15
Payer: COMMERCIAL

## 2019-02-15 ENCOUNTER — HOSPITAL ENCOUNTER (OUTPATIENT)
Age: 69
Setting detail: OUTPATIENT SURGERY
Discharge: HOME | End: 2019-02-15
Attending: INTERNAL MEDICINE | Admitting: INTERNAL MEDICINE
Payer: COMMERCIAL

## 2019-02-15 VITALS
RESPIRATION RATE: 22 BRPM | OXYGEN SATURATION: 91 % | SYSTOLIC BLOOD PRESSURE: 127 MMHG | DIASTOLIC BLOOD PRESSURE: 89 MMHG

## 2019-02-15 VITALS
HEART RATE: 69 BPM | WEIGHT: 178 LBS | DIASTOLIC BLOOD PRESSURE: 61 MMHG | BODY MASS INDEX: 28.61 KG/M2 | SYSTOLIC BLOOD PRESSURE: 125 MMHG | HEIGHT: 66 IN | TEMPERATURE: 98 F | RESPIRATION RATE: 20 BRPM | OXYGEN SATURATION: 94 %

## 2019-02-15 LAB
BASE EXCESS VENOUS: 5
CALCIUM IONIZED: 1.32 MMOL/L (ref 1.1–1.3)
CO2: 31 MEQ/L (ref 21–32)
GFR NON-AFRICAN AMERICAN: 10
GLUCOSE BLD-MCNC: 86 MG/DL (ref 70–99)
GLUCOSE BLD-MCNC: 87 MG/DL (ref 70–99)
HEMOGLOBIN: 11.4 GM/DL (ref 12–18)
O2 SAT, VEN: 65 %
PCO2, VEN: 51.3 MM HG (ref 40–50)
PERFORMED ON: ABNORMAL
PERFORMED ON: NORMAL
PH VENOUS: 7.38
PO2, VEN: 34.6 MM HG
POC ANION GAP: 7
POC CHLORIDE: 109 MEQ/L (ref 99–110)
POC CREATININE: 5.9 MG/DL (ref 0.3–1.3)
POC HEMATOCRIT: 33 % (ref 37–52)
POC POTASSIUM: 3.5 MEQ/L (ref 3.5–5.1)
POC SAMPLE TYPE: ABNORMAL
POC SODIUM: 147 MEQ/L (ref 136–145)
TCO2 CALC VENOUS: 32 MMOL/L

## 2019-02-15 PROCEDURE — 2580000003 HC RX 258: Performed by: INTERNAL MEDICINE

## 2019-02-15 PROCEDURE — 85014 HEMATOCRIT: CPT

## 2019-02-15 PROCEDURE — 84295 ASSAY OF SERUM SODIUM: CPT

## 2019-02-15 PROCEDURE — 3609012400 HC EGD TRANSORAL BIOPSY SINGLE/MULTIPLE: Performed by: INTERNAL MEDICINE

## 2019-02-15 PROCEDURE — 82565 ASSAY OF CREATININE: CPT

## 2019-02-15 PROCEDURE — 3700000001 HC ADD 15 MINUTES (ANESTHESIA): Performed by: INTERNAL MEDICINE

## 2019-02-15 PROCEDURE — 82948 REAGENT STRIP/BLOOD GLUCOSE: CPT

## 2019-02-15 PROCEDURE — 6360000002 HC RX W HCPCS: Performed by: NURSE ANESTHETIST, CERTIFIED REGISTERED

## 2019-02-15 PROCEDURE — 82435 ASSAY OF BLOOD CHLORIDE: CPT

## 2019-02-15 PROCEDURE — 84132 ASSAY OF SERUM POTASSIUM: CPT

## 2019-02-15 PROCEDURE — 7100000011 HC PHASE II RECOVERY - ADDTL 15 MIN: Performed by: INTERNAL MEDICINE

## 2019-02-15 PROCEDURE — 88305 TISSUE EXAM BY PATHOLOGIST: CPT

## 2019-02-15 PROCEDURE — 82800 BLOOD PH: CPT

## 2019-02-15 PROCEDURE — 2500000003 HC RX 250 WO HCPCS: Performed by: NURSE ANESTHETIST, CERTIFIED REGISTERED

## 2019-02-15 PROCEDURE — 82330 ASSAY OF CALCIUM: CPT

## 2019-02-15 PROCEDURE — 2709999900 HC NON-CHARGEABLE SUPPLY: Performed by: INTERNAL MEDICINE

## 2019-02-15 PROCEDURE — 43239 EGD BIOPSY SINGLE/MULTIPLE: CPT | Performed by: INTERNAL MEDICINE

## 2019-02-15 PROCEDURE — 3700000000 HC ANESTHESIA ATTENDED CARE: Performed by: INTERNAL MEDICINE

## 2019-02-15 PROCEDURE — 82374 ASSAY BLOOD CARBON DIOXIDE: CPT

## 2019-02-15 PROCEDURE — 2500000003 HC RX 250 WO HCPCS: Performed by: INTERNAL MEDICINE

## 2019-02-15 PROCEDURE — 82810 BLOOD GASES O2 SAT ONLY: CPT

## 2019-02-15 PROCEDURE — 7100000010 HC PHASE II RECOVERY - FIRST 15 MIN: Performed by: INTERNAL MEDICINE

## 2019-02-15 RX ORDER — SODIUM CHLORIDE, SODIUM LACTATE, POTASSIUM CHLORIDE, CALCIUM CHLORIDE 600; 310; 30; 20 MG/100ML; MG/100ML; MG/100ML; MG/100ML
INJECTION, SOLUTION INTRAVENOUS CONTINUOUS
Status: DISCONTINUED | OUTPATIENT
Start: 2019-02-15 | End: 2019-02-15

## 2019-02-15 RX ORDER — ACETAMINOPHEN 500 MG
500 TABLET ORAL EVERY 6 HOURS PRN
COMMUNITY

## 2019-02-15 RX ORDER — SODIUM CHLORIDE 450 MG/100ML
INJECTION, SOLUTION INTRAVENOUS CONTINUOUS
Status: DISCONTINUED | OUTPATIENT
Start: 2019-02-15 | End: 2019-02-15 | Stop reason: HOSPADM

## 2019-02-15 RX ORDER — PROPOFOL 10 MG/ML
INJECTION, EMULSION INTRAVENOUS PRN
Status: DISCONTINUED | OUTPATIENT
Start: 2019-02-15 | End: 2019-02-15 | Stop reason: SDUPTHER

## 2019-02-15 RX ORDER — LIDOCAINE HYDROCHLORIDE 10 MG/ML
1 INJECTION, SOLUTION EPIDURAL; INFILTRATION; INTRACAUDAL; PERINEURAL ONCE
Status: COMPLETED | OUTPATIENT
Start: 2019-02-15 | End: 2019-02-15

## 2019-02-15 RX ORDER — LIDOCAINE HYDROCHLORIDE 20 MG/ML
INJECTION, SOLUTION INFILTRATION; PERINEURAL PRN
Status: DISCONTINUED | OUTPATIENT
Start: 2019-02-15 | End: 2019-02-15 | Stop reason: SDUPTHER

## 2019-02-15 RX ADMIN — LIDOCAINE HYDROCHLORIDE 1 ML: 10 INJECTION, SOLUTION EPIDURAL; INFILTRATION; INTRACAUDAL; PERINEURAL at 12:02

## 2019-02-15 RX ADMIN — LIDOCAINE HYDROCHLORIDE 40 MG: 20 INJECTION, SOLUTION INFILTRATION; PERINEURAL at 12:41

## 2019-02-15 RX ADMIN — SODIUM CHLORIDE: 4.5 INJECTION, SOLUTION INTRAVENOUS at 12:01

## 2019-02-15 RX ADMIN — PROPOFOL 150 MG: 10 INJECTION, EMULSION INTRAVENOUS at 12:41

## 2019-02-15 ASSESSMENT — PAIN SCALES - GENERAL: PAINLEVEL_OUTOF10: 0

## 2019-02-15 ASSESSMENT — PAIN - FUNCTIONAL ASSESSMENT: PAIN_FUNCTIONAL_ASSESSMENT: 0-10

## 2019-04-16 DIAGNOSIS — K29.80 DUODENITIS: ICD-10-CM

## 2019-04-16 NOTE — TELEPHONE ENCOUNTER
I called and discussed with patient's wife. He has been on Omeprazole for 2 years. Dialysis is not new for the patient. Patient is still taking Omeprazole 40 mg bid and the ranitidine if needed. I have called and requested the patient's most recent CMP from [facility] as patient's wife stated that he has the labs drawn there monthly. Patient will not be out of refills for another week. She understands that I will discuss with Dr Mauricio Hurley on Thursday and advise from there.

## 2019-04-16 NOTE — TELEPHONE ENCOUNTER
Last OV with [facility] aprn on 1-16-19. Egd was completed on 2-15-19. No FU scheduled. Dialysis patient per the wife Ethel Prom [phone].     In view of the current kidney issues I will send RF request for Omeprazole 40 mg Bid to  for approval. [facility]

## 2019-04-18 RX ORDER — OMEPRAZOLE 40 MG/1
40 CAPSULE, DELAYED RELEASE ORAL DAILY
Qty: 30 CAPSULE | Refills: 5 | Status: SHIPPED | OUTPATIENT
Start: 2019-04-18 | End: 2019-10-08 | Stop reason: SDUPTHER

## 2019-07-22 VITALS
SYSTOLIC BLOOD PRESSURE: 128 MMHG | DIASTOLIC BLOOD PRESSURE: 70 MMHG | HEART RATE: 60 BPM | HEIGHT: 66 IN | WEIGHT: 183 LBS | BODY MASS INDEX: 29.41 KG/M2

## 2019-09-30 ENCOUNTER — OFFICE VISIT (OUTPATIENT)
Dept: CARDIOLOGY | Facility: CLINIC | Age: 69
End: 2019-09-30

## 2019-09-30 VITALS
HEIGHT: 66 IN | HEART RATE: 76 BPM | SYSTOLIC BLOOD PRESSURE: 114 MMHG | OXYGEN SATURATION: 95 % | DIASTOLIC BLOOD PRESSURE: 70 MMHG | WEIGHT: 179.4 LBS | BODY MASS INDEX: 28.83 KG/M2

## 2019-09-30 DIAGNOSIS — I10 ESSENTIAL HYPERTENSION: Chronic | ICD-10-CM

## 2019-09-30 DIAGNOSIS — I50.32 CHRONIC DIASTOLIC HEART FAILURE (HCC): Chronic | ICD-10-CM

## 2019-09-30 DIAGNOSIS — Z95.0 PACEMAKER: ICD-10-CM

## 2019-09-30 DIAGNOSIS — Z95.1 S/P CABG X 3: Chronic | ICD-10-CM

## 2019-09-30 DIAGNOSIS — R01.1 HEART MURMUR: ICD-10-CM

## 2019-09-30 DIAGNOSIS — N18.5 CHRONIC KIDNEY DISEASE, STAGE V (HCC): Chronic | ICD-10-CM

## 2019-09-30 DIAGNOSIS — I25.10 CORONARY ARTERY DISEASE INVOLVING NATIVE CORONARY ARTERY OF NATIVE HEART WITHOUT ANGINA PECTORIS: Primary | Chronic | ICD-10-CM

## 2019-09-30 DIAGNOSIS — E78.2 MIXED DYSLIPIDEMIA: Chronic | ICD-10-CM

## 2019-09-30 DIAGNOSIS — D69.6 THROMBOCYTOPENIA (HCC): ICD-10-CM

## 2019-09-30 DIAGNOSIS — F17.211 CIGARETTE NICOTINE DEPENDENCE IN REMISSION: ICD-10-CM

## 2019-09-30 PROCEDURE — 99214 OFFICE O/P EST MOD 30 MIN: CPT | Performed by: NURSE PRACTITIONER

## 2019-09-30 PROCEDURE — 93000 ELECTROCARDIOGRAM COMPLETE: CPT | Performed by: NURSE PRACTITIONER

## 2019-09-30 RX ORDER — SENNOSIDES 8.6 MG
650 CAPSULE ORAL EVERY 8 HOURS PRN
COMMUNITY

## 2019-09-30 RX ORDER — OMEPRAZOLE 40 MG/1
40 CAPSULE, DELAYED RELEASE ORAL DAILY
COMMUNITY

## 2019-09-30 NOTE — ASSESSMENT & PLAN NOTE
Stable. Scheduled for cardiac catheterization tomorrow at [place], [place] for pre-kidney transplant evaluation.

## 2019-09-30 NOTE — PROGRESS NOTES
"    Subjective:     Encounter Date:09/30/2019    Chief Complaint:    Patient ID: Blaise Vanessa is a 69 y.o. male here today for yearly cardiac follow-up. He is accompanied by his wife Eda. They do not know the name of the cardiologist doing his heart cath in [place] tomorrow.    HPI     Coronary Artery Disease      Additional comments: no chest discomfort, s/p CABG 2004, last stress test nuclear 2016 with inferolateral infarct and yahaira-infarct ischemia. Scheduled to have a heart cath in the morning in [place] to see if eligible to have kidney transplant.              Congestive Heart Failure      Additional comments: chronic diastolic, a little swelling in legs that improves after PD, some PND but no orthopnea, very little snoring noticed by wife since weight loss, rarely wears oxygen at night if feels stuffy              Hypertension      Additional comments: \"good\", runs 110/70s - goes up to 130/70s in the evening              Hyperlipidemia      Additional comments: reports \"good\" per recent labs at Dr. Horton office          Last edited by Cindy Chavarria APRN on 9/30/2019  9:48 AM. (History)        History:   Past Medical History:   Diagnosis Date   • Arthritis    • CAD (coronary artery disease)    • Cataract    • CHF (congestive heart failure) (CMS/HCC)    • Chronic renal disease, stage IV (CMS/HCC)    • Diabetes mellitus (CMS/HCC)    • Edema    • Hyperlipidemia    • Hyperparathyroidism (CMS/HCC)    • Hypertension    • Hyperuricemia    • Peritoneal dialysis catheter in place (CMS/HCC)    • Thrombocytopenia (CMS/HCC)    • Vitamin D deficiency      Past Surgical History:   Procedure Laterality Date   • CARDIAC PACEMAKER PLACEMENT     • CATARACT EXTRACTION, BILATERAL     • CORONARY ARTERY BYPASS GRAFT     • HERNIA REPAIR     • KNEE SURGERY     • WRIST SURGERY       Social History     Socioeconomic History   • Marital status:      Spouse name: Not on file   • Number of children: Not on file "   • Years of education: Not on file   • Highest education level: Not on file   Occupational History   • Occupation: Disabled   Tobacco Use   • Smoking status: Former Smoker     Packs/day: 2.00     Years: 50.00     Pack years: 100.00     Types: Cigarettes     Start date:      Last attempt to quit: 10/27/2018     Years since quittin.9   • Smokeless tobacco: Never Used   • Tobacco comment: quit cold turkey, up to 2 ppd, has quit for a year in the past on his own   Substance and Sexual Activity   • Alcohol use: Yes     Comment: previously drank beer and whiskey but not in excess   • Drug use: No   • Sexual activity: Defer     Family History   Problem Relation Age of Onset   • Heart disease Mother    • Heart attack Mother    • Heart disease Father    • Heart attack Father        Outpatient Medications Marked as Taking for the 19 encounter (Office Visit) with Cindy Chavarria APRN   Medication Sig Dispense Refill   • acetaminophen (TYLENOL) 650 MG 8 hr tablet Take 650 mg by mouth Every 8 (Eight) Hours As Needed for Mild Pain .     • ADVAIR DISKUS 250-50 MCG/DOSE DISKUS Inhale 2 puffs As Needed.     • allopurinol (ZYLOPRIM) 100 MG tablet 100 mg 2 (Two) Times a Day.  5   • amLODIPine (NORVASC) 10 MG tablet Take 5 mg by mouth 2 (Two) Times a Day. 1/2 tab bid      • aspirin 81 MG tablet Take 81 mg by mouth Daily.     • atorvastatin (LIPITOR) 40 MG tablet Take 40 mg by mouth Every Night.     • B Complex-C-Folic Acid (DIALYVITE 800 PO) Take 800 mg by mouth 2 (Two) Times a Day.     • bumetanide (BUMEX) 2 MG tablet Take 2 mg by mouth Daily.     • calcitriol (ROCALTROL) 0.25 MCG capsule Take 0.25 mcg by mouth Daily.     • carvedilol (COREG) 12.5 MG tablet 12.5 mg 2 (Two) Times a Day With Meals.     • gentamicin (GARAMYCIN) 0.1 % cream Apply 0.1 application topically Daily.  3   • JANUVIA 50 MG tablet Take 50 mg by mouth Daily.  2   • minoxidil (LONITEN) 2.5 MG tablet Take 2.5-5 mg by mouth 2 (Two) Times a Day.  "2.5 mg am, 5 mg pm     • omeprazole (priLOSEC) 40 MG capsule Take 40 mg by mouth Daily.     • ONE TOUCH ULTRA TEST test strip USE TO TEST BLOOD SUGAR DAILY  0   • POTASSIUM CHLORIDE ER PO Take 25 mEq by mouth Daily.     • raNITIdine (ZANTAC) 300 MG tablet Take 300 mg by mouth Daily.  5   • vitamin D (ERGOCALCIFEROL) 12062 UNITS capsule capsule Take 50,000 Units by mouth Every 7 (Seven) Days.  1       Review of Systems:  Review of Systems   Constitution: Positive for malaise/fatigue (about the same ). Negative for chills and fever.   HENT: Negative for congestion and nosebleeds.    Eyes: Negative for blurred vision and double vision.   Cardiovascular: Positive for dyspnea on exertion and leg swelling. Negative for chest pain, irregular heartbeat, near-syncope, palpitations and syncope.   Respiratory: Negative for cough and shortness of breath.    Endocrine: Negative for cold intolerance and heat intolerance.   Hematologic/Lymphatic: Bruises/bleeds easily.   Skin: Negative for dry skin and rash.   Musculoskeletal: Positive for joint pain. Negative for arthritis and back pain.   Gastrointestinal: Negative for abdominal pain, constipation and diarrhea.   Genitourinary: Positive for nocturia (twice).   Neurological: Positive for excessive daytime sleepiness. Negative for dizziness, headaches, light-headedness and loss of balance.   Psychiatric/Behavioral: Negative for depression. The patient does not have insomnia and is not nervous/anxious.           Objective:   /70 (BP Location: Left arm, Patient Position: Sitting, Cuff Size: Adult)   Pulse 76   Ht 167.6 cm (66\")   Wt 81.4 kg (179 lb 6.4 oz)   SpO2 95%   BMI 28.96 kg/m²   Wt Readings from Last 3 Encounters:   09/30/19 81.4 kg (179 lb 6.4 oz)   07/16/18 79.4 kg (175 lb)   07/14/17 87.1 kg (192 lb)       Physical Exam   Constitutional: He is oriented to person, place, and time. He appears well-developed and well-nourished.   Eyes: No scleral icterus.   Neck: " No JVD present. Carotid bruit is present (faint, bilateral).   thick   Cardiovascular: Normal rate, regular rhythm and intact distal pulses. Exam reveals no gallop and no friction rub.   Murmur heard.   Crescendo-decrescendo midsystolic murmur is present with a grade of 1/6.  Pulmonary/Chest: Effort normal and breath sounds normal.   Musculoskeletal: He exhibits edema (1+ pitting distal BLEs).   Neurological: He is alert and oriented to person, place, and time.   Skin: Skin is warm and dry.   Psychiatric: He has a normal mood and affect.   Vitals reviewed.    Lab/Diagnostics Review:   8/5/2019  CBC WBC 3620 hemoglobin 10.4 hematocrit 32.1% platelets 109,000  CMP sodium 145 potassium 3.6 chloride 108 CO2 26 BUN 43 creatinine 5.08 calcium 8.9 glucose 98 total protein 5.0 albumin 3.1    7/10/2018  CBC WBC 5050, hemoglobin 12 point 8, hematocrit 37.8%, platelets 93,000  CMP sodium 145, potassium 3.8,  chloride 108, CO2 28 , calcium 8.9, glucose 85 , total protein 5.1, albumin 3.2 , magnesium 1.8 , alkaline phosphatase 75      Iron 42, TIBC 255, transferrin saturation 16%     05/30/2017   CBC WBC 4460, hemoglobin 11.7, hematocrit 34.7%, platelets 91,000  Chemistry sodium 145, potassium 4, chloride 111, BUN 42, creatinine 4.61  Magnesium 2.5  Iron 33, TIBC 245, transferrin saturation 13%  Hemoglobin A1c 5.9%, folate 9.2, vitamin B12 358, vitamin D 25 OH 40.5     05/01/2017   CBC WBC 5600, 1113, hematocrit 40.4%, platelets 90,000  Renal panel  sodium 149, potassium 4, chloride 111, CO2 27.4, BUN 51, creatinine 4.87, albumin 3.5, calcium 11.7, glucose 102, phosphorus 4.5, uric acid 5     12/13/2016 lipid panel total cholesterol 128, triglycerides 126, HDL 32, LDL 71     Echocardiogram:  · 10/7/2014 Echocardiogram. [facility]. Normal LV systolic function, LVEF 60%. No regional wall motion abnormalities. Mild to moderate LVH (septal wall thickness 1.6, posterior wall thickness 1.4).Grade 2 diastolic dysfunction. Normal RV  size and systolic function. Mild left atrial dilation. Aortic valve sclerosis without stenosis. Mitral annular calcification. Trace MR. Mild TR. Mild PI. Estimated RVSP 33 mmHg.  · 12/5/2016 Echocardiogram. [facility]. Normal LV Systolic Function, LVEF 55-60% grade 1 diastolic dysfunction. Normal RV size and systolic function. Mild left atrial dilation. Mild aortic valve sclerosis. Mild mitral calcification. Mild MR.     2/5/2016 Lexiscan Nuclear Stress Test. [facility]. Inferior lateral infarct with yahaira-infarct ischemia. LVEF 55%.     10/4/2016 EKG. Atrial fibrillation with ventricular pacing, rate 60 BPM.     12/13/2016 Pulmonary Function Studies. [facility]. FEV1 1.62 (62%), FVC 2.35 (73%), FEV1/FVC 69%, FEF 25-75% 0.98 (36%). TLC 4.48 (83%), vital capacity 2.47 (76%), residual volume 2.01 (96%). DLCO 12.1 (55%), DLCO/VA 4.33 (114%).     12/13/2016 Arterial Blood Gas. [facility]. PH 7.31, PCO2 45, PO2 55, bicarbonate 22.7, base excess -3.7, oxygen saturation 88.5%.     12/5/2016. PA and Lateral Chest X-Ray. [facility]. Left basilar atelectasis, pneumonitis, and/or pulmonary parenchymal scarring which is slightly improved compared to prior examination dated 10/8/2016. Sternotomy wires noted. Presence of dual chamber pacemaker     01/31/2015 cardiac catheterization 2.5x33 mm Xience Alpine drug-eluting stent to mid right coronary artery.  Patent LIMA to LAD with native LAD mid 80% stenosis.  Diffuse luminal irregularities left circumflex mid marginal branch.  Right coronary artery % stenosis with a long 90% mid right coronary artery stenosis prior to the PDA.  CARMELO widely patent nongrafted suitable for bypass grafting in the future if needed.  Occluded saphenous vein graft to the obtuse marginal.  Occluded saphenous vein graft to the PDA.  Inferior basilar akinesis with EF 45%. Keli.        ECG 12 Lead  Date/Time: 7/16/2018 9:54 AM  Performed by: ADAL MCKENNA  Authorized by: ADAL MCKENNA   Comparison: compared with  previous ECG from 10/4/2016  Comparison to previous ECG: PVC is new  Rhythm: paced  Ectopy: infrequent PVCs  Rate: normal  BPM: 65  Pacin% capture  Clinical impression: abnormal ECG          ECG 12 Lead  Date/Time: 2019 9:56 AM  Performed by: Cindy Chavarria APRN  Authorized by: Cindy Chavarria APRN   Comparison: compared with previous ECG from 2018  Comparison to previous ECG: PVCs are no longer present  Rhythm: paced  Rate: normal  BPM: 61  Pacin% capture and dual chamber pacingClinical impression comment: paced ECG                  Assessment/Plan:         Problem List Items Addressed This Visit        Cardiovascular and Mediastinum    Chronic diastolic heart failure (CMS/HCC) (Chronic)    Current Assessment & Plan     Class II, Stage C. Compensated. Encouraged use of compression to BLEs to help with edema. Continue present therapy.           CAD (coronary artery disease) - Primary (Chronic)    Overview     ·  Myocardial infarction  · 2015 Cardiac catheterization  · 2015 PCI with Xience drug eluting stent placement to mid RCA 90% stenosis. Known occluded PDA and occluded SVG to PDA.         Current Assessment & Plan     Stable. Scheduled for cardiac catheterization tomorrow at [place], [place] for pre-kidney transplant evaluation.         Relevant Orders    ECG 12 Lead    Hypertension (Chronic)    Current Assessment & Plan     Well controlled with medications. Continue present therapy.         Pacemaker    Overview     Medtronic Adapta [id number] implanted 2014 by Dr. Howe, [place], [place]         Current Assessment & Plan     Unable to do remote interrogations. Scheduled for in office interrogation next month.         Heart murmur    Current Assessment & Plan     Check echocardiogram.          Relevant Orders    Adult Transthoracic Echo Complete W/ Cont if Necessary Per Protocol       Genitourinary    Chronic kidney disease, stage V (CMS/HCC) (Chronic)        Hematopoietic and Hemostatic    Thrombocytopenia (CMS/HCC)       Other    S/P CABG x 3 (Chronic)    Overview     · Rubens Medrano.  2004         Mixed dyslipidemia (Chronic)    Overview     · 12/13/2016 Total cholesterol 128, triglyceride 126, HDL 32, LDL 71         Current Assessment & Plan     Requested and will review most recent lipids. Continue healthy diet and atorvastatin. Encouraged routine aerobic activity as he can tolerate.         Cigarette nicotine dependence in remission    Current Assessment & Plan     Encouraged continued smoking cessation.             Return in about 1 year (around 9/30/2020) for Recheck.            Cindy Chavarria, APRN, ACNP-BC, CHFN-BC

## 2019-09-30 NOTE — ASSESSMENT & PLAN NOTE
Class II, Stage C. Compensated. Encouraged use of compression to BLEs to help with edema. Continue present therapy.

## 2019-09-30 NOTE — ASSESSMENT & PLAN NOTE
Requested and will review most recent lipids. Continue healthy diet and atorvastatin. Encouraged routine aerobic activity as he can tolerate.

## 2019-10-01 DIAGNOSIS — Z79.899 CURRENT USE OF PROTON PUMP INHIBITOR: Primary | ICD-10-CM

## 2019-10-01 DIAGNOSIS — K29.80 DUODENITIS: ICD-10-CM

## 2019-10-01 RX ORDER — OMEPRAZOLE 40 MG/1
40 CAPSULE, DELAYED RELEASE ORAL DAILY
Qty: 30 CAPSULE | Refills: 5 | OUTPATIENT
Start: 2019-10-01 | End: 2019-10-31

## 2019-10-07 ENCOUNTER — TELEPHONE (OUTPATIENT)
Dept: GASTROENTEROLOGY | Age: 69
End: 2019-10-07

## 2019-10-07 DIAGNOSIS — Z79.899 CURRENT USE OF PROTON PUMP INHIBITOR: ICD-10-CM

## 2019-10-07 DIAGNOSIS — K29.80 DUODENITIS: ICD-10-CM

## 2019-10-07 LAB
ANION GAP SERPL CALCULATED.3IONS-SCNC: 14 MMOL/L (ref 7–19)
BUN BLDV-MCNC: 39 MG/DL (ref 8–23)
CALCIUM SERPL-MCNC: 9.7 MG/DL (ref 8.8–10.2)
CHLORIDE BLD-SCNC: 107 MMOL/L (ref 98–111)
CO2: 27 MMOL/L (ref 22–29)
CREAT SERPL-MCNC: 5.6 MG/DL (ref 0.5–1.2)
GFR NON-AFRICAN AMERICAN: 10
GLUCOSE BLD-MCNC: 128 MG/DL (ref 74–109)
POTASSIUM SERPL-SCNC: 4.3 MMOL/L (ref 3.5–5)
SODIUM BLD-SCNC: 148 MMOL/L (ref 136–145)

## 2019-10-08 RX ORDER — OMEPRAZOLE 40 MG/1
40 CAPSULE, DELAYED RELEASE ORAL DAILY
Qty: 30 CAPSULE | Refills: 5 | Status: SHIPPED | OUTPATIENT
Start: 2019-10-08 | End: 2020-01-11

## 2019-10-21 DIAGNOSIS — R01.1 HEART MURMUR: ICD-10-CM

## 2019-10-24 NOTE — PROGRESS NOTES
Notified patient of echo results with EF 55-60%, grade 2 diastolic dysfunction, mild AS and AI, ascending aorta dilation 4.2 cm, mild MR, moderate LAE, RV at upper limit of normal size with normal systolic function, and mild PI. Advised him that we would repeat echo in 1 year to monitor size of ascending aorta and progression of AS. Discussed importance of good BP control and to avoid straining/lifting heavy objects.

## 2019-10-29 ENCOUNTER — CLINICAL SUPPORT (OUTPATIENT)
Dept: CARDIOLOGY | Facility: CLINIC | Age: 69
End: 2019-10-29

## 2019-10-29 DIAGNOSIS — Z95.0 PACEMAKER: ICD-10-CM

## 2019-10-29 PROCEDURE — 93288 INTERROG EVL PM/LDLS PM IP: CPT | Performed by: NURSE PRACTITIONER

## 2019-10-29 NOTE — PROGRESS NOTES
Pacemaker Evaluation Report    October 29, 2019    Indication for pacemaker: sinus node dysfunction - tachy/yesi    Implanting MD: Dr. Howe    : Medtronic  Model: Adapta [id number]    Implant Date: 11/21/14    Reason For Evaluation: routine      DIAGNOSTIC DATA    Atrial paced: 71.6%  Ventricular paced: 99.3%    Battery status: satisfactory  Voltage: 2.78 V  Estimated battery life: 9.5 years     18 AHR episodes up to 73 hours with 29 mode switches, 2 VHR episodes for 5 seconds. Increased atrial fibrillation burden. Not anticoagulated. 2.5% afib burden increased from 0.4%.May have been on warfarin in the past but doesn't know why discontinued. He did not check with wife to see why discontinued. She reports today that he was on clopidogrel in the past. I will contact Dr. Arzate to see if ok to start anticoagulation (on HD).     FINAL PARAMETERS    Changes made: none    Conclusions: normal pacemaker function, stable pacing and sensing thresholds, adequate battery reserve and atrial fibrillation burden has increased    Return in about 6 months (around 4/29/2020) for Pacemaker Clinic [employer].

## 2019-10-30 DIAGNOSIS — I48.0 PAROXYSMAL ATRIAL FIBRILLATION (HCC): Primary | ICD-10-CM

## 2019-10-30 NOTE — PROGRESS NOTES
Advise pt that Dr. Arzate is ok with him starting anticoagulation to decrease stroke. Advise that Eliquis 5mg bid sent to [facility] and he can come by and get 30 day free trial card. Advise to call if any severe bruising or bleeding.

## 2019-11-05 ENCOUNTER — TELEPHONE (OUTPATIENT)
Dept: CARDIOLOGY | Facility: CLINIC | Age: 69
End: 2019-11-05

## 2020-01-01 ENCOUNTER — OUTSIDE FACILITY SERVICE (OUTPATIENT)
Dept: CARDIOLOGY | Facility: CLINIC | Age: 70
End: 2020-01-01

## 2020-01-01 ENCOUNTER — TELEPHONE (OUTPATIENT)
Dept: CARDIOLOGY | Facility: CLINIC | Age: 70
End: 2020-01-01

## 2020-01-01 ENCOUNTER — OFFICE VISIT (OUTPATIENT)
Dept: CARDIOLOGY | Facility: CLINIC | Age: 70
End: 2020-01-01

## 2020-01-01 ENCOUNTER — CLINICAL SUPPORT (OUTPATIENT)
Dept: CARDIOLOGY | Facility: CLINIC | Age: 70
End: 2020-01-01

## 2020-01-01 VITALS
SYSTOLIC BLOOD PRESSURE: 140 MMHG | OXYGEN SATURATION: 99 % | TEMPERATURE: 97.3 F | WEIGHT: 167.4 LBS | BODY MASS INDEX: 26.9 KG/M2 | HEART RATE: 86 BPM | HEIGHT: 66 IN | DIASTOLIC BLOOD PRESSURE: 80 MMHG

## 2020-01-01 DIAGNOSIS — E78.2 MIXED DYSLIPIDEMIA: Chronic | ICD-10-CM

## 2020-01-01 DIAGNOSIS — I35.0 AORTIC STENOSIS, MILD: ICD-10-CM

## 2020-01-01 DIAGNOSIS — I49.5 SINUS NODE DYSFUNCTION (HCC): ICD-10-CM

## 2020-01-01 DIAGNOSIS — I48.0 PAROXYSMAL ATRIAL FIBRILLATION (HCC): Chronic | ICD-10-CM

## 2020-01-01 DIAGNOSIS — Z95.1 S/P CABG X 3: Chronic | ICD-10-CM

## 2020-01-01 DIAGNOSIS — I50.32 CHRONIC DIASTOLIC HEART FAILURE (HCC): Chronic | ICD-10-CM

## 2020-01-01 DIAGNOSIS — I48.0 PAROXYSMAL ATRIAL FIBRILLATION (HCC): ICD-10-CM

## 2020-01-01 DIAGNOSIS — I50.42 CHRONIC COMBINED SYSTOLIC AND DIASTOLIC CONGESTIVE HEART FAILURE (HCC): Primary | ICD-10-CM

## 2020-01-01 DIAGNOSIS — I10 ESSENTIAL HYPERTENSION: Chronic | ICD-10-CM

## 2020-01-01 DIAGNOSIS — Z95.0 PACEMAKER: ICD-10-CM

## 2020-01-01 DIAGNOSIS — I25.10 CORONARY ARTERY DISEASE INVOLVING NATIVE CORONARY ARTERY OF NATIVE HEART WITHOUT ANGINA PECTORIS: Primary | Chronic | ICD-10-CM

## 2020-01-01 PROCEDURE — 93306 TTE W/DOPPLER COMPLETE: CPT | Performed by: INTERNAL MEDICINE

## 2020-01-01 PROCEDURE — 93000 ELECTROCARDIOGRAM COMPLETE: CPT | Performed by: NURSE PRACTITIONER

## 2020-01-01 PROCEDURE — 99214 OFFICE O/P EST MOD 30 MIN: CPT | Performed by: NURSE PRACTITIONER

## 2020-01-01 PROCEDURE — 93288 INTERROG EVL PM/LDLS PM IP: CPT | Performed by: NURSE PRACTITIONER

## 2020-01-01 RX ORDER — MEGESTROL ACETATE 40 MG/1
40 TABLET ORAL DAILY
COMMUNITY
Start: 2020-01-01

## 2020-01-01 RX ORDER — NITROGLYCERIN 0.4 MG/1
0.4 TABLET SUBLINGUAL
Qty: 25 TABLET | Refills: 11 | Status: SHIPPED | OUTPATIENT
Start: 2020-01-01 | End: 2021-10-29

## 2020-01-01 RX ORDER — TRAZODONE HYDROCHLORIDE 100 MG/1
TABLET ORAL NIGHTLY
COMMUNITY
Start: 2020-01-01

## 2020-01-11 ENCOUNTER — APPOINTMENT (OUTPATIENT)
Dept: CT IMAGING | Age: 70
End: 2020-01-11
Payer: MEDICARE

## 2020-01-11 ENCOUNTER — HOSPITAL ENCOUNTER (EMERGENCY)
Age: 70
Discharge: HOME | End: 2020-01-11
Attending: EMERGENCY MEDICINE
Payer: MEDICARE

## 2020-01-11 VITALS
BODY MASS INDEX: 28.12 KG/M2 | TEMPERATURE: 97.8 F | SYSTOLIC BLOOD PRESSURE: 115 MMHG | WEIGHT: 175 LBS | HEART RATE: 70 BPM | HEIGHT: 66 IN | RESPIRATION RATE: 18 BRPM | DIASTOLIC BLOOD PRESSURE: 70 MMHG | OXYGEN SATURATION: 91 %

## 2020-01-11 LAB
ALBUMIN SERPL-MCNC: 3.2 G/DL (ref 3.5–5.2)
ALP BLD-CCNC: 105 U/L (ref 40–130)
ALT SERPL-CCNC: 16 U/L (ref 5–41)
ANION GAP SERPL CALCULATED.3IONS-SCNC: 12 MMOL/L (ref 7–19)
AST SERPL-CCNC: 19 U/L (ref 5–40)
BASOPHILS ABSOLUTE: 0 K/UL (ref 0–0.2)
BASOPHILS RELATIVE PERCENT: 0.3 % (ref 0–1)
BILIRUB SERPL-MCNC: 0.4 MG/DL (ref 0.2–1.2)
BUN BLDV-MCNC: 31 MG/DL (ref 8–23)
CALCIUM SERPL-MCNC: 9.1 MG/DL (ref 8.8–10.2)
CHLORIDE BLD-SCNC: 103 MMOL/L (ref 98–111)
CO2: 27 MMOL/L (ref 22–29)
CREAT SERPL-MCNC: 5.8 MG/DL (ref 0.5–1.2)
EOSINOPHILS ABSOLUTE: 0 K/UL (ref 0–0.6)
EOSINOPHILS RELATIVE PERCENT: 1.2 % (ref 0–5)
GFR NON-AFRICAN AMERICAN: 10
GLUCOSE BLD-MCNC: 109 MG/DL (ref 74–109)
HCT VFR BLD CALC: 36.4 % (ref 42–52)
HEMOGLOBIN: 12.5 G/DL (ref 14–18)
IMMATURE GRANULOCYTES #: 0 K/UL
LIPASE: 45 U/L (ref 13–60)
LYMPHOCYTES ABSOLUTE: 1.1 K/UL (ref 1.1–4.5)
LYMPHOCYTES RELATIVE PERCENT: 30.5 % (ref 20–40)
MCH RBC QN AUTO: 34.2 PG (ref 27–31)
MCHC RBC AUTO-ENTMCNC: 34.3 G/DL (ref 33–37)
MCV RBC AUTO: 99.5 FL (ref 80–94)
MONOCYTES ABSOLUTE: 0.3 K/UL (ref 0–0.9)
MONOCYTES RELATIVE PERCENT: 9.8 % (ref 0–10)
NEUTROPHILS ABSOLUTE: 2 K/UL (ref 1.5–7.5)
NEUTROPHILS RELATIVE PERCENT: 57.9 % (ref 50–65)
PDW BLD-RTO: 12.9 % (ref 11.5–14.5)
PLATELET # BLD: 105 K/UL (ref 130–400)
PMV BLD AUTO: 11.3 FL (ref 9.4–12.4)
POTASSIUM SERPL-SCNC: 3.3 MMOL/L (ref 3.5–5)
RBC # BLD: 3.66 M/UL (ref 4.7–6.1)
SODIUM BLD-SCNC: 142 MMOL/L (ref 136–145)
TOTAL PROTEIN: 5.6 G/DL (ref 6.6–8.7)
WBC # BLD: 3.5 K/UL (ref 4.8–10.8)

## 2020-01-11 PROCEDURE — 99284 EMERGENCY DEPT VISIT MOD MDM: CPT

## 2020-01-11 PROCEDURE — 96375 TX/PRO/DX INJ NEW DRUG ADDON: CPT

## 2020-01-11 PROCEDURE — 36415 COLL VENOUS BLD VENIPUNCTURE: CPT

## 2020-01-11 PROCEDURE — 6360000002 HC RX W HCPCS: Performed by: EMERGENCY MEDICINE

## 2020-01-11 PROCEDURE — 74176 CT ABD & PELVIS W/O CONTRAST: CPT

## 2020-01-11 PROCEDURE — 85025 COMPLETE CBC W/AUTO DIFF WBC: CPT

## 2020-01-11 PROCEDURE — 96374 THER/PROPH/DIAG INJ IV PUSH: CPT

## 2020-01-11 PROCEDURE — 83690 ASSAY OF LIPASE: CPT

## 2020-01-11 PROCEDURE — 80053 COMPREHEN METABOLIC PANEL: CPT

## 2020-01-11 PROCEDURE — 6370000000 HC RX 637 (ALT 250 FOR IP): Performed by: EMERGENCY MEDICINE

## 2020-01-11 RX ORDER — MORPHINE SULFATE 4 MG/ML
4 INJECTION, SOLUTION INTRAMUSCULAR; INTRAVENOUS ONCE
Status: COMPLETED | OUTPATIENT
Start: 2020-01-11 | End: 2020-01-11

## 2020-01-11 RX ORDER — METRONIDAZOLE 500 MG/1
500 TABLET ORAL 2 TIMES DAILY
Qty: 14 TABLET | Refills: 0 | Status: SHIPPED | OUTPATIENT
Start: 2020-01-11 | End: 2020-01-18

## 2020-01-11 RX ORDER — ONDANSETRON 4 MG/1
4 TABLET, ORALLY DISINTEGRATING ORAL EVERY 8 HOURS PRN
Qty: 15 TABLET | Refills: 0 | Status: SHIPPED | OUTPATIENT
Start: 2020-01-11 | End: 2020-02-18 | Stop reason: ALTCHOICE

## 2020-01-11 RX ORDER — CIPROFLOXACIN 500 MG/1
250 TABLET, FILM COATED ORAL 2 TIMES DAILY
Qty: 7 TABLET | Refills: 0 | Status: SHIPPED | OUTPATIENT
Start: 2020-01-11 | End: 2020-01-18

## 2020-01-11 RX ORDER — ONDANSETRON 2 MG/ML
4 INJECTION INTRAMUSCULAR; INTRAVENOUS ONCE
Status: COMPLETED | OUTPATIENT
Start: 2020-01-11 | End: 2020-01-11

## 2020-01-11 RX ORDER — HYDROCODONE BITARTRATE AND ACETAMINOPHEN 5; 325 MG/1; MG/1
1 TABLET ORAL EVERY 6 HOURS PRN
Qty: 12 TABLET | Refills: 0 | Status: SHIPPED | OUTPATIENT
Start: 2020-01-11 | End: 2020-01-14

## 2020-01-11 RX ORDER — HYDROCODONE BITARTRATE AND ACETAMINOPHEN 5; 325 MG/1; MG/1
1 TABLET ORAL EVERY 6 HOURS PRN
Qty: 12 TABLET | Refills: 0 | Status: SHIPPED | OUTPATIENT
Start: 2020-01-11 | End: 2020-01-11 | Stop reason: SDUPTHER

## 2020-01-11 RX ADMIN — POTASSIUM BICARBONATE 40 MEQ: 782 TABLET, EFFERVESCENT ORAL at 22:26

## 2020-01-11 RX ADMIN — MORPHINE SULFATE 4 MG: 4 INJECTION, SOLUTION INTRAMUSCULAR; INTRAVENOUS at 21:22

## 2020-01-11 RX ADMIN — ONDANSETRON 4 MG: 2 INJECTION INTRAMUSCULAR; INTRAVENOUS at 21:22

## 2020-01-11 ASSESSMENT — ENCOUNTER SYMPTOMS
ABDOMINAL PAIN: 1
SHORTNESS OF BREATH: 0
NAUSEA: 0
BLOOD IN STOOL: 0
DIARRHEA: 1
SORE THROAT: 0
COUGH: 0
VOMITING: 0
RHINORRHEA: 0
BACK PAIN: 0

## 2020-01-11 ASSESSMENT — PAIN DESCRIPTION - ORIENTATION: ORIENTATION: LOWER

## 2020-01-11 ASSESSMENT — PAIN SCALES - GENERAL
PAINLEVEL_OUTOF10: 6
PAINLEVEL_OUTOF10: 5

## 2020-01-11 ASSESSMENT — PAIN DESCRIPTION - LOCATION: LOCATION: ABDOMEN

## 2020-01-12 NOTE — ED PROVIDER NOTES
[facility]  eMERGENCY dEPARTMENT eNCOUnter      Pt Name: Shaylee Handley  MRN: [id number]  [place] 1950  Date of evaluation: 1/11/2020  Provider: Lexy Cisse MD    [address] [place] [address]       Chief Complaint   Patient presents with    Abdominal Pain     Pt arrived to the ed with c/o lower abd pain. onset tonight. Pt states he has had a stomach bug the past few days, but the pain started tonight. Pt is a periteoneal dialysis pt.  Diarrhea         HISTORY OF PRESENT ILLNESS   (Location/Symptom, Timing/Onset,Context/Setting, Quality, Duration, Modifying Factors, Severity)  Note limiting factors. Shaylee Handley is a 81 y.o. male who presents to the emergency department for abdominal discomfort and diarrhea. The patient states that he has had approximately 4 episodes of watery diarrhea over the past 3 days and couple of hours ago after eating some toast had some crampy lower abdominal pain. He has not had any vomiting or fevers. He is a peritoneal dialysis patient and dialyzes nightly at home. States he has not had any issues with this in his been going well. He makes minimal amount of urine and denies any urinary symptoms. No sick contacts that he is aware of. HPI    NursingNotes were reviewed. REVIEW OF SYSTEMS    (2-9 systems for level 4, 10 or more for level 5)     Review of Systems   Constitutional: Negative for chills and fever. HENT: Negative for rhinorrhea and sore throat. Respiratory: Negative for cough and shortness of breath. Cardiovascular: Negative for chest pain and leg swelling. Gastrointestinal: Positive for abdominal pain and diarrhea. Negative for blood in stool, nausea and vomiting. Genitourinary: Negative for dysuria and frequency. Musculoskeletal: Negative for back pain and neck pain. Neurological: Negative for dizziness and headaches. All other systems reviewed and are negative.            PAST MEDICALHISTORY     Past Medical History:   Diagnosis Date    CAD every 6 hours as needed for Pain    ALLOPURINOL (ZYLOPRIM) 100 MG TABLET    100 mg 2 times daily Indications: Arthritis     AMLODIPINE (NORVASC) 10 MG TABLET    Take 5 mg by mouth daily     APIXABAN (ELIQUIS PO)    Take by mouth 2 times daily    ASPIRIN 81 MG TABLET    Take 81 mg by mouth daily    B COMPLEX-C-FOLIC ACID ER PO    Take 800 mg by mouth 2 times daily    BUMETANIDE (BUMEX) 2 MG TABLET    Take 2 mg by mouth 2 times daily     CALCITRIOL (ROCALTROL) 0.25 MCG CAPSULE    Take 0.25 mcg by mouth daily    CARVEDILOL (COREG) 12.5 MG TABLET    Take 12.5 mg by mouth 2 times daily (with meals) Indications: High Blood Pressure     ERGOCALCIFEROL (VITAMIN D2 PO)    Take by mouth every 30 days    FLUTICASONE-SALMETEROL (ADVAIR) 100-50 MCG/DOSE DISKUS INHALER    Inhale 1 puff into the lungs every 12 hours    GENTAMICIN (GARAMYCIN) 0.1 % CREAM    Apply topically daily Apply topically 3 times daily. MINOXIDIL (LONITEN) 2.5 MG TABLET    Take 2.5 mg by mouth 2 times daily Indications: High Blood Pressure Disorder, 5mg at night and 2.5 in am.     POTASSIUM CHLORIDE ER PO    Take 25 mEq by mouth daily    SITAGLIPTIN (JANUVIA) 25 MG TABLET    Take 50 mg by mouth daily Indications: Diabetes        ALLERGIES     Patient has no known allergies.     FAMILY HISTORY       Family History   Problem Relation Age of Onset    Heart Disease Mother     Heart Disease Father     Colon Cancer Sister     Esophageal Cancer Neg Hx     Liver Cancer Neg Hx     Liver Disease Neg Hx     Rectal Cancer Neg Hx     Stomach Cancer Neg Hx           SOCIAL HISTORY       Social History     Socioeconomic History    Marital status:      Spouse name: None    Number of children: None    Years of education: None    Highest education level: None   Occupational History    None   Social Needs    Financial resource strain: None    Food insecurity:     Worry: None     Inability: None    Transportation needs:     Medical: None     Non-medical: Comments: X marks site of peritoneal cathether c/d/i non tender around site   Musculoskeletal: Normal range of motion. Skin:     General: Skin is warm and dry. Neurological:      Mental Status: He is alert and oriented to person, place, and time. DIAGNOSTIC RESULTS         RADIOLOGY:  Non-plain film images such as CT, Ultrasound and MRI are read by the radiologist. Plain radiographic images are visualized and preliminarily interpreted bythe emergency physician with the below findings:        CT ABDOMEN PELVIS WO CONTRAST Additional Contrast? None   Final Result   1. Rectal and sigmoid wall thickening, likely representing   proctocolitis. 2.  Nonobstructing bilateral renal calculi. 3.  Peritoneal dialysis catheter terminates in the midline pelvis   without evidence of complication. Small volume intraperitoneal   dialysate. Signed by Dr Aldair Garzon on 1/11/2020 9:46 PM              LABS:  Labs Reviewed   CBC WITH AUTO DIFFERENTIAL - Abnormal; Notable for the following components:       Result Value    WBC 3.5 (*)     RBC 3.66 (*)     Hemoglobin 12.5 (*)     Hematocrit 36.4 (*)     MCV 99.5 (*)     MCH 34.2 (*)     Platelets 346 (*)     All other components within normal limits   COMPREHENSIVE METABOLIC PANEL - Abnormal; Notable for the following components:    Potassium 3.3 (*)     BUN 31 (*)     CREATININE 5.8 (*)     GFR Non- 10 (*)     Total Protein 5.6 (*)     Alb 3.2 (*)     All other components within normal limits   LIPASE       All other labs were within normal range or not returned as of this dictation.     EMERGENCY DEPARTMENT COURSE and DIFFERENTIAL DIAGNOSIS/MDM:   Vitals:    Vitals:    01/11/20 2048 01/11/20 2230   BP: 111/73 115/70   Pulse: 75 70   Resp: 20 18   Temp: 97.7 °F (36.5 °C) 97.8 °F (36.6 °C)   TempSrc: Oral Oral   SpO2: 91% 91%   Weight: 175 lb (79.4 kg)    Height: 5' 6\" (1.676 m)        MDM    VSS, overall abd exam very benign, several episodes of diarrhea

## 2020-01-14 PROBLEM — D63.1 ANEMIA IN CHRONIC KIDNEY DISEASE, ON CHRONIC DIALYSIS (HCC): Status: ACTIVE | Noted: 2020-01-14

## 2020-01-14 PROBLEM — N18.6 ANEMIA IN CHRONIC KIDNEY DISEASE, ON CHRONIC DIALYSIS (HCC): Status: ACTIVE | Noted: 2020-01-14

## 2020-01-14 PROBLEM — N18.9 HISTORY OF ANEMIA DUE TO CHRONIC KIDNEY DISEASE: Status: ACTIVE | Noted: 2020-01-14

## 2020-01-14 PROBLEM — Z86.2 HISTORY OF ANEMIA DUE TO CHRONIC KIDNEY DISEASE: Status: ACTIVE | Noted: 2020-01-14

## 2020-01-14 PROBLEM — Z99.2 ANEMIA IN CHRONIC KIDNEY DISEASE, ON CHRONIC DIALYSIS (HCC): Status: ACTIVE | Noted: 2020-01-14

## 2020-01-23 ENCOUNTER — HOSPITAL ENCOUNTER (OUTPATIENT)
Dept: INFUSION THERAPY | Age: 70
Setting detail: INFUSION SERIES
Discharge: HOME | End: 2020-01-23
Payer: MEDICARE

## 2020-01-23 VITALS
HEART RATE: 61 BPM | TEMPERATURE: 98 F | RESPIRATION RATE: 18 BRPM | SYSTOLIC BLOOD PRESSURE: 100 MMHG | DIASTOLIC BLOOD PRESSURE: 55 MMHG

## 2020-01-23 DIAGNOSIS — Z86.2 HISTORY OF ANEMIA DUE TO CHRONIC KIDNEY DISEASE: Primary | ICD-10-CM

## 2020-01-23 DIAGNOSIS — N18.9 ANEMIA OF CHRONIC RENAL FAILURE, UNSPECIFIED CKD STAGE: ICD-10-CM

## 2020-01-23 DIAGNOSIS — D63.1 ANEMIA OF CHRONIC RENAL FAILURE, UNSPECIFIED CKD STAGE: ICD-10-CM

## 2020-01-23 DIAGNOSIS — N18.5 CHRONIC KIDNEY DISEASE, STAGE 5 (HCC): ICD-10-CM

## 2020-01-23 DIAGNOSIS — N18.9 HISTORY OF ANEMIA DUE TO CHRONIC KIDNEY DISEASE: Primary | ICD-10-CM

## 2020-01-23 PROCEDURE — 6360000002 HC RX W HCPCS: Performed by: CLINICAL NURSE SPECIALIST

## 2020-01-23 PROCEDURE — 96365 THER/PROPH/DIAG IV INF INIT: CPT

## 2020-01-23 PROCEDURE — 2580000003 HC RX 258: Performed by: CLINICAL NURSE SPECIALIST

## 2020-01-23 RX ADMIN — FERUMOXYTOL 510 MG: 510 INJECTION INTRAVENOUS at 12:45

## 2020-02-03 ENCOUNTER — HOSPITAL ENCOUNTER (OUTPATIENT)
Dept: INFUSION THERAPY | Age: 70
Setting detail: INFUSION SERIES
Discharge: HOME | End: 2020-02-03
Payer: MEDICARE

## 2020-02-03 VITALS
HEART RATE: 60 BPM | DIASTOLIC BLOOD PRESSURE: 72 MMHG | TEMPERATURE: 97.9 F | SYSTOLIC BLOOD PRESSURE: 137 MMHG | RESPIRATION RATE: 17 BRPM

## 2020-02-03 DIAGNOSIS — D63.1 ANEMIA OF CHRONIC RENAL FAILURE, UNSPECIFIED CKD STAGE: ICD-10-CM

## 2020-02-03 DIAGNOSIS — Z86.2 HISTORY OF ANEMIA DUE TO CHRONIC KIDNEY DISEASE: Primary | ICD-10-CM

## 2020-02-03 DIAGNOSIS — N18.9 HISTORY OF ANEMIA DUE TO CHRONIC KIDNEY DISEASE: Primary | ICD-10-CM

## 2020-02-03 DIAGNOSIS — N18.5 CHRONIC KIDNEY DISEASE, STAGE 5 (HCC): ICD-10-CM

## 2020-02-03 DIAGNOSIS — N18.9 ANEMIA OF CHRONIC RENAL FAILURE, UNSPECIFIED CKD STAGE: ICD-10-CM

## 2020-02-03 PROCEDURE — 2580000003 HC RX 258: Performed by: CLINICAL NURSE SPECIALIST

## 2020-02-03 PROCEDURE — 6360000002 HC RX W HCPCS: Performed by: CLINICAL NURSE SPECIALIST

## 2020-02-03 PROCEDURE — 96365 THER/PROPH/DIAG IV INF INIT: CPT

## 2020-02-03 RX ADMIN — FERUMOXYTOL 510 MG: 510 INJECTION INTRAVENOUS at 13:21

## 2020-02-11 LAB
APPEARANCE FLUID: NORMAL
CELL COUNT FLUID TYPE: NORMAL
CLOT EVALUATION: NORMAL
COLOR FLUID: NORMAL
NUCLEATED CELLS FLUID: 100 /CUMM
NUCLEATED CELLS FLUID: 100 /CUMM
NUMBER OF CELLS COUNTED FLUID: 100
RBC FLUID: 485 /CUMM
RBC FLUID: 485 /CUMM

## 2020-02-18 ENCOUNTER — TELEPHONE (OUTPATIENT)
Dept: GASTROENTEROLOGY | Age: 70
End: 2020-02-18

## 2020-02-18 ENCOUNTER — OFFICE VISIT (OUTPATIENT)
Dept: GASTROENTEROLOGY | Age: 70
End: 2020-02-18
Payer: MEDICARE

## 2020-02-18 ENCOUNTER — HOSPITAL ENCOUNTER (OUTPATIENT)
Dept: GENERAL RADIOLOGY | Age: 70
Discharge: HOME | End: 2020-02-18
Payer: MEDICARE

## 2020-02-18 VITALS
WEIGHT: 167.4 LBS | HEART RATE: 78 BPM | BODY MASS INDEX: 26.9 KG/M2 | SYSTOLIC BLOOD PRESSURE: 130 MMHG | HEIGHT: 66 IN | DIASTOLIC BLOOD PRESSURE: 80 MMHG | OXYGEN SATURATION: 97 %

## 2020-02-18 PROBLEM — R10.84 GENERALIZED ABDOMINAL PAIN: Status: ACTIVE | Noted: 2020-02-18

## 2020-02-18 PROBLEM — Z99.2 ESRD (END STAGE RENAL DISEASE) ON DIALYSIS (HCC): Status: ACTIVE | Noted: 2020-02-18

## 2020-02-18 PROBLEM — Z79.01 CURRENT USE OF LONG TERM ANTICOAGULATION: Status: ACTIVE | Noted: 2020-02-18

## 2020-02-18 PROBLEM — N18.6 ESRD (END STAGE RENAL DISEASE) ON DIALYSIS (HCC): Status: ACTIVE | Noted: 2020-02-18

## 2020-02-18 PROCEDURE — G8417 CALC BMI ABV UP PARAM F/U: HCPCS | Performed by: NURSE PRACTITIONER

## 2020-02-18 PROCEDURE — 1036F TOBACCO NON-USER: CPT | Performed by: NURSE PRACTITIONER

## 2020-02-18 PROCEDURE — 4040F PNEUMOC VAC/ADMIN/RCVD: CPT | Performed by: NURSE PRACTITIONER

## 2020-02-18 PROCEDURE — G8484 FLU IMMUNIZE NO ADMIN: HCPCS | Performed by: NURSE PRACTITIONER

## 2020-02-18 PROCEDURE — 1123F ACP DISCUSS/DSCN MKR DOCD: CPT | Performed by: NURSE PRACTITIONER

## 2020-02-18 PROCEDURE — 3017F COLORECTAL CA SCREEN DOC REV: CPT | Performed by: NURSE PRACTITIONER

## 2020-02-18 PROCEDURE — 74018 RADEX ABDOMEN 1 VIEW: CPT

## 2020-02-18 PROCEDURE — G8427 DOCREV CUR MEDS BY ELIG CLIN: HCPCS | Performed by: NURSE PRACTITIONER

## 2020-02-18 PROCEDURE — 99215 OFFICE O/P EST HI 40 MIN: CPT | Performed by: NURSE PRACTITIONER

## 2020-02-18 ASSESSMENT — ENCOUNTER SYMPTOMS
COUGH: 0
CONSTIPATION: 0
TROUBLE SWALLOWING: 0
BLOOD IN STOOL: 0
SORE THROAT: 0
SHORTNESS OF BREATH: 0
RECTAL PAIN: 0
ABDOMINAL DISTENTION: 0
BACK PAIN: 0
ABDOMINAL PAIN: 1
PHOTOPHOBIA: 0
ANAL BLEEDING: 0
VOMITING: 0
NAUSEA: 0
DIARRHEA: 1
VOICE CHANGE: 0

## 2020-02-18 NOTE — TELEPHONE ENCOUNTER
----- Message from Eldora Paget, APRN sent at 2/18/2020 11:01 AM CST -----  Please let pt know there is no sign of bowel obstruction

## 2020-02-18 NOTE — PATIENT INSTRUCTIONS

## 2020-02-18 NOTE — PROGRESS NOTES
Subjective:      Shaylee Handley is a79 y.o. male  Chief Complaint   Patient presents with    Diarrhea     referred from dialysis NP for a colonoscopy        HPI  PCP: Jazmine Gonzalez MD  Referring Provider: LOIDA Steward  Pt is referred here from [facility] for a colonoscopy due to c/o diarrhea. The diarrhea started in the summer of 2019. Has 1-2 diarrhea stools daily. No one else in family with diarrhea. No blood in stool. Stools have not been checked for infectious etiology. C/o generalized abdominal pain. From rib cage all the way to pelvic region. Started in the summer of 2019. Waxes and wanes. No identifyable triggers. Describes as \"it feels like meggan been exercising and its just really really sore. \"  Pain comes on 2-3 times a day, lasts sometimes a few minutes and other times a few hours. Gradually goes away on its own. No alleviating factors. Reports he went to ED last month with the above complaints, had a CT and was diagnosed with colitis, he was given antibiotoics, which didn't improve his symptoms at all. Also reports he had fluid drawn off thru his peritoneal catheter he used for dialysis and was advised there was no infection. C/o dysphagia. Noted with foods only, started 4-5 years ago. Occurs sporadically. Has to regurgitate foods up when this occurs. Occurs once a week on average. Continues to have chronic nausea. For many years. Occurs when he eats. On eliquis for hx of afib, prescribed by [facility] ann. GI scope reports in history per MA per OV policy, I reviewed this. Family HX:  Sister had colon cancer  Pt denies family hx of inflammatory bowel dx, gastric CA and esophageal CA. Past Medical History:   Diagnosis Date    CAD (coronary artery disease) 1/31/2015    sees [facility] heart group.     CHF (congestive heart failure) (HCC)     Chronic kidney disease (CKD), stage V ([facility] [address] 5/26/2017    Cigarette smoker 1/31/2015    COPD (chronic obstructive pulmonary disease) [facility] [facility]     Diabetes mellitus ([facility] [address] 1/31/2015    Diarrhea     End stage renal disease ([facility] [address] [facility]     Hypercholesteremia 1/31/2015    Hypertension 1/31/2015    Pacemaker 1/31/2015    Peritoneal dialysis catheter dysfunction (HCC)     Sleep apnea     No MACHINE    Stomach pain     Thrombocytopenia, unspecified ([facility] [address]           Past Surgical History:   Procedure Laterality Date    CARDIAC CATHETERIZATION  2/2/15  JDT    stent to mid RCA.  EF 45%    CARPAL TUNNEL RELEASE Right     CHOLECYSTECTOMY, LAPAROSCOPIC N/A 12/8/2017    CHOLECYSTECTOMY LAPAROSCOPIC performed by Michela Araya MD at [facility] COLONOSCOPY  02/22/2013    Dr Sara Rodas yr recall   [address] GRAFT  2004    EGD  2015    DR [address] Right     HERNIA REPAIR  8995?    umbilical hernia repair    KNEE ARTHROSCOPY Left     LAPAROSCOPY INSERTION PERITONEAL CATHETER N/A 5/26/2017    LAPAROSCOPIC INSERTION PERITONEAL DIALYSIS CATHETER WITH OMENTOPEXY performed by Dilcia Ortiz MD at [facility] N/A 7/17/2017    DIAGNOSTIC LAPAROSCOPY performed by Dilcia Ortiz MD at [address]      also pacemaker replacement    SD COLONOSCOPY FLX DX W/COLLJ SPEC WHEN PFRMD N/A 11/13/2018    Dr HAILEY Melo-Serrated HP x 1, HP x 2, RANDOM COLON BX NEGATIVE--3 yr recall    SD EGD TRANSORAL BIOPSY SINGLE/MULTIPLE N/A 11/13/2018    Dr Brigido Melo-1cm round lesion in antrum, questionable polyp vs mass in duodenum--Repeat in 3 months (2/15/19)    UPPER GASTROINTESTINAL ENDOSCOPY N/A 2/15/2019    Dr HAILEY Melo-Duodenitis, gastritis       Social History     Socioeconomic History    Marital status:      Spouse name: None    Number of children: None    Years of education: None    Highest education level: None   Occupational History    None   Social Needs    Financial resource strain: None    Food insecurity:     Worry: None     Inability: None    Pupils: Pupils are equal, round, and reactive to light. Neck:      Musculoskeletal: Normal range of motion and neck supple. Thyroid: No thyromegaly. Cardiovascular:      Rate and Rhythm: Normal rate and regular rhythm. Heart sounds: Normal heart sounds. No murmur. No friction rub. No gallop. Comments: No edema noted to santa lower extremities  Pulmonary:      Effort: Pulmonary effort is normal. No respiratory distress. Breath sounds: Normal breath sounds. Abdominal:      General: Bowel sounds are normal. There is no distension. Palpations: Abdomen is soft. Tenderness: There is no abdominal tenderness. There is no rebound. Comments: Hepatosplenomegaly not appreciated  Peritoneal catheter intact   Musculoskeletal: Normal range of motion. General: No deformity. Comments: Normal gait on exam   Neurological:      Mental Status: He is alert and oriented to person, place, and time. Cranial Nerves: No cranial nerve deficit. Psychiatric:         Judgment: Judgment normal.           Assessment:       Diagnosis Orders   1. Generalized abdominal pain  XR ABDOMEN (KUB) (SINGLE AP VIEW)    COLONOSCOPY W/ OR W/O BIOPSY    ESOPHAGOSCOPY / EGD   2. Diarrhea, unspecified type  COLONOSCOPY W/ OR W/O BIOPSY   3. Dysphagia, unspecified type  ESOPHAGOSCOPY / EGD   4. ESRD (end stage renal disease) on dialysis ([facility] [address]     5. Current use of long term anticoagulation     6. Chronic nausea  ESOPHAGOSCOPY / EGD         Plan:      1. KUB today- will call him with results  2. diatherix rectal swab- will call him with results  3. Schedule outpatient endoscopy. Patient advised no Aspirin, Fish Oil, Vit E or NSAIDs 5 (five) days before procedure. Follow-up Visit: per Dr. Escobar Buys clearance from [employer]  to stop eliquis  Clearance for discontinuing anticoagulants is needed before scheduling procedure.  Increased r isks may be associated with discontinuation of this medication including stroke, TIA, and cardiac event. I have discussed this with patient. Patient voices understanding and agrees to proceed with scheduling. Pt Education:   Risks, benefits, and alternatives to endoscopy were discussed. Patient voices understanding of risks of, but not limited to, perforation, bleeding, and infection. The risk of perforation is increased with esophageal dilatation. All questions answered to patient's satisfaction. Patient is agreable to proceed. 4. Schedule outpatient colonoscopy. Patient advised no Aspirin, Fish Oil, Vit E or NSAIDs 5 (five) days before procedure. Follow-up Visit: per Dr Jael Cruz clearance from [employer]  to stop  eliquis  Clearance for discontinuing anticoagulants is needed before scheduling procedure. Increased r isks may be associated with discontinuation of this medication including stroke, TIA, and cardiac event. I have discussed this with patient. Patient voices understanding and agrees to proceed with scheduling. Pt education:  Risks, benefits, and alternatives to colonoscopy were discussed. Risks of colonoscopy include, but are not limited to, perforation, bleeding, and infection. We discussed that the risk for perforation is 1-3 in 5,000  at the time of colonoscopy;   and 1-2% risk of bleeding post-polypectomy. All questions answered to the satisfaction of the patient. Pt is agreeable to proceed.

## 2020-02-20 ENCOUNTER — TELEPHONE (OUTPATIENT)
Dept: GASTROENTEROLOGY | Age: 70
End: 2020-02-20

## 2020-02-26 ENCOUNTER — OFFICE VISIT (OUTPATIENT)
Dept: SURGERY | Age: 70
End: 2020-02-26
Payer: MEDICARE

## 2020-02-26 ENCOUNTER — HOSPITAL ENCOUNTER (OUTPATIENT)
Dept: PREADMISSION TESTING | Age: 70
Discharge: HOME | End: 2020-03-01
Payer: MEDICARE

## 2020-02-26 VITALS
HEART RATE: 83 BPM | HEIGHT: 66 IN | BODY MASS INDEX: 27.64 KG/M2 | WEIGHT: 172 LBS | OXYGEN SATURATION: 99 % | TEMPERATURE: 98.1 F

## 2020-02-26 VITALS — HEIGHT: 66 IN | WEIGHT: 167 LBS | BODY MASS INDEX: 26.84 KG/M2

## 2020-02-26 PROCEDURE — 1123F ACP DISCUSS/DSCN MKR DOCD: CPT | Performed by: PHYSICIAN ASSISTANT

## 2020-02-26 PROCEDURE — 99214 OFFICE O/P EST MOD 30 MIN: CPT | Performed by: PHYSICIAN ASSISTANT

## 2020-02-26 PROCEDURE — 4040F PNEUMOC VAC/ADMIN/RCVD: CPT | Performed by: PHYSICIAN ASSISTANT

## 2020-02-26 PROCEDURE — 3017F COLORECTAL CA SCREEN DOC REV: CPT | Performed by: PHYSICIAN ASSISTANT

## 2020-02-26 PROCEDURE — 1036F TOBACCO NON-USER: CPT | Performed by: PHYSICIAN ASSISTANT

## 2020-02-26 PROCEDURE — G8484 FLU IMMUNIZE NO ADMIN: HCPCS | Performed by: PHYSICIAN ASSISTANT

## 2020-02-26 PROCEDURE — 93005 ELECTROCARDIOGRAM TRACING: CPT | Performed by: ANESTHESIOLOGY

## 2020-02-26 PROCEDURE — G8427 DOCREV CUR MEDS BY ELIG CLIN: HCPCS | Performed by: PHYSICIAN ASSISTANT

## 2020-02-26 PROCEDURE — G8417 CALC BMI ABV UP PARAM F/U: HCPCS | Performed by: PHYSICIAN ASSISTANT

## 2020-02-26 NOTE — LETTER
Preop Orders:     Patient: Kayla Doran  : 1950    Hospital:  [address] [place] [address]     Admitting Physician:  Dr. Sydnee Rivera pain     Procedure: diagnostic laparoscopic evaluation of PD cath, possible lysis of adhesions, possible pd cath    Anesthesia: General    Admission:Outpatient    Date:3/2/20    Labs:per anesthesia    Pre-Op Meds:  Kefzol 1grm IV    Latex Allergy: no    Beta Blocker: yes - patient advised to take    Medication Instructions: No plavix for 2 weeks prior to procedure; no asprin for 3 days prior to procedure    This has been electronically signed by Yair Montoya M.D.

## 2020-02-27 LAB
EKG P AXIS: NORMAL DEGREES
EKG P-R INTERVAL: NORMAL MS
EKG Q-T INTERVAL: 486 MS
EKG QRS DURATION: 162 MS
EKG QTC CALCULATION (BAZETT): 487 MS
EKG T AXIS: 109 DEGREES

## 2020-03-02 ENCOUNTER — ANESTHESIA EVENT (OUTPATIENT)
Dept: OPERATING ROOM | Age: 70
End: 2020-03-02
Payer: MEDICARE

## 2020-03-02 ENCOUNTER — ANESTHESIA (OUTPATIENT)
Dept: OPERATING ROOM | Age: 70
End: 2020-03-02
Payer: MEDICARE

## 2020-03-02 ENCOUNTER — HOSPITAL ENCOUNTER (OUTPATIENT)
Age: 70
Setting detail: OUTPATIENT SURGERY
Discharge: HOME | End: 2020-03-02
Attending: SURGERY | Admitting: SURGERY
Payer: MEDICARE

## 2020-03-02 VITALS
OXYGEN SATURATION: 91 % | RESPIRATION RATE: 44 BRPM | SYSTOLIC BLOOD PRESSURE: 137 MMHG | TEMPERATURE: 97 F | DIASTOLIC BLOOD PRESSURE: 74 MMHG

## 2020-03-02 VITALS
TEMPERATURE: 97.4 F | SYSTOLIC BLOOD PRESSURE: 133 MMHG | WEIGHT: 167 LBS | OXYGEN SATURATION: 94 % | HEART RATE: 56 BPM | BODY MASS INDEX: 26.84 KG/M2 | HEIGHT: 66 IN | DIASTOLIC BLOOD PRESSURE: 78 MMHG | RESPIRATION RATE: 16 BRPM

## 2020-03-02 LAB
ALBUMIN SERPL-MCNC: 3 G/DL (ref 3.5–5.2)
ALP BLD-CCNC: 77 U/L (ref 40–130)
ALT SERPL-CCNC: 13 U/L (ref 5–41)
ANION GAP SERPL CALCULATED.3IONS-SCNC: 12 MMOL/L (ref 7–19)
APTT: 26.7 SEC (ref 26–36.2)
AST SERPL-CCNC: 14 U/L (ref 5–40)
BILIRUB SERPL-MCNC: 0.7 MG/DL (ref 0.2–1.2)
BUN BLDV-MCNC: 29 MG/DL (ref 8–23)
CALCIUM SERPL-MCNC: 9.2 MG/DL (ref 8.8–10.2)
CHLORIDE BLD-SCNC: 106 MMOL/L (ref 98–111)
CO2: 26 MMOL/L (ref 22–29)
CREAT SERPL-MCNC: 5.9 MG/DL (ref 0.5–1.2)
GFR NON-AFRICAN AMERICAN: 10
GLUCOSE BLD-MCNC: 92 MG/DL (ref 70–99)
GLUCOSE BLD-MCNC: 93 MG/DL (ref 74–109)
HCT VFR BLD CALC: 32.2 % (ref 42–52)
HEMOGLOBIN: 10.8 G/DL (ref 14–18)
INR BLD: 0.95 (ref 0.88–1.18)
MCH RBC QN AUTO: 33.8 PG (ref 27–31)
MCHC RBC AUTO-ENTMCNC: 33.5 G/DL (ref 33–37)
MCV RBC AUTO: 100.6 FL (ref 80–94)
PDW BLD-RTO: 14.3 % (ref 11.5–14.5)
PERFORMED ON: NORMAL
PLATELET # BLD: 101 K/UL (ref 130–400)
PMV BLD AUTO: 11.3 FL (ref 9.4–12.4)
POTASSIUM SERPL-SCNC: 3.6 MMOL/L (ref 3.5–4.9)
PROTHROMBIN TIME: 12.6 SEC (ref 12–14.6)
RBC # BLD: 3.2 M/UL (ref 4.7–6.1)
SODIUM BLD-SCNC: 144 MMOL/L (ref 136–145)
TOTAL PROTEIN: 5.1 G/DL (ref 6.6–8.7)
WBC # BLD: 5.4 K/UL (ref 4.8–10.8)

## 2020-03-02 PROCEDURE — 2580000003 HC RX 258: Performed by: ANESTHESIOLOGY

## 2020-03-02 PROCEDURE — 80053 COMPREHEN METABOLIC PANEL: CPT

## 2020-03-02 PROCEDURE — 3700000001 HC ADD 15 MINUTES (ANESTHESIA): Performed by: SURGERY

## 2020-03-02 PROCEDURE — 85610 PROTHROMBIN TIME: CPT

## 2020-03-02 PROCEDURE — 3600000004 HC SURGERY LEVEL 4 BASE: Performed by: SURGERY

## 2020-03-02 PROCEDURE — 7100000001 HC PACU RECOVERY - ADDTL 15 MIN: Performed by: SURGERY

## 2020-03-02 PROCEDURE — 36415 COLL VENOUS BLD VENIPUNCTURE: CPT

## 2020-03-02 PROCEDURE — 7100000010 HC PHASE II RECOVERY - FIRST 15 MIN: Performed by: SURGERY

## 2020-03-02 PROCEDURE — 85027 COMPLETE CBC AUTOMATED: CPT

## 2020-03-02 PROCEDURE — 2500000003 HC RX 250 WO HCPCS: Performed by: NURSE ANESTHETIST, CERTIFIED REGISTERED

## 2020-03-02 PROCEDURE — 44180 LAP ENTEROLYSIS: CPT | Performed by: SURGERY

## 2020-03-02 PROCEDURE — 6360000002 HC RX W HCPCS: Performed by: SURGERY

## 2020-03-02 PROCEDURE — 2709999900 HC NON-CHARGEABLE SUPPLY: Performed by: SURGERY

## 2020-03-02 PROCEDURE — 2580000003 HC RX 258: Performed by: SURGERY

## 2020-03-02 PROCEDURE — 3700000000 HC ANESTHESIA ATTENDED CARE: Performed by: SURGERY

## 2020-03-02 PROCEDURE — 2500000003 HC RX 250 WO HCPCS: Performed by: SURGERY

## 2020-03-02 PROCEDURE — 6360000002 HC RX W HCPCS: Performed by: NURSE ANESTHETIST, CERTIFIED REGISTERED

## 2020-03-02 PROCEDURE — 3600000014 HC SURGERY LEVEL 4 ADDTL 15MIN: Performed by: SURGERY

## 2020-03-02 PROCEDURE — C1750 CATH, HEMODIALYSIS,LONG-TERM: HCPCS | Performed by: SURGERY

## 2020-03-02 PROCEDURE — 7100000000 HC PACU RECOVERY - FIRST 15 MIN: Performed by: SURGERY

## 2020-03-02 PROCEDURE — 6370000000 HC RX 637 (ALT 250 FOR IP): Performed by: ANESTHESIOLOGY

## 2020-03-02 PROCEDURE — 82947 ASSAY GLUCOSE BLOOD QUANT: CPT

## 2020-03-02 PROCEDURE — 85730 THROMBOPLASTIN TIME PARTIAL: CPT

## 2020-03-02 RX ORDER — HYDROCODONE BITARTRATE AND ACETAMINOPHEN 5; 325 MG/1; MG/1
2 TABLET ORAL PRN
Status: DISCONTINUED | OUTPATIENT
Start: 2020-03-02 | End: 2020-03-02 | Stop reason: HOSPADM

## 2020-03-02 RX ORDER — MEPERIDINE HYDROCHLORIDE 50 MG/ML
12.5 INJECTION INTRAMUSCULAR; INTRAVENOUS; SUBCUTANEOUS EVERY 5 MIN PRN
Status: DISCONTINUED | OUTPATIENT
Start: 2020-03-02 | End: 2020-03-02 | Stop reason: HOSPADM

## 2020-03-02 RX ORDER — HYDROCODONE BITARTRATE AND ACETAMINOPHEN 5; 325 MG/1; MG/1
1 TABLET ORAL EVERY 4 HOURS PRN
Qty: 15 TABLET | Refills: 0 | Status: SHIPPED | OUTPATIENT
Start: 2020-03-02 | End: 2020-03-07

## 2020-03-02 RX ORDER — ONDANSETRON 2 MG/ML
INJECTION INTRAMUSCULAR; INTRAVENOUS PRN
Status: DISCONTINUED | OUTPATIENT
Start: 2020-03-02 | End: 2020-03-02 | Stop reason: SDUPTHER

## 2020-03-02 RX ORDER — LABETALOL 20 MG/4 ML (5 MG/ML) INTRAVENOUS SYRINGE
5 EVERY 10 MIN PRN
Status: DISCONTINUED | OUTPATIENT
Start: 2020-03-02 | End: 2020-03-02 | Stop reason: HOSPADM

## 2020-03-02 RX ORDER — MORPHINE SULFATE 4 MG/ML
4 INJECTION, SOLUTION INTRAMUSCULAR; INTRAVENOUS EVERY 5 MIN PRN
Status: DISCONTINUED | OUTPATIENT
Start: 2020-03-02 | End: 2020-03-02 | Stop reason: HOSPADM

## 2020-03-02 RX ORDER — FENTANYL CITRATE 50 UG/ML
50 INJECTION, SOLUTION INTRAMUSCULAR; INTRAVENOUS
Status: DISCONTINUED | OUTPATIENT
Start: 2020-03-02 | End: 2020-03-02 | Stop reason: HOSPADM

## 2020-03-02 RX ORDER — SODIUM CHLORIDE 0.9 % (FLUSH) 0.9 %
10 SYRINGE (ML) INJECTION PRN
Status: DISCONTINUED | OUTPATIENT
Start: 2020-03-02 | End: 2020-03-02 | Stop reason: HOSPADM

## 2020-03-02 RX ORDER — PROPOFOL 10 MG/ML
INJECTION, EMULSION INTRAVENOUS PRN
Status: DISCONTINUED | OUTPATIENT
Start: 2020-03-02 | End: 2020-03-02 | Stop reason: SDUPTHER

## 2020-03-02 RX ORDER — ROCURONIUM BROMIDE 10 MG/ML
INJECTION, SOLUTION INTRAVENOUS PRN
Status: DISCONTINUED | OUTPATIENT
Start: 2020-03-02 | End: 2020-03-02 | Stop reason: SDUPTHER

## 2020-03-02 RX ORDER — MORPHINE SULFATE 4 MG/ML
4 INJECTION, SOLUTION INTRAMUSCULAR; INTRAVENOUS
Status: DISCONTINUED | OUTPATIENT
Start: 2020-03-02 | End: 2020-03-02 | Stop reason: HOSPADM

## 2020-03-02 RX ORDER — BUPIVACAINE HYDROCHLORIDE 2.5 MG/ML
INJECTION, SOLUTION INFILTRATION; PERINEURAL PRN
Status: DISCONTINUED | OUTPATIENT
Start: 2020-03-02 | End: 2020-03-02 | Stop reason: ALTCHOICE

## 2020-03-02 RX ORDER — SODIUM CHLORIDE 450 MG/100ML
INJECTION, SOLUTION INTRAVENOUS CONTINUOUS
Status: DISCONTINUED | OUTPATIENT
Start: 2020-03-02 | End: 2020-03-02 | Stop reason: HOSPADM

## 2020-03-02 RX ORDER — METOCLOPRAMIDE HYDROCHLORIDE 5 MG/ML
10 INJECTION INTRAMUSCULAR; INTRAVENOUS
Status: DISCONTINUED | OUTPATIENT
Start: 2020-03-02 | End: 2020-03-02 | Stop reason: HOSPADM

## 2020-03-02 RX ORDER — HYDRALAZINE HYDROCHLORIDE 20 MG/ML
5 INJECTION INTRAMUSCULAR; INTRAVENOUS EVERY 10 MIN PRN
Status: DISCONTINUED | OUTPATIENT
Start: 2020-03-02 | End: 2020-03-02 | Stop reason: HOSPADM

## 2020-03-02 RX ORDER — MORPHINE SULFATE 4 MG/ML
2 INJECTION, SOLUTION INTRAMUSCULAR; INTRAVENOUS EVERY 5 MIN PRN
Status: DISCONTINUED | OUTPATIENT
Start: 2020-03-02 | End: 2020-03-02 | Stop reason: HOSPADM

## 2020-03-02 RX ORDER — SODIUM CHLORIDE 0.9 % (FLUSH) 0.9 %
10 SYRINGE (ML) INJECTION EVERY 12 HOURS SCHEDULED
Status: DISCONTINUED | OUTPATIENT
Start: 2020-03-02 | End: 2020-03-02 | Stop reason: HOSPADM

## 2020-03-02 RX ORDER — LIDOCAINE HYDROCHLORIDE 10 MG/ML
INJECTION, SOLUTION INFILTRATION; PERINEURAL PRN
Status: DISCONTINUED | OUTPATIENT
Start: 2020-03-02 | End: 2020-03-02 | Stop reason: SDUPTHER

## 2020-03-02 RX ORDER — MIDAZOLAM HYDROCHLORIDE 1 MG/ML
2 INJECTION INTRAMUSCULAR; INTRAVENOUS
Status: DISCONTINUED | OUTPATIENT
Start: 2020-03-02 | End: 2020-03-02 | Stop reason: HOSPADM

## 2020-03-02 RX ORDER — LIDOCAINE HYDROCHLORIDE 10 MG/ML
1 INJECTION, SOLUTION EPIDURAL; INFILTRATION; INTRACAUDAL; PERINEURAL
Status: DISCONTINUED | OUTPATIENT
Start: 2020-03-02 | End: 2020-03-02 | Stop reason: HOSPADM

## 2020-03-02 RX ORDER — FENTANYL CITRATE 50 UG/ML
INJECTION, SOLUTION INTRAMUSCULAR; INTRAVENOUS PRN
Status: DISCONTINUED | OUTPATIENT
Start: 2020-03-02 | End: 2020-03-02 | Stop reason: SDUPTHER

## 2020-03-02 RX ORDER — HYDROCODONE BITARTRATE AND ACETAMINOPHEN 5; 325 MG/1; MG/1
1 TABLET ORAL PRN
Status: DISCONTINUED | OUTPATIENT
Start: 2020-03-02 | End: 2020-03-02 | Stop reason: HOSPADM

## 2020-03-02 RX ORDER — DIPHENHYDRAMINE HYDROCHLORIDE 50 MG/ML
12.5 INJECTION INTRAMUSCULAR; INTRAVENOUS
Status: DISCONTINUED | OUTPATIENT
Start: 2020-03-02 | End: 2020-03-02 | Stop reason: HOSPADM

## 2020-03-02 RX ORDER — GLYCOPYRROLATE 0.2 MG/ML
INJECTION INTRAMUSCULAR; INTRAVENOUS PRN
Status: DISCONTINUED | OUTPATIENT
Start: 2020-03-02 | End: 2020-03-02 | Stop reason: SDUPTHER

## 2020-03-02 RX ORDER — SODIUM CHLORIDE, SODIUM LACTATE, POTASSIUM CHLORIDE, CALCIUM CHLORIDE 600; 310; 30; 20 MG/100ML; MG/100ML; MG/100ML; MG/100ML
INJECTION, SOLUTION INTRAVENOUS CONTINUOUS
Status: DISCONTINUED | OUTPATIENT
Start: 2020-03-02 | End: 2020-03-02 | Stop reason: HOSPADM

## 2020-03-02 RX ORDER — SCOLOPAMINE TRANSDERMAL SYSTEM 1 MG/1
1 PATCH, EXTENDED RELEASE TRANSDERMAL ONCE
Status: DISCONTINUED | OUTPATIENT
Start: 2020-03-02 | End: 2020-03-02 | Stop reason: HOSPADM

## 2020-03-02 RX ORDER — PROMETHAZINE HYDROCHLORIDE 25 MG/ML
6.25 INJECTION, SOLUTION INTRAMUSCULAR; INTRAVENOUS
Status: DISCONTINUED | OUTPATIENT
Start: 2020-03-02 | End: 2020-03-02 | Stop reason: HOSPADM

## 2020-03-02 RX ADMIN — GLYCOPYRROLATE 0.6 MG: 1 INJECTION INTRAMUSCULAR; INTRAVENOUS at 16:15

## 2020-03-02 RX ADMIN — SODIUM CHLORIDE, SODIUM LACTATE, POTASSIUM CHLORIDE, AND CALCIUM CHLORIDE: 600; 310; 30; 20 INJECTION, SOLUTION INTRAVENOUS at 15:38

## 2020-03-02 RX ADMIN — SODIUM CHLORIDE: 4.5 INJECTION, SOLUTION INTRAVENOUS at 14:21

## 2020-03-02 RX ADMIN — LIDOCAINE HYDROCHLORIDE 5 ML: 10 INJECTION, SOLUTION INFILTRATION; PERINEURAL at 15:42

## 2020-03-02 RX ADMIN — ONDANSETRON HYDROCHLORIDE 4 MG: 2 INJECTION, SOLUTION INTRAMUSCULAR; INTRAVENOUS at 16:17

## 2020-03-02 RX ADMIN — PROPOFOL 150 MG: 10 INJECTION, EMULSION INTRAVENOUS at 15:43

## 2020-03-02 RX ADMIN — WATER 1 G: 1 INJECTION INTRAMUSCULAR; INTRAVENOUS; SUBCUTANEOUS at 15:49

## 2020-03-02 RX ADMIN — FENTANYL CITRATE 50 MCG: 50 INJECTION INTRAMUSCULAR; INTRAVENOUS at 15:42

## 2020-03-02 RX ADMIN — NEOSTIGMINE METHYLSULFATE 4 MG: 1 INJECTION, SOLUTION INTRAMUSCULAR; INTRAVENOUS; SUBCUTANEOUS at 16:15

## 2020-03-02 RX ADMIN — ROCURONIUM BROMIDE 30 MG: 10 SOLUTION INTRAVENOUS at 15:43

## 2020-03-02 RX ADMIN — FENTANYL CITRATE 50 MCG: 50 INJECTION INTRAMUSCULAR; INTRAVENOUS at 16:09

## 2020-03-02 ASSESSMENT — LIFESTYLE VARIABLES: SMOKING_STATUS: 0

## 2020-03-02 ASSESSMENT — ENCOUNTER SYMPTOMS: SHORTNESS OF BREATH: 0

## 2020-03-02 ASSESSMENT — PAIN SCALES - GENERAL: PAINLEVEL_OUTOF10: 0

## 2020-03-02 NOTE — H&P
COLONOSCOPY  02/22/2013    Dr Silas Joe yr recall   [address] GRAFT  2004    EGD  2015    DR [address] Right     HERNIA REPAIR  5822?    umbilical hernia repair    KNEE ARTHROSCOPY Left     LAPAROSCOPY INSERTION PERITONEAL CATHETER N/A 5/26/2017    LAPAROSCOPIC INSERTION PERITONEAL DIALYSIS CATHETER WITH OMENTOPEXY performed by Felix Yun MD at [facility] N/A 7/17/2017    DIAGNOSTIC LAPAROSCOPY performed by Felix Yun MD at [address]      also pacemaker replacement    NM COLONOSCOPY FLX DX W/COLLJ SPEC WHEN PFRMD N/A 11/13/2018    Dr Dominick Melo-Serrated HP x 1, HP x 2, RANDOM COLON BX NEGATIVE--3 yr recall    NM EGD TRANSORAL BIOPSY SINGLE/MULTIPLE N/A 11/13/2018    Dr Dominick Melo-1cm round lesion in antrum, questionable polyp vs mass in duodenum--Repeat in 3 months (2/15/19)    UPPER GASTROINTESTINAL ENDOSCOPY N/A 2/15/2019    Dr HAILEY Melo-Duodenitis, gastritis     No current facility-administered medications on file prior to encounter. Current Outpatient Medications on File Prior to Encounter   Medication Sig Dispense Refill    B COMPLEX-C-FOLIC ACID ER PO Take 628 mg by mouth daily       calcitRIOL (ROCALTROL) 0.25 MCG capsule Take 0.25 mcg by mouth daily      POTASSIUM CHLORIDE ER PO Take 25 mEq by mouth daily      allopurinol (ZYLOPRIM) 100 MG tablet 100 mg 2 times daily Indications: Arthritis       carvedilol (COREG) 12.5 MG tablet Take 12.5 mg by mouth 2 times daily (with meals) Indications: High Blood Pressure       acetaminophen (TYLENOL) 500 MG tablet Take 500 mg by mouth every 6 hours as needed for Pain      gentamicin (GARAMYCIN) 0.1 % cream Apply topically daily Apply topically 3 times daily.       Ergocalciferol (VITAMIN D2 PO) Take 50,000 Units by mouth once a week Indications: 50, 000 units Tuesday      bumetanide (BUMEX) 2 MG tablet Take 2 mg by mouth daily       fluticasone-salmeterol (ADVAIR) 100-50 MCG/DOSE diskus inhaler Inhale 1 puff into the lungs 2 times daily as needed       amLODIPine (NORVASC) 10 MG tablet Take 5 mg by mouth daily       SITagliptin (JANUVIA) 25 MG tablet Take 50 mg by mouth daily Indications: Diabetes       aspirin 81 MG tablet Take 81 mg by mouth daily       Allergies: Patient has no known allergies. Family History   Problem Relation Age of Onset    Heart Disease Mother     Heart Disease Father     Colon Cancer Sister     Esophageal Cancer Neg Hx     Liver Cancer Neg Hx     Liver Disease Neg Hx     Rectal Cancer Neg Hx     Stomach Cancer Neg Hx        Social History     Tobacco Use    Smoking status: Former Smoker     Packs/day: 1.50     Years: 50.00     Pack years: 75.00     Types: Cigarettes     Last attempt to quit: 10/26/2018     Years since quittin.3    Smokeless tobacco: Never Used   Substance Use Topics    Alcohol use: No     Review of Systems    Objective:   BP (!) 155/99   Pulse 81   Temp 98.4 °F (36.9 °C) (Temporal)   Resp 16   Ht 5' 6\" (1.676 m)   Wt 167 lb (75.8 kg)   SpO2 97%   BMI 26.95 kg/m²   No intake or output data in the 24 hours ending 20 1511  Physical Exam  Constitutional:       Appearance: He is normal weight. He is ill-appearing. HENT:      Head: Normocephalic and atraumatic. Mouth/Throat:      Mouth: Mucous membranes are moist.      Pharynx: Oropharynx is clear. No posterior oropharyngeal erythema. Eyes:      General: No scleral icterus. Extraocular Movements: Extraocular movements intact. Pupils: Pupils are equal, round, and reactive to light. Neck:      Musculoskeletal: Normal range of motion and neck supple. Cardiovascular:      Rate and Rhythm: Normal rate and regular rhythm. Heart sounds: Murmur present. Pulmonary:      Effort: Pulmonary effort is normal.      Breath sounds: Normal breath sounds. Abdominal:      Comments: The abdomen is slightly rounded but soft.   There is

## 2020-03-02 NOTE — ANESTHESIA PRE PROCEDURE
Historical Provider, MD   aspirin 81 MG tablet Take 81 mg by mouth daily    Historical Provider, MD       Current medications:    Current Facility-Administered Medications   Medication Dose Route Frequency Provider Last Rate Last Dose    0.45 % sodium chloride infusion   Intravenous Continuous Faustino Bustillos MD 50 mL/hr at 03/02/20 1421      ceFAZolin (ANCEF) 1 g in sterile water 10 mL IV syringe  1 g Intravenous Once Melodie Irizarry MD           Allergies:  No Known Allergies    Problem List:    Patient Active Problem List   Diagnosis Code    CAD (coronary artery disease) I25.10    Diabetes mellitus ([facility] [address] E11.9    Hypertension I10    Hypercholesteremia E78.00    Cigarette smoker F17.210    Pacemaker Z95.0    Chronic kidney disease (CKD), stage V ([facility] [address] N18.5    Gallstones K80.20    Anemia of chronic kidney failure N18.9, D63.1    Family history of colon cancer Z80.0    Diarrhea R19.7    Dark stools R19.5    Bilateral lower abdominal cramping R10.31, R10.32    S/P cholecystectomy Z90.49    Dysphagia R13.10    Chronic heartburn R12    Chronic nausea R11.0    Weight loss R63.4    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    Personal history of colonic polyps Z86.010    S/P endoscopy Z98.890    S/P colonoscopic polypectomy Z98.890    History of anemia due to chronic kidney disease N18.9, Z86.2    Anemia in chronic kidney disease, on chronic dialysis (HCC) N18.6, D63.1, Z99.2    Chronic kidney disease, stage 5 ([address]  N18.5    Generalized abdominal pain R10.84    ESRD (end stage renal disease) on dialysis ([facility] [address] N18.6, Z99.2    Current use of long term anticoagulation Z79.01       Past Medical History:        Diagnosis Date    CAD (coronary artery disease) 1/31/2015    sees [facility] heart group.     CHF (congestive heart failure) (HCC)     Chronic kidney disease (CKD), stage V ([address] 5/26/2017    Cigarette smoker 1/31/2015    COPD (chronic obstructive pulmonary disease) ([facility] [address]     Diabetes mellitus ([address]

## 2020-03-09 NOTE — PROGRESS NOTES
HISTORY OF PRESENT ILLNESS:  Kayla Doran 11year-old male with end-stage renal disease. He is currently on peritoneal dialysis. He reports some pain with peritoneal dialysis. The pain has been moderate to severe. He states that he would like to have something done. He denies any fevers or chills. There is been no evidence of infection. I discussed with him that the PD catheter may have to be removed. He is willing to take that risk. He is aware that he may have to go on hemodialysis. Again, we discussed the risk benefits and alternatives of laparoscopic evaluation with possible lysis of adhesions and he wished to proceed.     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    Personal history of colonic polyps 01/16/2019    S/P endoscopy 01/16/2019    S/P colonoscopic polypectomy 01/16/2019    Family history of colon cancer 08/28/2018    Diarrhea 08/28/2018    Dark stools 08/28/2018    Right lower quadrant abdominal pain 08/28/2018    S/P cholecystectomy 08/28/2018    Dysphagia 08/28/2018    Chronic heartburn 08/28/2018    Chronic nausea 08/28/2018    Weight loss 08/28/2018    Anemia of chronic kidney failure 07/11/2018    Gallstones 12/08/2017    Chronic kidney disease (CKD), stage V ([address] 05/26/2017    CAD (coronary artery disease) 01/31/2015    Diabetes mellitus ([address] 01/31/2015    Hypertension 01/31/2015    Hypercholesteremia 01/31/2015    Cigarette smoker 01/31/2015    Pacemaker 01/31/2015     Current Outpatient Medications   Medication Sig Dispense Refill    acetaminophen (TYLENOL) 500 MG tablet Take 500 mg by mouth every 6 hours as needed for Pain      gentamicin (GARAMYCIN) 0.1 % cream Apply topically daily Apply topically 3 times daily.  Ergocalciferol (VITAMIN D2 PO) Take 50,000 Units by mouth once a week Indications: 50, 000 units Tuesday      B COMPLEX-C-FOLIC ACID ER PO Take 796 mg by mouth daily       bumetanide (BUMEX) 2 MG tablet Take 2 mg by mouth daily       calcitRIOL (ROCALTROL) 0.25 MCG capsule Take 0.25 mcg by mouth daily      POTASSIUM CHLORIDE ER PO Take 25 mEq by mouth daily      fluticasone-salmeterol (ADVAIR) 100-50 MCG/DOSE diskus inhaler Inhale 1 puff into the lungs 2 times daily as needed       allopurinol (ZYLOPRIM) 100 MG tablet 100 mg 2 times daily Indications: Arthritis       amLODIPine (NORVASC) 10 MG tablet Take 5 mg by mouth daily       carvedilol (COREG) 12.5 MG tablet Take 12.5 mg by mouth 2 times daily (with meals) Indications: High Blood Pressure       SITagliptin (JANUVIA) 25 MG tablet Take 50 mg by mouth daily Indications: Diabetes       aspirin 81 MG tablet Take 81 mg by mouth daily      apixaban (ELIQUIS) 2.5 MG TABS tablet Take 2.5 mg by mouth 2 times daily       No current facility-administered medications for this visit. Allergies: Patient has no known allergies. Past Medical History:   Diagnosis Date    CAD (coronary artery disease) 1/31/2015    sees [facility] heart group.     CHF (congestive heart failure) (HCC)     Chronic kidney disease (CKD), stage V ([facility] [address] 5/26/2017    Cigarette smoker 1/31/2015    COPD (chronic obstructive pulmonary disease) (HCC)     Diabetes mellitus ([address] 1/31/2015    Diarrhea     End stage renal disease ([facility] [address]     Hypercholesteremia 1/31/2015    Hypertension 1/31/2015    Pacemaker 1/31/2015    Peritoneal dialysis catheter dysfunction (HCC)     Sleep apnea     No MACHINE    Stomach pain     Thrombocytopenia, unspecified ([facility] [address]      Past Surgical History:   Procedure Laterality Date    CARDIAC CATHETERIZATION  2/2/15  STIVEN stent to mid RCA.  EF 45%    CARPAL TUNNEL RELEASE Right     CHOLECYSTECTOMY, LAPAROSCOPIC N/A 2017    CHOLECYSTECTOMY LAPAROSCOPIC performed by Earle Dinero MD at [facility] COLONOSCOPY  2013    Dr Amanda Henry yr recall   [address] GRAFT  2004    EGD      DR [address] Right     HERNIA REPAIR  2643?    umbilical hernia repair    KNEE ARTHROSCOPY Left     LAPAROSCOPY INSERTION PERITONEAL CATHETER N/A 2017    LAPAROSCOPIC INSERTION PERITONEAL DIALYSIS CATHETER WITH OMENTOPEXY performed by Danette Stinson MD at [address] [place] [address] N/A 2017    DIAGNOSTIC LAPAROSCOPY performed by Danette Stinson MD at [address] [place] [address] N/A 3/2/2020    DIAGNOSTIC LAPAROSCOPIC EVALUATION OF PD CATH/  LYSIS OF ADHESIONS performed by Melodie Irizarry MD at . [address]      also pacemaker replacement    GA COLONOSCOPY FLX DX W/COLLJ SPEC WHEN PFRMD N/A 2018    Dr HAILEY Melo-Serrated HP x 1, HP x 2, RANDOM COLON BX NEGATIVE--3 yr recall    GA EGD TRANSORAL BIOPSY SINGLE/MULTIPLE N/A 2018    Dr Robert Melo-1cm round lesion in antrum, questionable polyp vs mass in duodenum--Repeat in 3 months (2/15/19)    UPPER GASTROINTESTINAL ENDOSCOPY N/A 2/15/2019    Dr HAILEY Melo-Duodenitis, gastritis     Family History   Problem Relation Age of Onset    Heart Disease Mother     Heart Disease Father     Colon Cancer Sister     Esophageal Cancer Neg Hx     Liver Cancer Neg Hx     Liver Disease Neg Hx     Rectal Cancer Neg Hx     Stomach Cancer Neg Hx      Social History     Tobacco Use    Smoking status: Former Smoker     Packs/day: 1.50     Years: 50.00     Pack years: 75.00     Types: Cigarettes     Last attempt to quit: 10/26/2018     Years since quittin.3    Smokeless tobacco: Never Used   Substance Use Topics    Alcohol use: No         Review of Systems  Review of System reviewed

## 2020-03-16 ENCOUNTER — OFFICE VISIT (OUTPATIENT)
Dept: SURGERY | Age: 70
End: 2020-03-16

## 2020-03-16 VITALS
SYSTOLIC BLOOD PRESSURE: 104 MMHG | TEMPERATURE: 97.6 F | HEIGHT: 66 IN | WEIGHT: 166.4 LBS | BODY MASS INDEX: 26.74 KG/M2 | DIASTOLIC BLOOD PRESSURE: 64 MMHG

## 2020-03-16 PROCEDURE — 99024 POSTOP FOLLOW-UP VISIT: CPT | Performed by: PHYSICIAN ASSISTANT

## 2020-03-18 NOTE — PROGRESS NOTES
temperature 97.6 °F (36.4 °C), temperature source Temporal, height 5' 6\" (1.676 m), weight 166 lb 6.4 oz (75.5 kg). On examination, his incisions are healing well. He does have a right inguinal hernia that is reducible. He has mild pain to palpation    IMPRESSION:    Status post laparoscopic lysis of adhesions, doing well  Right inguinal hernia without evidence of strangulation or incarceration   End-stage renal disease    RECOMMENDATION:  We will see Mr. Annamaria Mclaughlin  back in the office in a couple months and discuss inguinal hernia surgery. We have discussed the signs and symptoms of strangulation or incarceration of the hernia. 15 minutes of face-to-face encounter was conducted on this interview. The majority of this encounter was counseling.

## 2020-04-03 ENCOUNTER — TELEPHONE (OUTPATIENT)
Dept: GASTROENTEROLOGY | Age: 70
End: 2020-04-03

## 2020-04-05 ENCOUNTER — APPOINTMENT (OUTPATIENT)
Dept: GENERAL RADIOLOGY | Age: 70
End: 2020-04-05
Payer: MEDICARE

## 2020-04-05 ENCOUNTER — HOSPITAL ENCOUNTER (EMERGENCY)
Age: 70
Discharge: HOME | End: 2020-04-05
Attending: EMERGENCY MEDICINE
Payer: MEDICARE

## 2020-04-05 VITALS
DIASTOLIC BLOOD PRESSURE: 92 MMHG | TEMPERATURE: 98.6 F | HEART RATE: 69 BPM | OXYGEN SATURATION: 97 % | SYSTOLIC BLOOD PRESSURE: 148 MMHG | HEIGHT: 65 IN | BODY MASS INDEX: 29.16 KG/M2 | RESPIRATION RATE: 18 BRPM | WEIGHT: 175 LBS

## 2020-04-05 LAB
BACTERIA: NEGATIVE /HPF
BILIRUBIN URINE: NEGATIVE
BLOOD, URINE: NEGATIVE
CLARITY: CLEAR
COLOR: YELLOW
EPITHELIAL CELLS, UA: 1 /HPF (ref 0–5)
GLUCOSE URINE: NEGATIVE MG/DL
HYALINE CASTS: 4 /HPF (ref 0–8)
KETONES, URINE: NEGATIVE MG/DL
LEUKOCYTE ESTERASE, URINE: NEGATIVE
NITRITE, URINE: NEGATIVE
PH UA: 6 (ref 5–8)
PROTEIN UA: 30 MG/DL
RBC UA: 1 /HPF (ref 0–4)
SPECIFIC GRAVITY UA: 1.02 (ref 1–1.03)
URINE REFLEX TO CULTURE: ABNORMAL
UROBILINOGEN, URINE: 0.2 E.U./DL
WBC UA: 2 /HPF (ref 0–5)

## 2020-04-05 PROCEDURE — 81001 URINALYSIS AUTO W/SCOPE: CPT

## 2020-04-05 PROCEDURE — 6370000000 HC RX 637 (ALT 250 FOR IP): Performed by: EMERGENCY MEDICINE

## 2020-04-05 PROCEDURE — 71046 X-RAY EXAM CHEST 2 VIEWS: CPT

## 2020-04-05 PROCEDURE — 99283 EMERGENCY DEPT VISIT LOW MDM: CPT

## 2020-04-05 RX ORDER — LIDOCAINE 4 G/G
1 PATCH TOPICAL ONCE
Status: DISCONTINUED | OUTPATIENT
Start: 2020-04-05 | End: 2020-04-05 | Stop reason: HOSPADM

## 2020-04-05 RX ORDER — HYDROCODONE BITARTRATE AND ACETAMINOPHEN 5; 325 MG/1; MG/1
1 TABLET ORAL EVERY 6 HOURS PRN
Qty: 12 TABLET | Refills: 0 | Status: SHIPPED | OUTPATIENT
Start: 2020-04-05 | End: 2020-04-08

## 2020-04-05 ASSESSMENT — ENCOUNTER SYMPTOMS
BACK PAIN: 1
COUGH: 0
SHORTNESS OF BREATH: 0
VOMITING: 0
ABDOMINAL PAIN: 0
NAUSEA: 0

## 2020-04-05 ASSESSMENT — PAIN DESCRIPTION - ORIENTATION: ORIENTATION: MID

## 2020-04-05 ASSESSMENT — PAIN DESCRIPTION - LOCATION: LOCATION: BACK

## 2020-04-05 ASSESSMENT — PAIN SCALES - GENERAL: PAINLEVEL_OUTOF10: 4

## 2020-04-05 NOTE — ED PROVIDER NOTES
TABLET    100 mg 2 times daily Indications: Arthritis     AMLODIPINE (NORVASC) 10 MG TABLET    Take 5 mg by mouth daily     APIXABAN (ELIQUIS) 2.5 MG TABS TABLET    Take 2.5 mg by mouth 2 times daily    ASPIRIN 81 MG TABLET    Take 81 mg by mouth daily    B COMPLEX-C-FOLIC ACID ER PO    Take 800 mg by mouth daily     BUMETANIDE (BUMEX) 2 MG TABLET    Take 2 mg by mouth daily     CALCITRIOL (ROCALTROL) 0.25 MCG CAPSULE    Take 0.25 mcg by mouth daily    CARVEDILOL (COREG) 12.5 MG TABLET    Take 12.5 mg by mouth 2 times daily (with meals) Indications: High Blood Pressure     ERGOCALCIFEROL (VITAMIN D2 PO)    Take 50,000 Units by mouth once a week Indications: 50, 000 units Tuesday    FLUTICASONE-SALMETEROL (ADVAIR) 100-50 MCG/DOSE DISKUS INHALER    Inhale 1 puff into the lungs 2 times daily as needed     GENTAMICIN (GARAMYCIN) 0.1 % CREAM    Apply topically daily Apply topically 3 times daily. POTASSIUM CHLORIDE ER PO    Take 25 mEq by mouth daily    SITAGLIPTIN (JANUVIA) 25 MG TABLET    Take 50 mg by mouth daily Indications: Diabetes        ALLERGIES     Patient has no known allergies.     FAMILY HISTORY       Family History   Problem Relation Age of Onset    Heart Disease Mother     Heart Disease Father     Colon Cancer Sister     Esophageal Cancer Neg Hx     Liver Cancer Neg Hx     Liver Disease Neg Hx     Rectal Cancer Neg Hx     Stomach Cancer Neg Hx           SOCIAL HISTORY       Social History     Socioeconomic History    Marital status:      Spouse name: None    Number of children: None    Years of education: None    Highest education level: None   Occupational History    None   Social Needs    Financial resource strain: None    Food insecurity     Worry: None     Inability: None    Transportation needs     Medical: None     Non-medical: None   Tobacco Use    Smoking status: Former Smoker     Packs/day: 1.50     Years: 50.00     Pack years: 75.00     Types: Cigarettes     Last attempt tolerance/dependence & alternative treatments discussed., No signs of potential drug abuse or diversion identified.  Valeria Renteria MD)    (Please note that portions of this note were completed with a voice recognition program.  Efforts were made to edit the dictations butoccasionally words are mis-transcribed.)    Valeria Renteria MD (electronically signed)  AttendingEmergency Physician         Valeria Renteria MD  04/05/20 [id number]

## 2020-04-10 NOTE — TELEPHONE ENCOUNTER
Received paper refill request today from patient's pharmacy for the following:    Requested Prescriptions     Pending Prescriptions Disp Refills    omeprazole (PRILOSEC) 40 MG delayed release capsule 30 capsule 3     Sig: Take 1 capsule by mouth daily Take first thing daily on an empty stomach. Last written 10/8/19, last filled 3/13/20. Last office visit with LOIDA Velazco on 2/18/20. PT has since had diagnostic laparoscopy with lysis of adhesion with Dr Tulio Alvarez on 3/2/20, also noted he had a right inguinal hernia. PT is to follow up with them to discuss hernia surgery. We did get clearance for his scope with Dr Unique Cooper, looks like it was r/s to May? He has current labs in [address] [place] [address] Rd. Will send to [facility] to advise refills.

## 2020-04-15 RX ORDER — OMEPRAZOLE 40 MG/1
40 CAPSULE, DELAYED RELEASE ORAL DAILY
Qty: 30 CAPSULE | Refills: 3 | OUTPATIENT
Start: 2020-04-15

## 2020-04-20 NOTE — TELEPHONE ENCOUNTER
RN contacted patient to see if he was willing to send in a remote report instead of having his scheduled in office pacemaker appointment on 4/28/20.  Patient's wife states that they have tried to use a remote monitor and that it did not work at their home.  Patient reports having no issues at this time.  RN will reschedule patient's appointment to June and will mail patient a reminder.  Patient instructed to contact the [place] office if any new symptoms or issues develop in the interim.  Understanding verbalized.

## 2020-05-08 RX ORDER — OMEPRAZOLE 40 MG/1
40 CAPSULE, DELAYED RELEASE ORAL DAILY
Qty: 30 CAPSULE | Refills: 3 | Status: SHIPPED | OUTPATIENT
Start: 2020-05-08 | End: 2020-08-28 | Stop reason: SDUPTHER

## 2020-05-08 NOTE — TELEPHONE ENCOUNTER
Mandeep Soto,    Paper fax refill request today from patient's pharmacy for Omeprazole 40 mg, says last filled 5/6/20. I see when this was sent to you before last month, you said you filled it in March with refills, but I am not seeing that. I will scan this in. Last office visit with LOIDA Dawson on 2/18/20. PT has since had diagnostic laparoscopy with lysis of adhesion with Dr Ann Oneal on 3/2/20, also noted he had a right inguinal hernia. PT is to follow up with them to discuss hernia surgery. We did get clearance for his scope with Dr Kelly Bertrand, looks like it was r/s to May?  He has current labs in [address] [place] [address] Rd.

## 2020-05-10 ENCOUNTER — OFFICE VISIT (OUTPATIENT)
Age: 70
End: 2020-05-10
Payer: MEDICARE

## 2020-05-10 VITALS
BODY MASS INDEX: 28.93 KG/M2 | OXYGEN SATURATION: 98 % | TEMPERATURE: 98.3 F | SYSTOLIC BLOOD PRESSURE: 126 MMHG | DIASTOLIC BLOOD PRESSURE: 82 MMHG | HEIGHT: 66 IN | WEIGHT: 180 LBS | HEART RATE: 65 BPM

## 2020-05-10 PROCEDURE — 99213 OFFICE O/P EST LOW 20 MIN: CPT | Performed by: NURSE PRACTITIONER

## 2020-05-10 ASSESSMENT — ENCOUNTER SYMPTOMS
SHORTNESS OF BREATH: 0
VOMITING: 0
ABDOMINAL PAIN: 0
COUGH: 0
NAUSEA: 0
EYES NEGATIVE: 1
DIARRHEA: 0
SINUS PRESSURE: 0
SORE THROAT: 0

## 2020-05-10 ASSESSMENT — VISUAL ACUITY: OU: 1

## 2020-05-10 NOTE — PROGRESS NOTES
for Pain      gentamicin (GARAMYCIN) 0.1 % cream Apply topically daily Apply topically 3 times daily.  Ergocalciferol (VITAMIN D2 PO) Take 50,000 Units by mouth once a week Indications: 50, 000 units Tuesday      B COMPLEX-C-FOLIC ACID ER PO Take 477 mg by mouth daily       bumetanide (BUMEX) 2 MG tablet Take 2 mg by mouth daily       calcitRIOL (ROCALTROL) 0.25 MCG capsule Take 0.25 mcg by mouth daily      POTASSIUM CHLORIDE ER PO Take 25 mEq by mouth daily      fluticasone-salmeterol (ADVAIR) 100-50 MCG/DOSE diskus inhaler Inhale 1 puff into the lungs 2 times daily as needed       allopurinol (ZYLOPRIM) 100 MG tablet 100 mg 2 times daily Indications: Arthritis       amLODIPine (NORVASC) 10 MG tablet Take 5 mg by mouth daily       carvedilol (COREG) 12.5 MG tablet Take 12.5 mg by mouth 2 times daily (with meals) Indications: High Blood Pressure       SITagliptin (JANUVIA) 25 MG tablet Take 50 mg by mouth daily Indications: Diabetes       aspirin 81 MG tablet Take 81 mg by mouth daily       No current facility-administered medications for this visit.       No Known Allergies    Health Maintenance   Topic Date Due    Hepatitis C screen  1950    Diabetic foot exam  05/23/1962    A1C test (Diabetic or Prediabetic)  05/23/1962    Diabetic retinal exam  05/23/1962    DTaP/Tdap/Td vaccine (1 - Tdap) 05/23/1971    Shingles Vaccine (1 of 2) 05/23/2002    Low dose CT lung screening  05/23/2007    Lipid screen  01/31/2016    Pneumococcal 65+ yrs at Risk Vaccine (2 of 2 - PPSV23) 10/15/2018    Annual Wellness Visit (AWV)  02/18/2020    Flu vaccine (Season Ended) 09/01/2020    Potassium monitoring  03/02/2021    Creatinine monitoring  03/02/2021    Colon cancer screen colonoscopy  11/13/2021    AAA screen  Completed    Hepatitis A vaccine  Aged Out    Hib vaccine  Aged Out    Meningococcal (ACWY) vaccine  Aged Out       Subjective:      Review of Systems   Constitutional: Positive for seconds. Coloration: Skin is ashen and sallow. Neurological:      General: No focal deficit present. Mental Status: He is alert, oriented to person, place, and time and easily aroused. Mental status is at baseline. Psychiatric:         Attention and Perception: Attention normal.         Mood and Affect: Mood normal.         Speech: Speech normal.         Behavior: Behavior normal. Behavior is cooperative. /82   Pulse 65   Temp 98.3 °F (36.8 °C)   Ht 5' 6\" (1.676 m)   Wt 180 lb (81.6 kg)   SpO2 98%   BMI 29.05 kg/m²   No results found for this visit on 05/10/20. Assessment/ Plan     ASSESSMENT:         Diagnosis Orders   1. Screening for viral disease  COVID-19       PLAN:   Pt was screened for COVID for pre-op testing. He was advised to stay quarantined until procedure. No follow-ups on file. Patient given educational materials - see patient instructions. Discussed use, benefit, and side effects of prescribed medications. All patient questions answered. Pt voiced understanding. Patient agreed with treatment plan.  Follow up as needed      Electronically signed by LOIDA Gallegos CNP on 5/10/2020 at 11:41 AM

## 2020-05-12 LAB
REPORT: NORMAL
SARS-COV-2: NOT DETECTED
THIS TEST SENT TO: NORMAL

## 2020-05-13 ENCOUNTER — ANESTHESIA (OUTPATIENT)
Dept: ENDOSCOPY | Age: 70
End: 2020-05-13
Payer: MEDICARE

## 2020-05-13 ENCOUNTER — HOSPITAL ENCOUNTER (OUTPATIENT)
Age: 70
Setting detail: OUTPATIENT SURGERY
Discharge: HOME | End: 2020-05-13
Attending: INTERNAL MEDICINE | Admitting: INTERNAL MEDICINE
Payer: MEDICARE

## 2020-05-13 ENCOUNTER — ANESTHESIA EVENT (OUTPATIENT)
Dept: ENDOSCOPY | Age: 70
End: 2020-05-13
Payer: MEDICARE

## 2020-05-13 VITALS
WEIGHT: 165 LBS | BODY MASS INDEX: 26.52 KG/M2 | DIASTOLIC BLOOD PRESSURE: 68 MMHG | HEIGHT: 66 IN | OXYGEN SATURATION: 98 % | TEMPERATURE: 99 F | RESPIRATION RATE: 18 BRPM | HEART RATE: 60 BPM | SYSTOLIC BLOOD PRESSURE: 132 MMHG

## 2020-05-13 VITALS
OXYGEN SATURATION: 97 % | RESPIRATION RATE: 19 BRPM | DIASTOLIC BLOOD PRESSURE: 75 MMHG | SYSTOLIC BLOOD PRESSURE: 121 MMHG

## 2020-05-13 LAB
ANION GAP SERPL CALCULATED.3IONS-SCNC: 12 MMOL/L (ref 7–19)
BUN BLDV-MCNC: 34 MG/DL (ref 8–23)
CALCIUM SERPL-MCNC: 9.7 MG/DL (ref 8.8–10.2)
CHLORIDE BLD-SCNC: 105 MMOL/L (ref 98–111)
CO2: 29 MMOL/L (ref 22–29)
CREAT SERPL-MCNC: 5.1 MG/DL (ref 0.5–1.2)
GFR NON-AFRICAN AMERICAN: 11
GLUCOSE BLD-MCNC: 88 MG/DL (ref 74–109)
POTASSIUM SERPL-SCNC: 3.8 MMOL/L (ref 3.5–5)
SODIUM BLD-SCNC: 146 MMOL/L (ref 136–145)

## 2020-05-13 PROCEDURE — 3700000000 HC ANESTHESIA ATTENDED CARE: Performed by: INTERNAL MEDICINE

## 2020-05-13 PROCEDURE — 2709999900 HC NON-CHARGEABLE SUPPLY: Performed by: INTERNAL MEDICINE

## 2020-05-13 PROCEDURE — 6360000002 HC RX W HCPCS: Performed by: NURSE ANESTHETIST, CERTIFIED REGISTERED

## 2020-05-13 PROCEDURE — 7100000010 HC PHASE II RECOVERY - FIRST 15 MIN: Performed by: INTERNAL MEDICINE

## 2020-05-13 PROCEDURE — 45380 COLONOSCOPY AND BIOPSY: CPT | Performed by: INTERNAL MEDICINE

## 2020-05-13 PROCEDURE — 36415 COLL VENOUS BLD VENIPUNCTURE: CPT

## 2020-05-13 PROCEDURE — 3700000001 HC ADD 15 MINUTES (ANESTHESIA): Performed by: INTERNAL MEDICINE

## 2020-05-13 PROCEDURE — 3609027000 HC COLONOSCOPY: Performed by: INTERNAL MEDICINE

## 2020-05-13 PROCEDURE — 2580000003 HC RX 258: Performed by: INTERNAL MEDICINE

## 2020-05-13 PROCEDURE — 2580000003 HC RX 258: Performed by: NURSE ANESTHETIST, CERTIFIED REGISTERED

## 2020-05-13 PROCEDURE — 7100000011 HC PHASE II RECOVERY - ADDTL 15 MIN: Performed by: INTERNAL MEDICINE

## 2020-05-13 PROCEDURE — 2500000003 HC RX 250 WO HCPCS: Performed by: NURSE ANESTHETIST, CERTIFIED REGISTERED

## 2020-05-13 PROCEDURE — 43239 EGD BIOPSY SINGLE/MULTIPLE: CPT | Performed by: INTERNAL MEDICINE

## 2020-05-13 PROCEDURE — 3609017100 HC EGD: Performed by: INTERNAL MEDICINE

## 2020-05-13 PROCEDURE — 80048 BASIC METABOLIC PNL TOTAL CA: CPT

## 2020-05-13 RX ORDER — PROPOFOL 10 MG/ML
INJECTION, EMULSION INTRAVENOUS PRN
Status: DISCONTINUED | OUTPATIENT
Start: 2020-05-13 | End: 2020-05-13 | Stop reason: SDUPTHER

## 2020-05-13 RX ORDER — TRAZODONE HYDROCHLORIDE 50 MG/1
50 TABLET ORAL NIGHTLY
COMMUNITY

## 2020-05-13 RX ORDER — LIDOCAINE HYDROCHLORIDE 10 MG/ML
INJECTION, SOLUTION EPIDURAL; INFILTRATION; INTRACAUDAL; PERINEURAL PRN
Status: DISCONTINUED | OUTPATIENT
Start: 2020-05-13 | End: 2020-05-13 | Stop reason: SDUPTHER

## 2020-05-13 RX ORDER — PROMETHAZINE HYDROCHLORIDE 25 MG/ML
6.25 INJECTION, SOLUTION INTRAMUSCULAR; INTRAVENOUS
Status: DISCONTINUED | OUTPATIENT
Start: 2020-05-13 | End: 2020-05-13 | Stop reason: HOSPADM

## 2020-05-13 RX ORDER — SODIUM CHLORIDE 450 MG/100ML
INJECTION, SOLUTION INTRAVENOUS ONCE
Status: COMPLETED | OUTPATIENT
Start: 2020-05-13 | End: 2020-05-13

## 2020-05-13 RX ORDER — ONDANSETRON 2 MG/ML
4 INJECTION INTRAMUSCULAR; INTRAVENOUS
Status: DISCONTINUED | OUTPATIENT
Start: 2020-05-13 | End: 2020-05-13 | Stop reason: HOSPADM

## 2020-05-13 RX ORDER — SODIUM CHLORIDE 450 MG/100ML
INJECTION, SOLUTION INTRAVENOUS CONTINUOUS PRN
Status: DISCONTINUED | OUTPATIENT
Start: 2020-05-13 | End: 2020-05-13 | Stop reason: SDUPTHER

## 2020-05-13 RX ORDER — DIPHENHYDRAMINE HYDROCHLORIDE 50 MG/ML
12.5 INJECTION INTRAMUSCULAR; INTRAVENOUS
Status: DISCONTINUED | OUTPATIENT
Start: 2020-05-13 | End: 2020-05-13 | Stop reason: HOSPADM

## 2020-05-13 RX ORDER — FERRIC CITRATE 210 MG/1
TABLET, COATED ORAL DAILY
COMMUNITY

## 2020-05-13 RX ADMIN — SODIUM CHLORIDE: 4.5 INJECTION, SOLUTION INTRAVENOUS at 07:28

## 2020-05-13 RX ADMIN — LIDOCAINE HYDROCHLORIDE 5 ML: 10 INJECTION, SOLUTION EPIDURAL; INFILTRATION; INTRACAUDAL; PERINEURAL at 08:14

## 2020-05-13 RX ADMIN — SODIUM CHLORIDE: 4.5 INJECTION, SOLUTION INTRAVENOUS at 08:10

## 2020-05-13 RX ADMIN — PROPOFOL 300 MG: 10 INJECTION, EMULSION INTRAVENOUS at 08:14

## 2020-05-13 ASSESSMENT — PAIN SCALES - GENERAL
PAINLEVEL_OUTOF10: 0
PAINLEVEL_OUTOF10: 0

## 2020-05-13 ASSESSMENT — LIFESTYLE VARIABLES: SMOKING_STATUS: 0

## 2020-05-13 ASSESSMENT — ENCOUNTER SYMPTOMS: SHORTNESS OF BREATH: 0

## 2020-05-13 NOTE — ANESTHESIA PRE PROCEDURE
11/12/16   Historical Provider, MD   carvedilol (COREG) 12.5 MG tablet Take 12.5 mg by mouth 2 times daily (with meals) Indications: High Blood Pressure  10/10/16   Historical Provider, MD   SITagliptin (JANUVIA) 25 MG tablet Take 50 mg by mouth daily Indications: Diabetes  10/24/16   Historical Provider, MD   aspirin 81 MG tablet Take 81 mg by mouth daily    Historical Provider, MD       Current medications:    No current outpatient medications on file. No current facility-administered medications for this visit. Allergies:  No Known Allergies    Problem List:    Patient Active Problem List   Diagnosis Code    CAD (coronary artery disease) I25.10    Diabetes mellitus ([facility] E11.9    Hypertension I10    Hypercholesteremia E78.00    Cigarette smoker F17.210    Pacemaker Z95.0    Chronic kidney disease (CKD), stage V ([facility] N18.5    Gallstones K80.20    Anemia of chronic kidney failure N18.9, D63.1    Family history of colon cancer Z80.0    Diarrhea R19.7    Dark stools R19.5    Right lower quadrant abdominal pain R10.31    S/P cholecystectomy Z90.49    Dysphagia R13.10    Chronic heartburn R12    Chronic nausea R11.0    Weight loss R63.4    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    Personal history of colonic polyps Z86.010    S/P endoscopy Z98.890    S/P colonoscopic polypectomy Z98.890    History of anemia due to chronic kidney disease N18.9, Z86.2    Anemia in chronic kidney disease, on chronic dialysis (HCC) N18.6, D63.1, Z99.2    Chronic kidney disease, stage 5 (HCC)  N18.5    Generalized abdominal pain R10.84    ESRD (end stage renal disease) on dialysis (HCC) N18.6, Z99.2    Current use of long term anticoagulation Z79.01       Past Medical History:        Diagnosis Date    Anemia     CAD (coronary artery disease) 1/31/2015    sees [facility] heart group.     CHF (congestive heart failure) ([facility])     Chronic kidney disease (CKD), stage V ([facility] 5/26/2017    Cigarette smoker 1/31/2015    COPD years: 75.00     Types: Cigarettes     Last attempt to quit: 10/26/2018     Years since quittin.5    Smokeless tobacco: Never Used   Substance Use Topics    Alcohol use: No                                Counseling given: Not Answered      Vital Signs (Current): There were no vitals filed for this visit. BP Readings from Last 3 Encounters:   20 (!) 147/81   05/10/20 126/82   20 (!) 148/92       NPO Status:                                                                                 BMI:   Wt Readings from Last 3 Encounters:   20 165 lb (74.8 kg)   05/10/20 180 lb (81.6 kg)   20 175 lb (79.4 kg)     There is no height or weight on file to calculate BMI.    CBC:   Lab Results   Component Value Date    WBC 5.4 2020    RBC 3.20 2020    HGB 10.8 2020    HCT 32.2 2020    .6 2020    RDW 14.3 2020     2020       CMP:   Lab Results   Component Value Date     2020    K 3.6 2020     2020    CO2 26 2020    BUN 29 2020    CREATININE 5.9 2020    LABGLOM 10 2020    GLUCOSE 93 2020    PROT 5.1 2020    CALCIUM 9.2 2020    BILITOT 0.7 2020    ALKPHOS 77 2020    AST 14 2020    ALT 13 2020       POC Tests:   No results for input(s): POCGLU, POCNA, POCK, POCCL, POCBUN, POCHEMO, POCHCT in the last 72 hours.     Coags:   Lab Results   Component Value Date    PROTIME 12.6 2020    INR 0.95 2020    APTT 26.7 2020       HCG (If Applicable): No results found for: PREGTESTUR, PREGSERUM, HCG, HCGQUANT     ABGs:   Lab Results   Component Value Date    PHART 7.450 2017    PO2ART 52.0 2017    XYL3RCS 46.0 2017    KLK3FNF 32.0 2017    BEART 6.9 2017    E9ROLGEK 87.7 2017        Type & Screen (If Applicable):  No results found for: LABABO, [address]    Anesthesia Evaluation  Patient summary reviewed and Nursing notes reviewed no history of anesthetic complications:   Airway: Mallampati: II  TM distance: >3 FB   Neck ROM: full  Mouth opening: > = 3 FB Dental: normal exam   (+) edentulous      Pulmonary:Negative Pulmonary ROS and normal exam  breath sounds clear to auscultation  (+) COPD:  sleep apnea:      (-) shortness of breath and not a current smoker          Patient did not smoke on day of surgery. Cardiovascular:    (+) hypertension:, pacemaker:, CAD:, CHF:,     (-)  angina    NYHA Classification: I  ECG reviewed  Rhythm: regular  Rate: normal           Beta Blocker:  Dose within 24 Hrs         Neuro/Psych:   Negative Neuro/Psych ROS     (-) seizures, CVA and depression/anxiety            GI/Hepatic/Renal: Neg GI/Hepatic/Renal ROS  (+) renal disease: dialysis and ARF,      (-) hiatal hernia and GERD       Endo/Other: Negative Endo/Other ROS   (+) DiabetesType II DM, , blood dyscrasia (last Eliquis 2/27): anemia and anticoagulation therapy:., .          Pt had PAT visit. Abdominal:       Abdomen: soft. Vascular:                                          Anesthesia Plan      general     ASA 4     (Iv zofran within 30 min of closing )  Induction: intravenous. Anesthetic plan and risks discussed with patient.                       Nora Luna, LOIDA - CRNA   5/13/2020

## 2020-05-13 NOTE — H&P
CATHETER N/A 2017    LAPAROSCOPIC INSERTION PERITONEAL DIALYSIS CATHETER WITH OMENTOPEXY performed by Susanne Dyson MD at [address] [place] [address] N/A 2017    DIAGNOSTIC LAPAROSCOPY performed by Susanne Dyson MD at [address] [place] [address] N/A 3/2/2020    DIAGNOSTIC LAPAROSCOPIC EVALUATION OF PD CATH/  LYSIS OF ADHESIONS performed by Selvin Evans MD at [address]      also pacemaker replacement    IN COLONOSCOPY FLX DX W/COLLJ SPEC WHEN PFRMD N/A 2018    Dr Josh Melo-Serrated HP x 1, HP x 2, RANDOM COLON BX NEGATIVE--3 yr recall    IN EGD TRANSORAL BIOPSY SINGLE/MULTIPLE N/A 2018    Dr Josh Melo-1cm round lesion in antrum, questionable polyp vs mass in duodenum--Repeat in 3 months (2/15/19)    UPPER GASTROINTESTINAL ENDOSCOPY N/A 2/15/2019    Dr HAILEY Melo-Peptic duodenitis, gastritis       Social History:  Social History     Tobacco Use    Smoking status: Former Smoker     Packs/day: 1.50     Years: 50.00     Pack years: 75.00     Types: Cigarettes     Last attempt to quit: 10/26/2018     Years since quittin.5    Smokeless tobacco: Never Used   Substance Use Topics    Alcohol use: No    Drug use: No       Vital Signs:   Vitals:    20 0721   BP: (!) 147/81   Pulse: 67   Resp: 16   Temp: 99 °F (37.2 °C)   SpO2: 99%        Physical Exam:  Cardiac:  [x]WNL  []Comments:  Pulmonary:  [x]WNL   []Comments:  Neuro/Mental Status:  [x]WNL  []Comments:  Abdominal:  [x]WNL    []Comments:  Other:   []WNL  []Comments:    Informed Consent:  The risks and benefits of the procedure have been discussed with either the patient or if they cannot consent, their representative. Assessment:  Patient examined and appropriate for planned sedation and procedure. Plan:  Proceed with planned sedation and procedure as above.          Saray Fowler MD

## 2020-05-13 NOTE — OP NOTE
colonoscope was removed from the patient, and the procedure was terminated. Findings are listed below. Findings: The mucosa appeared normal throughout the entire examined colon   Random colon biopsies obtained throughout the colon to evaluate for microscopic colitis. Retroflexion in the rectum was normal and revealed no further abnormalities         Recommendations:  1. Repeat colonoscopy: pending pathology - max of 10 yrs for screening  2. Await biopsy results-you will receive a letter with your results within 7-10 days    Findings and recommendations were discussed w/ the patient. A copy of the images was provided.     Lulu Branch MD  5/13/2020  8:53 AM

## 2020-05-28 ENCOUNTER — TELEPHONE (OUTPATIENT)
Dept: NEUROSURGERY | Age: 70
End: 2020-05-28

## 2020-05-28 ENCOUNTER — OFFICE VISIT (OUTPATIENT)
Dept: NEUROLOGY | Age: 70
End: 2020-05-28
Payer: MEDICARE

## 2020-05-28 VITALS
WEIGHT: 162 LBS | DIASTOLIC BLOOD PRESSURE: 80 MMHG | HEART RATE: 70 BPM | HEIGHT: 66 IN | BODY MASS INDEX: 26.03 KG/M2 | SYSTOLIC BLOOD PRESSURE: 139 MMHG

## 2020-05-28 DIAGNOSIS — R41.3 MEMORY LOSS: ICD-10-CM

## 2020-05-28 LAB
AMMONIA: 15 UMOL/L (ref 16–60)
T4 FREE: 1.32 NG/DL (ref 0.93–1.7)
TSH SERPL DL<=0.05 MIU/L-ACNC: 1.35 UIU/ML (ref 0.27–4.2)
VITAMIN B-12: 516 PG/ML (ref 211–946)

## 2020-05-28 PROCEDURE — 99204 OFFICE O/P NEW MOD 45 MIN: CPT | Performed by: PSYCHIATRY & NEUROLOGY

## 2020-05-28 PROCEDURE — 1123F ACP DISCUSS/DSCN MKR DOCD: CPT | Performed by: PSYCHIATRY & NEUROLOGY

## 2020-05-28 PROCEDURE — G8417 CALC BMI ABV UP PARAM F/U: HCPCS | Performed by: PSYCHIATRY & NEUROLOGY

## 2020-05-28 PROCEDURE — G8427 DOCREV CUR MEDS BY ELIG CLIN: HCPCS | Performed by: PSYCHIATRY & NEUROLOGY

## 2020-05-28 PROCEDURE — 3017F COLORECTAL CA SCREEN DOC REV: CPT | Performed by: PSYCHIATRY & NEUROLOGY

## 2020-05-28 PROCEDURE — 1036F TOBACCO NON-USER: CPT | Performed by: PSYCHIATRY & NEUROLOGY

## 2020-05-28 PROCEDURE — 4040F PNEUMOC VAC/ADMIN/RCVD: CPT | Performed by: PSYCHIATRY & NEUROLOGY

## 2020-05-28 RX ORDER — LORAZEPAM 1 MG/1
1 TABLET ORAL PRN
Qty: 2 TABLET | Refills: 0 | Status: SHIPPED | OUTPATIENT
Start: 2020-05-28 | End: 2020-06-27

## 2020-05-28 NOTE — TELEPHONE ENCOUNTER
Called Fely Ponce to inform her that the MRI that was scheduled was cancelled, due to pacemaker that MR. Kevin Gonzales has. Dr. Juan Ramon Avalos ordered a CT Scan and appointment has been made for 6-1-20 with an arrival of 12:30 for a 1:00. Left message with appointment date and time.

## 2020-05-28 NOTE — PROGRESS NOTES
chronic kidney disease, on chronic dialysis ([facility] 01/14/2020    Chronic kidney disease, stage 5 (HCC)      Generalized abdominal pain 02/18/2020    ESRD (end stage renal disease) on dialysis ([facility] 02/18/2020    Current use of long term anticoagulation 02/18/2020     Resolved Ambulatory Problems     Diagnosis Date Noted    No Resolved Ambulatory Problems     Past Medical History:   Diagnosis Date    Anemia     CHF (congestive heart failure) (HCC)     COPD (chronic obstructive pulmonary disease) (HCC)     End stage renal disease ([facility]     Peritoneal dialysis catheter dysfunction ([facility]     Sleep apnea     Stomach pain     Thrombocytopenia, unspecified ([facility]        Past Surgical History:   Procedure Laterality Date    CARDIAC CATHETERIZATION  2/2/15  JDT    stent to mid RCA.  EF 45%    CARPAL TUNNEL RELEASE Right     CHOLECYSTECTOMY, LAPAROSCOPIC N/A 12/8/2017    CHOLECYSTECTOMY LAPAROSCOPIC performed by Laci Hoffmann MD at [address] [place] [address]  02/22/2013    Dr Allyssa Figueroa yr recall    COLONOSCOPY N/A 5/13/2020    Dr Tary Burns, 10 yr recall    CORONARY ARTERY BYPASS GRAFT  2004    EGD  2015    DR Dora [address] Right     HERNIA REPAIR  1250?    umbilical hernia repair    KNEE ARTHROSCOPY Left     LAPAROSCOPY INSERTION PERITONEAL CATHETER N/A 5/26/2017    LAPAROSCOPIC INSERTION PERITONEAL DIALYSIS CATHETER WITH OMENTOPEXY performed by Elizabeth Jose MD at [address] [place] [address] N/A 7/17/2017    DIAGNOSTIC LAPAROSCOPY performed by Elizabeth Jose MD at [address] [place] [address] N/A 3/2/2020    DIAGNOSTIC LAPAROSCOPIC EVALUATION OF PD CATH/  LYSIS OF ADHESIONS performed by Karla Diggs MD at [address]      also pacemaker replacement    [place] COLONOSCOPY FLX DX W/COLLJ SPEC WHEN PFRMD N/A 11/13/2018    Dr HAILEY Melo-Serrated HP x 1, HP x 2, RANDOM COLON BX NEGATIVE--3 yr recall    [place] EGD TRANSORAL BIOPSY SINGLE/MULTIPLE N/A 2018    Dr Zahida Melo-1cm round lesion in antrum, questionable polyp vs mass in duodenum--Repeat in 3 months (2/15/19)    UPPER GASTROINTESTINAL ENDOSCOPY N/A 2/15/2019    Dr HAILEY Melo-Peptic duodenitis, gastritis    UPPER GASTROINTESTINAL ENDOSCOPY N/A 2020    Dr Zahida Melo-focal area of nodularity measuring ~8mm in stomach, previous nodularity in duodenum was [de-identified]       Family History   Problem Relation Age of Onset    Heart Disease Mother     Heart Disease Father     Colon Cancer Sister     Esophageal Cancer Neg Hx     Liver Cancer Neg Hx     Liver Disease Neg Hx     Rectal Cancer Neg Hx     Stomach Cancer Neg Hx        No Known Allergies    Social History     Socioeconomic History    Marital status:      Spouse name: Not on file    Number of children: Not on file    Years of education: Not on file    Highest education level: Not on file   Occupational History    Not on file   Social Needs    Financial resource strain: Not on file    Food insecurity     Worry: Not on file     Inability: Not on file    Transportation needs     Medical: Not on file     Non-medical: Not on file   Tobacco Use    Smoking status: Former Smoker     Packs/day: 1.50     Years: 50.00     Pack years: 75.00     Types: Cigarettes     Last attempt to quit: 10/26/2018     Years since quittin.5    Smokeless tobacco: Never Used   Substance and Sexual Activity    Alcohol use: No    Drug use: No    Sexual activity: Not on file   Lifestyle    Physical activity     Days per week: Not on file     Minutes per session: Not on file    Stress: Not on file   Relationships    Social connections     Talks on phone: Not on file     Gets together: Not on file     Attends [employer] service: Not on file     Active member of club or organization: Not on file     Attends meetings of clubs or organizations: Not on file     Relationship status: Not on file    Intimate partner violence     Fear of

## 2020-06-01 ENCOUNTER — HOSPITAL ENCOUNTER (OUTPATIENT)
Dept: CT IMAGING | Age: 70
Discharge: HOME | End: 2020-06-01
Payer: MEDICARE

## 2020-06-01 LAB
CELIAC PANEL: 4 UNITS (ref 0–19)
METHYLMALONIC ACID: 0.65 UMOL/L (ref 0–0.4)

## 2020-06-01 PROCEDURE — 70450 CT HEAD/BRAIN W/O DYE: CPT

## 2020-06-01 RX ORDER — CYANOCOBALAMIN 1000 UG/ML
1000 INJECTION INTRAMUSCULAR; SUBCUTANEOUS DAILY
Qty: 9 ML | Refills: 3 | Status: SHIPPED | OUTPATIENT
Start: 2020-06-01 | End: 2020-06-19

## 2020-06-04 ENCOUNTER — OFFICE VISIT (OUTPATIENT)
Dept: SURGERY | Age: 70
End: 2020-06-04
Payer: MEDICARE

## 2020-06-04 VITALS
WEIGHT: 171 LBS | OXYGEN SATURATION: 98 % | TEMPERATURE: 98.1 F | HEART RATE: 84 BPM | HEIGHT: 66 IN | BODY MASS INDEX: 27.48 KG/M2

## 2020-06-04 PROCEDURE — G8420 CALC BMI NORM PARAMETERS: HCPCS | Performed by: PHYSICIAN ASSISTANT

## 2020-06-04 PROCEDURE — 3017F COLORECTAL CA SCREEN DOC REV: CPT | Performed by: PHYSICIAN ASSISTANT

## 2020-06-04 PROCEDURE — 1123F ACP DISCUSS/DSCN MKR DOCD: CPT | Performed by: PHYSICIAN ASSISTANT

## 2020-06-04 PROCEDURE — 4040F PNEUMOC VAC/ADMIN/RCVD: CPT | Performed by: PHYSICIAN ASSISTANT

## 2020-06-04 PROCEDURE — 1036F TOBACCO NON-USER: CPT | Performed by: PHYSICIAN ASSISTANT

## 2020-06-04 PROCEDURE — G8428 CUR MEDS NOT DOCUMENT: HCPCS | Performed by: PHYSICIAN ASSISTANT

## 2020-06-04 PROCEDURE — 99213 OFFICE O/P EST LOW 20 MIN: CPT | Performed by: PHYSICIAN ASSISTANT

## 2020-06-04 NOTE — LETTER
Preop Orders:     Patient: Komal Arias  : 1950    Hospital:  [facility]    Admitting Physician:        Diagnosis: Right Inguinal Hernia    Procedure:Right Inguinal Hernia with mesh    Anesthesia: General    Admission:Outpatient    Date: 20    Labs:per anesthesia    Pre-Op Meds:  Kefzol 2grm IV      Latex Allergy: no    Beta Blocker: yes - patient advised to take    Medication Instructions: No plavix for 2 weeks prior to procedure; no asprin for 3 days prior to procedure    This has been electronically signed by Silvestre Pugh M.D.

## 2020-06-09 ENCOUNTER — TELEPHONE (OUTPATIENT)
Dept: SURGERY | Age: 70
End: 2020-06-09

## 2020-06-16 ENCOUNTER — TELEPHONE (OUTPATIENT)
Dept: NEUROLOGY | Age: 70
End: 2020-06-16

## 2020-06-19 ENCOUNTER — HOSPITAL ENCOUNTER (OUTPATIENT)
Dept: PREADMISSION TESTING | Age: 70
Discharge: HOME | End: 2020-06-23
Payer: MEDICARE

## 2020-06-19 ENCOUNTER — OFFICE VISIT (OUTPATIENT)
Age: 70
End: 2020-06-19

## 2020-06-19 VITALS — HEART RATE: 60 BPM | TEMPERATURE: 98.2 F | OXYGEN SATURATION: 97 %

## 2020-06-19 VITALS — HEIGHT: 66 IN | BODY MASS INDEX: 26.84 KG/M2 | WEIGHT: 167 LBS

## 2020-06-19 PROCEDURE — 87081 CULTURE SCREEN ONLY: CPT

## 2020-06-20 LAB — MRSA CULTURE ONLY: NORMAL

## 2020-06-22 LAB
REPORT: NORMAL
SARS-COV-2: NOT DETECTED
THIS TEST SENT TO: NORMAL

## 2020-06-23 ENCOUNTER — HOSPITAL ENCOUNTER (OUTPATIENT)
Age: 70
Setting detail: OUTPATIENT SURGERY
Discharge: HOME | End: 2020-06-23
Attending: SURGERY | Admitting: SURGERY
Payer: MEDICARE

## 2020-06-23 ENCOUNTER — ANESTHESIA EVENT (OUTPATIENT)
Dept: OPERATING ROOM | Age: 70
End: 2020-06-23
Payer: MEDICARE

## 2020-06-23 ENCOUNTER — ANESTHESIA (OUTPATIENT)
Dept: OPERATING ROOM | Age: 70
End: 2020-06-23
Payer: MEDICARE

## 2020-06-23 VITALS
HEART RATE: 60 BPM | TEMPERATURE: 97.3 F | BODY MASS INDEX: 24.11 KG/M2 | OXYGEN SATURATION: 96 % | HEIGHT: 66 IN | SYSTOLIC BLOOD PRESSURE: 134 MMHG | RESPIRATION RATE: 16 BRPM | WEIGHT: 150 LBS | DIASTOLIC BLOOD PRESSURE: 85 MMHG

## 2020-06-23 VITALS
OXYGEN SATURATION: 99 % | DIASTOLIC BLOOD PRESSURE: 64 MMHG | SYSTOLIC BLOOD PRESSURE: 96 MMHG | TEMPERATURE: 96.1 F | RESPIRATION RATE: 4 BRPM

## 2020-06-23 PROBLEM — K40.90 NON-RECURRENT UNILATERAL INGUINAL HERNIA WITHOUT OBSTRUCTION OR GANGRENE: Status: ACTIVE | Noted: 2020-06-23

## 2020-06-23 LAB
ALBUMIN SERPL-MCNC: 3.2 G/DL (ref 3.5–5.2)
ALP BLD-CCNC: 70 U/L (ref 40–130)
ALT SERPL-CCNC: 9 U/L (ref 5–41)
ANION GAP SERPL CALCULATED.3IONS-SCNC: 7 MMOL/L (ref 7–19)
APTT: 26.7 SEC (ref 26–36.2)
AST SERPL-CCNC: <5 U/L (ref 5–40)
BILIRUB SERPL-MCNC: 0.4 MG/DL (ref 0.2–1.2)
BUN BLDV-MCNC: 30 MG/DL (ref 8–23)
CALCIUM SERPL-MCNC: 11.2 MG/DL (ref 8.8–10.2)
CHLORIDE BLD-SCNC: 104 MMOL/L (ref 98–111)
CO2: 30 MMOL/L (ref 22–29)
CREAT SERPL-MCNC: 5.5 MG/DL (ref 0.5–1.2)
GFR NON-AFRICAN AMERICAN: 10
GLUCOSE BLD-MCNC: 117 MG/DL (ref 74–109)
HCT VFR BLD CALC: 41.2 % (ref 42–52)
HEMOGLOBIN: 13.7 G/DL (ref 14–18)
INR BLD: 0.99 (ref 0.88–1.18)
MCH RBC QN AUTO: 34.3 PG (ref 27–31)
MCHC RBC AUTO-ENTMCNC: 33.3 G/DL (ref 33–37)
MCV RBC AUTO: 103 FL (ref 80–94)
PDW BLD-RTO: 13.2 % (ref 11.5–14.5)
PLATELET # BLD: 108 K/UL (ref 130–400)
PMV BLD AUTO: 11.5 FL (ref 9.4–12.4)
POTASSIUM SERPL-SCNC: 4.1 MMOL/L (ref 3.5–4.9)
PROTHROMBIN TIME: 13 SEC (ref 12–14.6)
RBC # BLD: 4 M/UL (ref 4.7–6.1)
SODIUM BLD-SCNC: 141 MMOL/L (ref 136–145)
TOTAL PROTEIN: 5.1 G/DL (ref 6.6–8.7)
WBC # BLD: 7.2 K/UL (ref 4.8–10.8)

## 2020-06-23 PROCEDURE — 2500000003 HC RX 250 WO HCPCS: Performed by: SURGERY

## 2020-06-23 PROCEDURE — 36415 COLL VENOUS BLD VENIPUNCTURE: CPT

## 2020-06-23 PROCEDURE — 6360000002 HC RX W HCPCS: Performed by: ANESTHESIOLOGY

## 2020-06-23 PROCEDURE — 2580000003 HC RX 258: Performed by: SURGERY

## 2020-06-23 PROCEDURE — 7100000000 HC PACU RECOVERY - FIRST 15 MIN: Performed by: SURGERY

## 2020-06-23 PROCEDURE — 64486 TAP BLOCK UNIL BY INJECTION: CPT

## 2020-06-23 PROCEDURE — 6360000002 HC RX W HCPCS

## 2020-06-23 PROCEDURE — 7100000010 HC PHASE II RECOVERY - FIRST 15 MIN: Performed by: SURGERY

## 2020-06-23 PROCEDURE — 3700000000 HC ANESTHESIA ATTENDED CARE: Performed by: SURGERY

## 2020-06-23 PROCEDURE — 49505 PRP I/HERN INIT REDUC >5 YR: CPT | Performed by: SURGERY

## 2020-06-23 PROCEDURE — 76942 ECHO GUIDE FOR BIOPSY: CPT

## 2020-06-23 PROCEDURE — 3600000004 HC SURGERY LEVEL 4 BASE: Performed by: SURGERY

## 2020-06-23 PROCEDURE — 6360000002 HC RX W HCPCS: Performed by: SURGERY

## 2020-06-23 PROCEDURE — C9290 INJ, BUPIVACAINE LIPOSOME: HCPCS | Performed by: ANESTHESIOLOGY

## 2020-06-23 PROCEDURE — 85027 COMPLETE CBC AUTOMATED: CPT

## 2020-06-23 PROCEDURE — 2500000003 HC RX 250 WO HCPCS

## 2020-06-23 PROCEDURE — 3600000014 HC SURGERY LEVEL 4 ADDTL 15MIN: Performed by: SURGERY

## 2020-06-23 PROCEDURE — C1781 MESH (IMPLANTABLE): HCPCS | Performed by: SURGERY

## 2020-06-23 PROCEDURE — 85730 THROMBOPLASTIN TIME PARTIAL: CPT

## 2020-06-23 PROCEDURE — 7100000011 HC PHASE II RECOVERY - ADDTL 15 MIN: Performed by: SURGERY

## 2020-06-23 PROCEDURE — 49505 PRP I/HERN INIT REDUC >5 YR: CPT | Performed by: PHYSICIAN ASSISTANT

## 2020-06-23 PROCEDURE — 6370000000 HC RX 637 (ALT 250 FOR IP): Performed by: SURGERY

## 2020-06-23 PROCEDURE — 2500000003 HC RX 250 WO HCPCS: Performed by: ANESTHESIOLOGY

## 2020-06-23 PROCEDURE — 6370000000 HC RX 637 (ALT 250 FOR IP): Performed by: ANESTHESIOLOGY

## 2020-06-23 PROCEDURE — 7100000001 HC PACU RECOVERY - ADDTL 15 MIN: Performed by: SURGERY

## 2020-06-23 PROCEDURE — 85610 PROTHROMBIN TIME: CPT

## 2020-06-23 PROCEDURE — 2709999900 HC NON-CHARGEABLE SUPPLY: Performed by: SURGERY

## 2020-06-23 PROCEDURE — 3700000001 HC ADD 15 MINUTES (ANESTHESIA): Performed by: SURGERY

## 2020-06-23 PROCEDURE — 80053 COMPREHEN METABOLIC PANEL: CPT

## 2020-06-23 DEVICE — MESH SURG W5.5XL12.8CM UNDERLAY 10CM CONN OD1.9CM ID1.3CM: Type: IMPLANTABLE DEVICE | Status: FUNCTIONAL

## 2020-06-23 RX ORDER — HYDROCODONE BITARTRATE AND ACETAMINOPHEN 5; 325 MG/1; MG/1
1 TABLET ORAL EVERY 6 HOURS PRN
Qty: 12 TABLET | Refills: 0 | Status: SHIPPED | OUTPATIENT
Start: 2020-06-23 | End: 2020-08-26 | Stop reason: ALTCHOICE

## 2020-06-23 RX ORDER — GLYCOPYRROLATE 0.2 MG/ML
INJECTION INTRAMUSCULAR; INTRAVENOUS PRN
Status: DISCONTINUED | OUTPATIENT
Start: 2020-06-23 | End: 2020-06-23 | Stop reason: SDUPTHER

## 2020-06-23 RX ORDER — ONDANSETRON 2 MG/ML
INJECTION INTRAMUSCULAR; INTRAVENOUS PRN
Status: DISCONTINUED | OUTPATIENT
Start: 2020-06-23 | End: 2020-06-23 | Stop reason: SDUPTHER

## 2020-06-23 RX ORDER — EPHEDRINE SULFATE 50 MG/ML
INJECTION, SOLUTION INTRAVENOUS PRN
Status: DISCONTINUED | OUTPATIENT
Start: 2020-06-23 | End: 2020-06-23 | Stop reason: SDUPTHER

## 2020-06-23 RX ORDER — SODIUM CHLORIDE, SODIUM LACTATE, POTASSIUM CHLORIDE, CALCIUM CHLORIDE 600; 310; 30; 20 MG/100ML; MG/100ML; MG/100ML; MG/100ML
INJECTION, SOLUTION INTRAVENOUS CONTINUOUS
Status: DISCONTINUED | OUTPATIENT
Start: 2020-06-23 | End: 2020-06-23 | Stop reason: HOSPADM

## 2020-06-23 RX ORDER — ROCURONIUM BROMIDE 10 MG/ML
INJECTION, SOLUTION INTRAVENOUS PRN
Status: DISCONTINUED | OUTPATIENT
Start: 2020-06-23 | End: 2020-06-23 | Stop reason: SDUPTHER

## 2020-06-23 RX ORDER — MORPHINE SULFATE 4 MG/ML
4 INJECTION, SOLUTION INTRAMUSCULAR; INTRAVENOUS EVERY 5 MIN PRN
Status: DISCONTINUED | OUTPATIENT
Start: 2020-06-23 | End: 2020-06-23 | Stop reason: HOSPADM

## 2020-06-23 RX ORDER — SODIUM CHLORIDE 0.9 % (FLUSH) 0.9 %
10 SYRINGE (ML) INJECTION PRN
Status: DISCONTINUED | OUTPATIENT
Start: 2020-06-23 | End: 2020-06-23 | Stop reason: HOSPADM

## 2020-06-23 RX ORDER — MIDAZOLAM HYDROCHLORIDE 1 MG/ML
2 INJECTION INTRAMUSCULAR; INTRAVENOUS
Status: DISCONTINUED | OUTPATIENT
Start: 2020-06-23 | End: 2020-06-23 | Stop reason: HOSPADM

## 2020-06-23 RX ORDER — SCOLOPAMINE TRANSDERMAL SYSTEM 1 MG/1
1 PATCH, EXTENDED RELEASE TRANSDERMAL ONCE
Status: DISCONTINUED | OUTPATIENT
Start: 2020-06-23 | End: 2020-06-23 | Stop reason: HOSPADM

## 2020-06-23 RX ORDER — FENTANYL CITRATE 50 UG/ML
50 INJECTION, SOLUTION INTRAMUSCULAR; INTRAVENOUS
Status: DISCONTINUED | OUTPATIENT
Start: 2020-06-23 | End: 2020-06-23 | Stop reason: HOSPADM

## 2020-06-23 RX ORDER — BUPIVACAINE HYDROCHLORIDE 2.5 MG/ML
INJECTION, SOLUTION INFILTRATION; PERINEURAL PRN
Status: DISCONTINUED | OUTPATIENT
Start: 2020-06-23 | End: 2020-06-23 | Stop reason: ALTCHOICE

## 2020-06-23 RX ORDER — PROPOFOL 10 MG/ML
INJECTION, EMULSION INTRAVENOUS PRN
Status: DISCONTINUED | OUTPATIENT
Start: 2020-06-23 | End: 2020-06-23 | Stop reason: SDUPTHER

## 2020-06-23 RX ORDER — LIDOCAINE HYDROCHLORIDE 10 MG/ML
1 INJECTION, SOLUTION EPIDURAL; INFILTRATION; INTRACAUDAL; PERINEURAL
Status: DISCONTINUED | OUTPATIENT
Start: 2020-06-23 | End: 2020-06-23 | Stop reason: HOSPADM

## 2020-06-23 RX ORDER — BUPIVACAINE HYDROCHLORIDE 2.5 MG/ML
INJECTION, SOLUTION EPIDURAL; INFILTRATION; INTRACAUDAL
Status: COMPLETED | OUTPATIENT
Start: 2020-06-23 | End: 2020-06-23

## 2020-06-23 RX ORDER — PROMETHAZINE HYDROCHLORIDE 25 MG/ML
6.25 INJECTION, SOLUTION INTRAMUSCULAR; INTRAVENOUS
Status: DISCONTINUED | OUTPATIENT
Start: 2020-06-23 | End: 2020-06-23 | Stop reason: HOSPADM

## 2020-06-23 RX ORDER — DEXAMETHASONE SODIUM PHOSPHATE 10 MG/ML
INJECTION, SOLUTION INTRAMUSCULAR; INTRAVENOUS PRN
Status: DISCONTINUED | OUTPATIENT
Start: 2020-06-23 | End: 2020-06-23 | Stop reason: SDUPTHER

## 2020-06-23 RX ORDER — HYDRALAZINE HYDROCHLORIDE 20 MG/ML
5 INJECTION INTRAMUSCULAR; INTRAVENOUS EVERY 10 MIN PRN
Status: DISCONTINUED | OUTPATIENT
Start: 2020-06-23 | End: 2020-06-23 | Stop reason: HOSPADM

## 2020-06-23 RX ORDER — FENTANYL CITRATE 50 UG/ML
INJECTION, SOLUTION INTRAMUSCULAR; INTRAVENOUS PRN
Status: DISCONTINUED | OUTPATIENT
Start: 2020-06-23 | End: 2020-06-23 | Stop reason: SDUPTHER

## 2020-06-23 RX ORDER — DIPHENHYDRAMINE HYDROCHLORIDE 50 MG/ML
12.5 INJECTION INTRAMUSCULAR; INTRAVENOUS
Status: DISCONTINUED | OUTPATIENT
Start: 2020-06-23 | End: 2020-06-23 | Stop reason: HOSPADM

## 2020-06-23 RX ORDER — SODIUM CHLORIDE 0.9 % (FLUSH) 0.9 %
10 SYRINGE (ML) INJECTION EVERY 12 HOURS SCHEDULED
Status: DISCONTINUED | OUTPATIENT
Start: 2020-06-23 | End: 2020-06-23 | Stop reason: HOSPADM

## 2020-06-23 RX ORDER — MORPHINE SULFATE 4 MG/ML
4 INJECTION, SOLUTION INTRAMUSCULAR; INTRAVENOUS
Status: DISCONTINUED | OUTPATIENT
Start: 2020-06-23 | End: 2020-06-23 | Stop reason: HOSPADM

## 2020-06-23 RX ORDER — LABETALOL HYDROCHLORIDE 5 MG/ML
5 INJECTION, SOLUTION INTRAVENOUS EVERY 10 MIN PRN
Status: DISCONTINUED | OUTPATIENT
Start: 2020-06-23 | End: 2020-06-23 | Stop reason: HOSPADM

## 2020-06-23 RX ORDER — LIDOCAINE HYDROCHLORIDE 10 MG/ML
INJECTION, SOLUTION EPIDURAL; INFILTRATION; INTRACAUDAL; PERINEURAL PRN
Status: DISCONTINUED | OUTPATIENT
Start: 2020-06-23 | End: 2020-06-23 | Stop reason: SDUPTHER

## 2020-06-23 RX ORDER — HYDROMORPHONE HYDROCHLORIDE 1 MG/ML
0.25 INJECTION, SOLUTION INTRAMUSCULAR; INTRAVENOUS; SUBCUTANEOUS EVERY 5 MIN PRN
Status: DISCONTINUED | OUTPATIENT
Start: 2020-06-23 | End: 2020-06-23 | Stop reason: HOSPADM

## 2020-06-23 RX ORDER — MEPERIDINE HYDROCHLORIDE 50 MG/ML
12.5 INJECTION INTRAMUSCULAR; INTRAVENOUS; SUBCUTANEOUS EVERY 5 MIN PRN
Status: DISCONTINUED | OUTPATIENT
Start: 2020-06-23 | End: 2020-06-23 | Stop reason: HOSPADM

## 2020-06-23 RX ORDER — METOCLOPRAMIDE HYDROCHLORIDE 5 MG/ML
10 INJECTION INTRAMUSCULAR; INTRAVENOUS
Status: DISCONTINUED | OUTPATIENT
Start: 2020-06-23 | End: 2020-06-23 | Stop reason: HOSPADM

## 2020-06-23 RX ORDER — HYDROMORPHONE HYDROCHLORIDE 1 MG/ML
0.5 INJECTION, SOLUTION INTRAMUSCULAR; INTRAVENOUS; SUBCUTANEOUS EVERY 5 MIN PRN
Status: DISCONTINUED | OUTPATIENT
Start: 2020-06-23 | End: 2020-06-23 | Stop reason: HOSPADM

## 2020-06-23 RX ORDER — SODIUM CHLORIDE 450 MG/100ML
INJECTION, SOLUTION INTRAVENOUS CONTINUOUS
Status: DISCONTINUED | OUTPATIENT
Start: 2020-06-23 | End: 2020-06-23 | Stop reason: HOSPADM

## 2020-06-23 RX ORDER — MORPHINE SULFATE 4 MG/ML
2 INJECTION, SOLUTION INTRAMUSCULAR; INTRAVENOUS EVERY 5 MIN PRN
Status: DISCONTINUED | OUTPATIENT
Start: 2020-06-23 | End: 2020-06-23 | Stop reason: HOSPADM

## 2020-06-23 RX ORDER — MIDAZOLAM HYDROCHLORIDE 1 MG/ML
2 INJECTION INTRAMUSCULAR; INTRAVENOUS
Status: COMPLETED | OUTPATIENT
Start: 2020-06-23 | End: 2020-06-23

## 2020-06-23 RX ORDER — HYDROCODONE BITARTRATE AND ACETAMINOPHEN 5; 325 MG/1; MG/1
1 TABLET ORAL
Status: COMPLETED | OUTPATIENT
Start: 2020-06-23 | End: 2020-06-23

## 2020-06-23 RX ADMIN — BUPIVACAINE HYDROCHLORIDE 20 ML: 2.5 INJECTION, SOLUTION EPIDURAL; INFILTRATION; INTRACAUDAL; PERINEURAL at 13:44

## 2020-06-23 RX ADMIN — EPHEDRINE SULFATE 10 MG: 50 INJECTION INTRAMUSCULAR; INTRAVENOUS; SUBCUTANEOUS at 15:22

## 2020-06-23 RX ADMIN — LIDOCAINE HYDROCHLORIDE 50 MG: 10 INJECTION, SOLUTION EPIDURAL; INFILTRATION; INTRACAUDAL; PERINEURAL at 14:56

## 2020-06-23 RX ADMIN — EPHEDRINE SULFATE 10 MG: 50 INJECTION INTRAMUSCULAR; INTRAVENOUS; SUBCUTANEOUS at 15:03

## 2020-06-23 RX ADMIN — HYDROCODONE BITARTRATE AND ACETAMINOPHEN 1 TABLET: 5; 325 TABLET ORAL at 17:28

## 2020-06-23 RX ADMIN — NEOSTIGMINE METHYLSULFATE 3 MG: 1 INJECTION, SOLUTION INTRAMUSCULAR; INTRAVENOUS; SUBCUTANEOUS at 15:55

## 2020-06-23 RX ADMIN — GLYCOPYRROLATE 0.4 MG: 0.2 INJECTION, SOLUTION INTRAMUSCULAR; INTRAVENOUS at 15:54

## 2020-06-23 RX ADMIN — MORPHINE SULFATE 2 MG: 4 INJECTION INTRAVENOUS at 16:29

## 2020-06-23 RX ADMIN — BUPIVACAINE 20 ML: 13.3 INJECTION, SUSPENSION, LIPOSOMAL INFILTRATION at 13:44

## 2020-06-23 RX ADMIN — ONDANSETRON HYDROCHLORIDE 4 MG: 2 INJECTION, SOLUTION INTRAMUSCULAR; INTRAVENOUS at 15:04

## 2020-06-23 RX ADMIN — MIDAZOLAM HYDROCHLORIDE 2 MG: 2 INJECTION, SOLUTION INTRAMUSCULAR; INTRAVENOUS at 13:38

## 2020-06-23 RX ADMIN — FENTANYL CITRATE 50 MCG: 50 INJECTION INTRAMUSCULAR; INTRAVENOUS at 14:56

## 2020-06-23 RX ADMIN — MORPHINE SULFATE 2 MG: 4 INJECTION INTRAVENOUS at 16:38

## 2020-06-23 RX ADMIN — SODIUM CHLORIDE: 4.5 INJECTION, SOLUTION INTRAVENOUS at 12:38

## 2020-06-23 RX ADMIN — DEXAMETHASONE SODIUM PHOSPHATE 10 MG: 10 INJECTION, SOLUTION INTRAMUSCULAR; INTRAVENOUS at 15:04

## 2020-06-23 RX ADMIN — Medication 2 G: at 15:00

## 2020-06-23 RX ADMIN — PROPOFOL 120 MG: 10 INJECTION, EMULSION INTRAVENOUS at 14:56

## 2020-06-23 RX ADMIN — ROCURONIUM BROMIDE 50 MG: 10 INJECTION, SOLUTION INTRAVENOUS at 14:56

## 2020-06-23 ASSESSMENT — LIFESTYLE VARIABLES: SMOKING_STATUS: 1

## 2020-06-23 ASSESSMENT — PAIN DESCRIPTION - ONSET
ONSET: AWAKENED FROM SLEEP
ONSET: AWAKENED FROM SLEEP

## 2020-06-23 ASSESSMENT — PAIN DESCRIPTION - DESCRIPTORS
DESCRIPTORS: SORE
DESCRIPTORS: SORE

## 2020-06-23 ASSESSMENT — PAIN DESCRIPTION - ORIENTATION
ORIENTATION: RIGHT
ORIENTATION: RIGHT

## 2020-06-23 ASSESSMENT — PAIN SCALES - GENERAL
PAINLEVEL_OUTOF10: 7
PAINLEVEL_OUTOF10: 6
PAINLEVEL_OUTOF10: 2
PAINLEVEL_OUTOF10: 6
PAINLEVEL_OUTOF10: 7

## 2020-06-23 ASSESSMENT — PAIN DESCRIPTION - LOCATION
LOCATION: GROIN
LOCATION: GROIN

## 2020-06-23 ASSESSMENT — PAIN DESCRIPTION - PAIN TYPE
TYPE: SURGICAL PAIN
TYPE: SURGICAL PAIN

## 2020-06-23 ASSESSMENT — PAIN DESCRIPTION - FREQUENCY
FREQUENCY: CONTINUOUS
FREQUENCY: INTERMITTENT

## 2020-06-23 ASSESSMENT — ENCOUNTER SYMPTOMS: SHORTNESS OF BREATH: 0

## 2020-06-23 NOTE — ANESTHESIA PRE PROCEDURE
Department of Anesthesiology  Preprocedure Note       Name:  Jun Casas   Age:  78 y.o.  :  1950                                          MRN:  [id number]         Date:  2020      Surgeon: Maya Mooney):  Nupur Pendleton MD    Procedure: Procedure(s):  RIGHT INGUINAL HERNIA WITH MESH    Medications prior to admission:   Prior to Admission medications    Medication Sig Start Date End Date Taking? Authorizing Provider   Needles & Syringes MISC 1 each by Does not apply route daily For vitamin B 12 injections 20  Yes Damien Wilhelm MD   Ferric Citrate (AURYXIA) 1  MG(Fe) TABS Take by mouth daily   Yes Historical Provider, MD   omeprazole (PRILOSEC) 40 MG delayed release capsule Take 1 capsule by mouth daily Take first thing daily on an empty stomach. 20  Yes LOIDA Denton   Ergocalciferol (VITAMIN D2 PO) Take 50,000 Units by mouth once a week Indications: 50, 000 units Tuesday   Yes Historical Provider, MD   B COMPLEX-C-FOLIC ACID ER PO Take 377 mg by mouth daily    Yes Historical Provider, MD   calcitRIOL (ROCALTROL) 0.25 MCG capsule Take 0.25 mcg by mouth daily   Yes Historical Provider, MD   POTASSIUM CHLORIDE ER PO Take 25 mEq by mouth daily   Yes Historical Provider, MD   amLODIPine (NORVASC) 10 MG tablet Take 5 mg by mouth daily  16  Yes Historical Provider, MD   cyanocobalamin 1000 MCG/ML injection Inject 1 mL into the muscle daily for 5 days Then weekly x 4 weeks then monthly 20  Damien Wilhelm MD   LORazepam (ATIVAN) 1 MG tablet Take 1 tablet by mouth as needed for Anxiety (on before MRI and repeat in 20 minutes if ineffective) for up to 30 days.  20  Damien Wilhelm MD   traZODone (DESYREL) 50 MG tablet Take 50 mg by mouth nightly    Historical Provider, MD   apixaban (ELIQUIS) 2.5 MG TABS tablet Take 2.5 mg by mouth 2 times daily    Historical Provider, MD   acetaminophen (TYLENOL) 500 MG tablet Take 500 mg by mouth every 6 hours as needed for Pain Historical Provider, MD   gentamicin (GARAMYCIN) 0.1 % cream Apply topically daily Apply topically 3 times daily.     Historical Provider, MD   bumetanide (BUMEX) 2 MG tablet Take 2 mg by mouth daily     Historical Provider, MD   fluticasone-salmeterol (ADVAIR) 100-50 MCG/DOSE diskus inhaler Inhale 1 puff into the lungs 2 times daily as needed     Historical Provider, MD   allopurinol (ZYLOPRIM) 100 MG tablet 100 mg 2 times daily Indications: Arthritis  10/26/16   Historical Provider, MD   carvedilol (COREG) 12.5 MG tablet Take 12.5 mg by mouth 2 times daily (with meals) Indications: High Blood Pressure  10/10/16   Historical Provider, MD   SITagliptin (JANUVIA) 25 MG tablet Take 50 mg by mouth daily Indications: Diabetes  10/24/16   Historical Provider, MD   aspirin 81 MG tablet Take 81 mg by mouth daily    Historical Provider, MD       Current medications:    Current Facility-Administered Medications   Medication Dose Route Frequency Provider Last Rate Last Dose    0.45 % sodium chloride infusion   Intravenous Continuous Ravinder Pedroza MD 50 mL/hr at 06/23/20 1238      ceFAZolin (ANCEF) 2 g in 0.9% sodium chloride 50 mL IVPB  2 g Intravenous Once Ravinder Pedroza MD        midazolam (VERSED) injection 2 mg  2 mg Intravenous Once PRN Abdoul Olson MD           Allergies:  No Known Allergies    Problem List:    Patient Active Problem List   Diagnosis Code    CAD (coronary artery disease) I25.10    Diabetes mellitus ([facility] E11.9    Hypertension I10    Hypercholesteremia E78.00    Cigarette smoker F17.210    Pacemaker Z95.0    Chronic kidney disease (CKD), stage V ([facility] N18.5    Gallstones K80.20    Anemia of chronic kidney failure N18.9, D63.1    Family history of colon cancer Z80.0    Diarrhea R19.7    Dark stools R19.5    Right lower quadrant abdominal pain R10.31    S/P cholecystectomy Z90.49    Dysphagia R13.10    Chronic heartburn R12    Chronic nausea R11.0    Weight loss R63.4    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    Personal history of colonic polyps Z86.010    S/P endoscopy Z98.890    S/P colonoscopic polypectomy Z98.890    History of anemia due to chronic kidney disease N18.9, Z86.2    Anemia in chronic kidney disease, on chronic dialysis (HCC) N18.6, D63.1, Z99.2    Chronic kidney disease, stage 5 (HCC)  N18.5    Generalized abdominal pain R10.84    ESRD (end stage renal disease) on dialysis (HCC) N18.6, Z99.2    Current use of long term anticoagulation Z79.01       Past Medical History:        Diagnosis Date    Anemia     CAD (coronary artery disease) 1/31/2015    sees [facility] heart group.  CHF (congestive heart failure) ([facility])     Chronic kidney disease (CKD), stage V ([address] 5/26/2017    Cigarette smoker 1/31/2015    COPD (chronic obstructive pulmonary disease) ([facility])     Diabetes mellitus ([address] 1/31/2015    Diarrhea     End stage renal disease ([address]     Hypercholesteremia 1/31/2015    Hypertension 1/31/2015    Pacemaker 1/31/2015    Peritoneal dialysis catheter dysfunction ([facility])     Sleep apnea     No MACHINE    Stomach pain     Thrombocytopenia, unspecified ([address]        Past Surgical History:        Procedure Laterality Date    CARDIAC CATHETERIZATION  2/2/15  JDT    stent to mid RCA.  EF 45%    CARPAL TUNNEL RELEASE Right     CHOLECYSTECTOMY, LAPAROSCOPIC N/A 12/8/2017    CHOLECYSTECTOMY LAPAROSCOPIC performed by Palma Morocho MD at [address] [place] [address]  02/22/2013    Dr Mcmahan Revel yr recall    COLONOSCOPY N/A 5/13/2020    Dr Angelica Suarez, 10 yr recall    CORONARY ARTERY BYPASS GRAFT  2004    EGD  2015    DR [address] Right     HERNIA REPAIR  9455?    umbilical hernia repair    KNEE ARTHROSCOPY Left     LAPAROSCOPY INSERTION PERITONEAL CATHETER N/A 5/26/2017    LAPAROSCOPIC INSERTION PERITONEAL DIALYSIS CATHETER WITH OMENTOPEXY performed by Olesya Vidal MD at [facility] N/A 7/17/2017    DIAGNOSTIC angina    NYHA Classification: I  ECG reviewed  Rhythm: regular  Rate: normal           Beta Blocker:  Not on Beta Blocker         Neuro/Psych:   Negative Neuro/Psych ROS     (-) seizures, CVA and depression/anxiety            GI/Hepatic/Renal: Neg GI/Hepatic/Renal ROS  (+) renal disease: ESRD, ARF and dialysis,      (-) hiatal hernia and GERD       Endo/Other: Negative Endo/Other ROS   (+) DiabetesType II DM, , .          Pt had PAT visit. Abdominal:       Abdomen: soft. Vascular:                                        Anesthesia Plan      general and regional     ASA 4     (Iv zofran within 30 min of closing )  Induction: intravenous. BIS  MIPS: Postoperative opioids intended and Prophylactic antiemetics administered. Anesthetic plan and risks discussed with patient. Use of blood products discussed with patient whom. Plan discussed with CRNA.     Attending anesthesiologist reviewed and agrees with Pre Eval content              Yehuda Argueta MD   6/23/2020

## 2020-06-23 NOTE — ANESTHESIA PROCEDURE NOTES
Peripheral Block    Patient location during procedure: pre-op  Start time: 6/23/2020 1:44 PM  End time: 6/23/2020 1:44 PM  Staffing  Anesthesiologist: Anyi Hubbard MD  Performed: anesthesiologist   Preanesthetic Checklist  Completed: patient identified, site marked, surgical consent, pre-op evaluation, timeout performed, IV checked, risks and benefits discussed, monitors and equipment checked, anesthesia consent given, oxygen available and patient being monitored  Peripheral Block  Prep: ChloraPrep  Patient monitoring: cardiac monitor, continuous pulse ox, frequent blood pressure checks and IV access  Block type: TAP  Laterality: right  Injection technique: single-shot  Procedures: ultrasound guided  Provider prep: mask and sterile gloves  Assessment  Injection assessment: negative aspiration for heme and no paresthesia on injection  Paresthesia pain: immediately resolved  Slow fractionated injection: yes  Hemodynamics: stable  Additional Notes  A inplane imaging technique was used, the needle was introduced midpoint between the iliac crest and costal margin,anterior to posterior through theQuadratus lumborum ( muscle  (transmuscular approach), to deposit the LA between the fascial layer of the QL and Psoas Muscle outside the anterior layer of the TLF(thoraco-lumbar fascia). A total of 10cc of 0.25% Bupivicaine and 20 cc ofEXparel injected in divided doses, No intravascular injection was noted . Kidney and Lumbar arterial structures were not seen in the acoustic window.      Medications Administered  Bupivacaine liposome (EXPAREL) injection 1.3%, 20 mL  bupivacaine (MARCAINE) PF injection 0.25%, 20 mL  Reason for block: post-op pain management

## 2020-06-23 NOTE — ANESTHESIA POSTPROCEDURE EVALUATION
Department of Anesthesiology  Postprocedure Note    Patient: Elif Pendleton  MRN: [id number]  [place]: 1950  Date of evaluation: 6/23/2020  Time:  4:12 PM     Procedure Summary     Date:  06/23/20 Room / Location:  [address]    Anesthesia Start:  2804 Anesthesia Stop:  5749    Procedure:  RIGHT INGUINAL HERNIA WITH MESH (Right ) Diagnosis:  (RIGHT INGUINAL HERNIA)    Surgeon:  Isaac Ramírez MD Responsible Provider:  LOIDA Sloan CRNA    Anesthesia Type:  general, regional ASA Status:  4          Anesthesia Type: general, regional    Madhuri Phase I: Madhuri Score: 10    Madhuri Phase II:      Last vitals: Reviewed and per EMR flowsheets.        Anesthesia Post Evaluation    Patient location during evaluation: PACU  Patient participation: complete - patient participated  Level of consciousness: awake and alert  Pain score: 0  Airway patency: patent  Nausea & Vomiting: no nausea and no vomiting  Complications: no  Cardiovascular status: hemodynamically stable and blood pressure returned to baseline  Respiratory status: acceptable and room air  Hydration status: stable

## 2020-06-23 NOTE — PROGRESS NOTES
Patient bladder scanned with result of 197 ml of urine in urinary bladder. Indwelling urinary catheter placed with return of 200 ml dark yellow urine.

## 2020-06-23 NOTE — H&P
HISTORY OF PRESENT ILLNESS:  Elisa Prieto is very well known from history of end stage renal failure and pd catheter placement. He presents today to discuss right inguinal hernia surgery. He has been very tender over the last several months. He denies strangulation or incarceration. 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    Personal history of colonic polyps 01/16/2019    S/P endoscopy 01/16/2019    S/P colonoscopic polypectomy 01/16/2019    Family history of colon cancer 08/28/2018    Diarrhea 08/28/2018    Dark stools 08/28/2018    Right lower quadrant abdominal pain 08/28/2018    S/P cholecystectomy 08/28/2018    Dysphagia 08/28/2018    Chronic heartburn 08/28/2018    Chronic nausea 08/28/2018    Weight loss 08/28/2018    Anemia of chronic kidney failure 07/11/2018    Gallstones 12/08/2017    Chronic kidney disease (CKD), stage V ([address] 05/26/2017    CAD (coronary artery disease) 01/31/2015    Diabetes mellitus ([facility] 01/31/2015    Hypertension 01/31/2015    Hypercholesteremia 01/31/2015    Cigarette smoker 01/31/2015    Pacemaker 01/31/2015     No current facility-administered medications for this encounter.       Current Outpatient Medications   Medication Sig Dispense Refill    cyanocobalamin 1000 MCG/ML injection Inject 1 mL into the muscle daily for 5 days Then weekly x 4 weeks then monthly 9 mL 3    Needles & Syringes MISC 1 each by Does not apply route daily For vitamin B 12 injections 100 each 3    LORazepam (ATIVAN) 1 MG tablet Take 1 tablet by mouth as needed for Anxiety (on before MRI and

## 2020-07-09 NOTE — PROGRESS NOTES
01/31/2015    Pacemaker 01/31/2015     Current Outpatient Medications   Medication Sig Dispense Refill    cyanocobalamin 1000 MCG/ML injection Inject 1 mL into the muscle daily for 5 days Then weekly x 4 weeks then monthly 9 mL 3    Needles & Syringes MISC 1 each by Does not apply route daily For vitamin B 12 injections 100 each 3    traZODone (DESYREL) 50 MG tablet Take 50 mg by mouth nightly      Ferric Citrate (AURYXIA) 1  MG(Fe) TABS Take by mouth daily      omeprazole (PRILOSEC) 40 MG delayed release capsule Take 1 capsule by mouth daily Take first thing daily on an empty stomach. 30 capsule 3    apixaban (ELIQUIS) 2.5 MG TABS tablet Take 2.5 mg by mouth 2 times daily      acetaminophen (TYLENOL) 500 MG tablet Take 500 mg by mouth every 6 hours as needed for Pain      gentamicin (GARAMYCIN) 0.1 % cream Apply topically daily Apply topically 3 times daily.  Ergocalciferol (VITAMIN D2 PO) Take 50,000 Units by mouth once a week Indications: 50, 000 units Tuesday      B COMPLEX-C-FOLIC ACID ER PO Take 772 mg by mouth daily       bumetanide (BUMEX) 2 MG tablet Take 2 mg by mouth daily       calcitRIOL (ROCALTROL) 0.25 MCG capsule Take 0.25 mcg by mouth daily      POTASSIUM CHLORIDE ER PO Take 25 mEq by mouth daily      allopurinol (ZYLOPRIM) 100 MG tablet 100 mg 2 times daily Indications: Arthritis       amLODIPine (NORVASC) 10 MG tablet Take 5 mg by mouth daily       carvedilol (COREG) 12.5 MG tablet Take 12.5 mg by mouth 2 times daily (with meals) Indications: High Blood Pressure       SITagliptin (JANUVIA) 25 MG tablet Take 50 mg by mouth daily Indications: Diabetes       aspirin 81 MG tablet Take 81 mg by mouth daily      HYDROcodone-acetaminophen (NORCO) 5-325 MG per tablet Take 1 tablet by mouth every 6 hours as needed for Pain.  12 tablet 0    fluticasone-salmeterol (ADVAIR) 100-50 MCG/DOSE diskus inhaler Inhale 1 puff into the lungs 2 times daily as needed        No current facility-administered medications for this visit. Allergies: Patient has no known allergies. Past Medical History:   Diagnosis Date    Anemia     CAD (coronary artery disease) 1/31/2015    sees [facility] heart [facility].  CH[facility]congest[facility] heart failure) ([facility]     Chronic kidney disease (CKD), stage V ([facility] 5/26/2017    Cigarette smoker 1/31/2015    COPD (chronic obstructive pulmonary disease) (HCC)     Diabetes mellitus ([facility] 1/31/2015    Diarrhea     End stage renal disease ([facility]     Hypercholesteremia 1/31/2015    Hypertension 1/31/2015    Pacemaker 1/31/2015    Peritoneal dialysis catheter dysfunction (HCC)     Sleep apnea     No MACHINE    Stomach pain     Thrombocytopenia, unspecified ([facility]      Past Surgical History:   Procedure Laterality Date    CARDIAC CATHETERIZATION  2/2/15  JDT    stent to mid RCA.  EF 45%    CARPAL TUNNEL RELEASE Right     CHOLECYSTECTOMY, LAPAROSCOPIC N/A 12/8/2017    CHOLECYSTECTOMY LAPAROSCOPIC performed by Fredy Shannon MD at [address] [place] [address]  02/22/2013    Dr Tyler Rosales yr recall    COLONOSCOPY N/A 5/13/2020    Dr Sharee Stephenson, 10 yr recall    CORONARY ARTERY BYPASS GRAFT  2004    EGD  2015    DR [address] Right     HERNIA REPAIR  8098?    umbilical hernia repair    HERNIA REPAIR Right 6/23/2020    RIGHT INGUINAL HERNIA WITH MESH performed by Fredy Shannon MD at [address] [place] [address] ARTHROSCOPY Left     LAPAROSCOPY INSERTION PERITONEAL CATHETER N/A 5/26/2017    LAPAROSCOPIC INSERTION PERITONEAL DIALYSIS CATHETER WITH OMENTOPEXY performed by Isaias Lowe MD at [address] [place] [address] N/A 7/17/2017    DIAGNOSTIC LAPAROSCOPY performed by Isaias Lowe MD at [address] [place] [address] N/A 3/2/2020    DIAGNOSTIC LAPAROSCOPIC EVALUATION OF PD CATH/  LYSIS OF ADHESIONS performed by Jasmin Hendrickson MD at [address]      also pacemaker replacement    [place] COLONOSCOPY FLX DX W/COLLJ SPEC WHEN PFRMD N/A 2018    Dr HAILEY Melo-Serrated HP x 1, HP x 2, RANDOM COLON BX NEGATIVE--3 yr recall    [place] EGD TRANSORAL BIOPSY SINGLE/MULTIPLE N/A 2018    Dr Evette Melo-1cm round lesion in antrum, questionable polyp vs mass in duodenum--Repeat in 3 months (2/15/19)    UPPER GASTROINTESTINAL ENDOSCOPY N/A 2/15/2019    Dr HAILEY Melo-Peptic duodenitis, gastritis    UPPER GASTROINTESTINAL ENDOSCOPY N/A 2020    Dr Evette Melo-focal area of nodularity measuring ~8mm in stomach, previous nodularity in duodenum was stable-HP     Family History   Problem Relation Age of Onset    Heart Disease Mother     Heart Disease Father     Colon Cancer Sister     Esophageal Cancer Neg Hx     Liver Cancer Neg Hx     Liver Disease Neg Hx     Rectal Cancer Neg Hx     Stomach Cancer Neg Hx      Social History     Tobacco Use    Smoking status: Former Smoker     Packs/day: 1.50     Years: 50.00     Pack years: 75.00     Types: Cigarettes     Last attempt to quit: 10/26/2018     Years since quittin.7    Smokeless tobacco: Never Used   Substance Use Topics    Alcohol use: No       EXAMINATION:   Pulse 84, temperature 98.1 °F (36.7 °C), temperature source Temporal, height 5' 6\" (1.676 m), weight 171 lb (77.6 kg), SpO2 98 %. GENERAL:  Reveals a 78 y.o. male that  appears to be in no acute distress. HEENT:  Head is normocephalic and atraumatic. NECK:  Neck is supple without masses or carotid bruits. No obvious thyromegaly is grossly noted. CHEST:  Patient has normal respiratory effort. Chest is clear bilaterally with good thoracic expansion. HEART:  Heart demonstrated a regular rhythm and rate with no cardiac murmurs, gallops or rubs noted to auscultation. ABDOMEN:  Inspection of the abdomen demonstrated the patient to have normal bowel sounds present. The abdomen is soft and nontender. Previous abdominal incisions are healing well.   Groin exam demonstrated a right inguinal hernia no evidence of strangulation or incarceration. EXTREMITIES:  Extremities demonstrated 1+ edema edema bilaterally. PSYCHIATRIC:  Patient is oriented to time, place and person. The patient's mood and affect are normal.      IMPRESSION:    1)Right inguinal hernia without evidence of strangulation or incarceration   2)End-stage renal disease    RECOMMENDATION:    We will plan open right inguinal hernia repair with placement of PHS mesh. Risk benefits and alternatives of surgery have been discussed with he and his wife. They wish to proceed with surgery. 15 minutes of face-to-face encounter was conducted on this interview. The majority of this encounter was counseling.

## 2020-07-13 ENCOUNTER — OFFICE VISIT (OUTPATIENT)
Dept: SURGERY | Age: 70
End: 2020-07-13

## 2020-07-13 VITALS
SYSTOLIC BLOOD PRESSURE: 110 MMHG | BODY MASS INDEX: 23.75 KG/M2 | WEIGHT: 147.8 LBS | DIASTOLIC BLOOD PRESSURE: 64 MMHG | HEIGHT: 66 IN | TEMPERATURE: 97.2 F

## 2020-07-13 PROCEDURE — 99024 POSTOP FOLLOW-UP VISIT: CPT | Performed by: PHYSICIAN ASSISTANT

## 2020-07-14 ENCOUNTER — OFFICE VISIT (OUTPATIENT)
Dept: OTOLARYNGOLOGY | Age: 70
End: 2020-07-14
Payer: MEDICARE

## 2020-07-14 VITALS
HEIGHT: 66 IN | WEIGHT: 147 LBS | DIASTOLIC BLOOD PRESSURE: 62 MMHG | SYSTOLIC BLOOD PRESSURE: 114 MMHG | BODY MASS INDEX: 23.63 KG/M2

## 2020-07-14 PROBLEM — R09.A2 GLOBUS SENSATION: Status: ACTIVE | Noted: 2020-07-14

## 2020-07-14 PROBLEM — R09.89 GLOBUS SENSATION: Status: ACTIVE | Noted: 2020-07-14

## 2020-07-14 PROCEDURE — 4040F PNEUMOC VAC/ADMIN/RCVD: CPT | Performed by: PHYSICIAN ASSISTANT

## 2020-07-14 PROCEDURE — 1036F TOBACCO NON-USER: CPT | Performed by: PHYSICIAN ASSISTANT

## 2020-07-14 PROCEDURE — G8420 CALC BMI NORM PARAMETERS: HCPCS | Performed by: PHYSICIAN ASSISTANT

## 2020-07-14 PROCEDURE — G8427 DOCREV CUR MEDS BY ELIG CLIN: HCPCS | Performed by: PHYSICIAN ASSISTANT

## 2020-07-14 PROCEDURE — 1123F ACP DISCUSS/DSCN MKR DOCD: CPT | Performed by: PHYSICIAN ASSISTANT

## 2020-07-14 PROCEDURE — 3017F COLORECTAL CA SCREEN DOC REV: CPT | Performed by: PHYSICIAN ASSISTANT

## 2020-07-14 PROCEDURE — 99212 OFFICE O/P EST SF 10 MIN: CPT | Performed by: PHYSICIAN ASSISTANT

## 2020-07-14 PROCEDURE — 31575 DIAGNOSTIC LARYNGOSCOPY: CPT | Performed by: PHYSICIAN ASSISTANT

## 2020-07-14 ASSESSMENT — ENCOUNTER SYMPTOMS
TROUBLE SWALLOWING: 1
SINUS PAIN: 0
SORE THROAT: 0
PHOTOPHOBIA: 0
SINUS PRESSURE: 0
FACIAL SWELLING: 0
EYE PAIN: 0
RHINORRHEA: 0
VOICE CHANGE: 1

## 2020-07-14 NOTE — ASSESSMENT & PLAN NOTE
Globus sensation secondary to gastroesophageal reflux disease  Plan: I will place him on omeprazole 20 mg twice daily for 30 days. He is to follow-up in 1 month for reevaluation. If his symptoms continue we will consider a barium swallow for further evaluation. He has already had a recent negative upper GI endoscopy.

## 2020-07-14 NOTE — PROGRESS NOTES
[facility] OTOLARYNGOLOGY/ENT  Mr. Tisha Bridges is a pleasant 61-year-old  male that was referred by Dr. Erik Matta due to problems with dysphasia. He had upper GI endoscopy by Dr. Dave Aburto in May that was negative for any remarkable findings. He reports of having a globus sensation to the right side of his neck with dysphasia. He admits to having choking sensations to solid foods but not liquids. Due to the symptoms he has been referred for evaluation. The patient admits to being a previous smoker and used to smoke 1 pack of cigarettes per day for about 40 years. He also reports a history of alcohol usage in the past.      Allergies: Patient has no known allergies. Current Outpatient Medications   Medication Sig Dispense Refill    HYDROcodone-acetaminophen (NORCO) 5-325 MG per tablet Take 1 tablet by mouth every 6 hours as needed for Pain. 12 tablet 0    Needles & Syringes MISC 1 each by Does not apply route daily For vitamin B 12 injections 100 each 3    traZODone (DESYREL) 50 MG tablet Take 50 mg by mouth nightly      Ferric Citrate (AURYXIA) 1  MG(Fe) TABS Take by mouth daily      omeprazole (PRILOSEC) 40 MG delayed release capsule Take 1 capsule by mouth daily Take first thing daily on an empty stomach. 30 capsule 3    apixaban (ELIQUIS) 2.5 MG TABS tablet Take 2.5 mg by mouth 2 times daily      acetaminophen (TYLENOL) 500 MG tablet Take 500 mg by mouth every 6 hours as needed for Pain      gentamicin (GARAMYCIN) 0.1 % cream Apply topically daily Apply topically 3 times daily.       Ergocalciferol (VITAMIN D2 PO) Take 50,000 Units by mouth once a week Indications: 50, 000 units Tuesday      B COMPLEX-C-FOLIC ACID ER PO Take 218 mg by mouth daily       bumetanide (BUMEX) 2 MG tablet Take 2 mg by mouth daily       calcitRIOL (ROCALTROL) 0.25 MCG capsule Take 0.25 mcg by mouth daily      POTASSIUM CHLORIDE ER PO Take 25 mEq by mouth daily      fluticasone-salmeterol (ADVAIR) 100-50 MCG/DOSE diskus inhaler Inhale 1 puff into the lungs 2 times daily as needed       allopurinol (ZYLOPRIM) 100 MG tablet 100 mg 2 times daily Indications: Arthritis       amLODIPine (NORVASC) 10 MG tablet Take 5 mg by mouth daily       carvedilol (COREG) 12.5 MG tablet Take 12.5 mg by mouth 2 times daily (with meals) Indications: High Blood Pressure       SITagliptin (JANUVIA) 25 MG tablet Take 50 mg by mouth daily Indications: Diabetes       aspirin 81 MG tablet Take 81 mg by mouth daily      cyanocobalamin 1000 MCG/ML injection Inject 1 mL into the muscle daily for 5 days Then weekly x 4 weeks then monthly 9 mL 3     No current facility-administered medications for this visit. Past Surgical History:   Procedure Laterality Date    CARDIAC CATHETERIZATION  2/2/15  JDT    stent to mid RCA.  EF 45%    CARPAL TUNNEL RELEASE Right     CHOLECYSTECTOMY, LAPAROSCOPIC N/A 12/8/2017    CHOLECYSTECTOMY LAPAROSCOPIC performed by Dashawn Napoles MD at [address] [place] [address]  02/22/2013    Dr Awilda Love yr recall    COLONOSCOPY N/A 5/13/2020    Dr Alejandro Berry, 10 yr recall    CORONARY ARTERY BYPASS GRAFT  2004    EGD  2015    DR Dora [address] Right     HERNIA REPAIR  8501?    umbilical hernia repair    HERNIA REPAIR Right 6/23/2020    RIGHT INGUINAL HERNIA WITH MESH performed by Dashawn Napoles MD at [address] [place] [address] ARTHROSCOPY Left     LAPAROSCOPY INSERTION PERITONEAL CATHETER N/A 5/26/2017    LAPAROSCOPIC INSERTION PERITONEAL DIALYSIS CATHETER WITH OMENTOPEXY performed by Román Navarro MD at [address] [place] [address] N/A 7/17/2017    DIAGNOSTIC LAPAROSCOPY performed by Román Navarro MD at [address] [place] [address] N/A 3/2/2020    DIAGNOSTIC LAPAROSCOPIC EVALUATION OF PD CATH/  LYSIS OF ADHESIONS performed by Sherin Mcneal MD at [address]      also pacemaker replacement    PA COLONOSCOPY FLX DX W/COLLJ SPEC WHEN PFRMD N/A 2018    Dr Mary Melo-Serrated HP x 1, HP x 2, RANDOM COLON BX NEGATIVE--3 yr recall    RI EGD TRANSORAL BIOPSY SINGLE/MULTIPLE N/A 2018    Dr Mary Melo-1cm round lesion in antrum, questionable polyp vs mass in duodenum--Repeat in 3 months (2/15/19)    UPPER GASTROINTESTINAL ENDOSCOPY N/A 2/15/2019    Dr HAILEY Melo-Peptic duodenitis, gastritis    UPPER GASTROINTESTINAL ENDOSCOPY N/A 2020    Dr Mary Melo-focal area of nodularity measuring ~8mm in stomach, previous nodularity in duodenum was [de-identified]       Past Medical History:   Diagnosis Date    Anemia     CAD (coronary artery disease) 2015    sees [facility] heart group.  CHF (congestive heart failure) (HCC)     Chronic kidney disease (CKD), stage V ([address] 2017    Cigarette smoker 2015    COPD (chronic obstructive pulmonary disease) (HCC)     Diabetes mellitus ([address] 2015    Diarrhea     End stage renal disease ([address]     Hypercholesteremia 2015    Hypertension 2015    Pacemaker 2015    Peritoneal dialysis catheter dysfunction (HCC)     Sleep apnea     No MACHINE    Stomach pain     Thrombocytopenia, unspecified ([address]        Family History   Problem Relation Age of Onset    Heart Disease Mother     Heart Disease Father     Colon Cancer Sister     Esophageal Cancer Neg Hx     Liver Cancer Neg Hx     Liver Disease Neg Hx     Rectal Cancer Neg Hx     Stomach Cancer Neg Hx        Social History     Tobacco Use    Smoking status: Former Smoker     Packs/day: 1.50     Years: 50.00     Pack years: 75.00     Types: Cigarettes     Last attempt to quit: 10/26/2018     Years since quittin.7    Smokeless tobacco: Never Used   Substance Use Topics    Alcohol use: No           REVIEW OF SYSTEMS:  all other systems reviewed and are negative  Review of Systems   Constitutional: Negative for chills and fever. HENT: Positive for trouble swallowing and voice change.  Negative for congestion, dental reflux disease. No masses were encountered. The scope was advanced to the epiglottis where the cords were well visualized. There was no evidence of masses or polyps to the cord region. The vocal cords were noted be fully functional and symmetrical.  He was noted to have some erythematous changes around the cords again consistent with reflux disease. The patient tolerated the procedure nicely with no complications. No orders of the defined types were placed in this encounter. Electronically signed by Joanna Duvall PA-C on 7/14/20 at 4:36 PM CDT          Please note that this chart was generated using dragon dictation software. Although every effort was made to ensure the accuracy of this automated transcription, some errors in transcription may have occurred.

## 2020-07-31 NOTE — PROGRESS NOTES
Gee Desouza     :  1950    Chief Complaint   Patient presents with    Post-Op Check     RIH repair on 20        HPI:  Abdias Starr  comes today in follow-up from right inguinal hernia repair. He still complains of some pain. He is otherwise doing well. Patient Active Problem List    Diagnosis Date Noted    Globus sensation 2020    Non-recurrent unilateral inguinal hernia without obstruction or gangrene 2020    Generalized abdominal pain 2020    ESRD (end stage renal disease) on dialysis ([address] 2020    Current use of long term anticoagulation 2020    History of anemia due to chronic kidney disease 2020    Anemia in chronic kidney disease, on chronic dialysis ([address] 2020    Chronic kidney disease, stage 5 ([address]      Duodenal mass 2019    Personal history of colonic polyps 2019    S/P endoscopy 2019    S/P colonoscopic polypectomy 2019    Family history of colon cancer 2018    Diarrhea 2018    Dark stools 2018    Right lower quadrant abdominal pain 2018    S/P cholecystectomy 2018    Dysphagia 2018    Chronic heartburn 2018    Chronic nausea 2018    Weight loss 2018    Anemia of chronic kidney failure 2018    Gallstones 2017    Chronic kidney disease (CKD), stage V ([address] 2017    CAD (coronary artery disease) 2015    Diabetes mellitus ([facility] 2015    Hypertension 2015    Hypercholesteremia 2015    Cigarette smoker 2015    Pacemaker 2015     Current Outpatient Medications   Medication Sig Dispense Refill    HYDROcodone-acetaminophen (NORCO) 5-325 MG per tablet Take 1 tablet by mouth every 6 hours as needed for Pain.  12 tablet 0    cyanocobalamin 1000 MCG/ML injection Inject 1 mL into the muscle daily for 5 days Then weekly x 4 weeks then monthly 9 mL 3    Needles & Syringes MISC 1 each by Does not apply route daily For vitamin B 12 injections 100 each 3    traZODone (DESYREL) 50 MG tablet Take 50 mg by mouth nightly      Ferric Citrate (AURYXIA) 1  MG(Fe) TABS Take by mouth daily      omeprazole (PRILOSEC) 40 MG delayed release capsule Take 1 capsule by mouth daily Take first thing daily on an empty stomach. 30 capsule 3    apixaban (ELIQUIS) 2.5 MG TABS tablet Take 2.5 mg by mouth 2 times daily      acetaminophen (TYLENOL) 500 MG tablet Take 500 mg by mouth every 6 hours as needed for Pain      gentamicin (GARAMYCIN) 0.1 % cream Apply topically daily Apply topically 3 times daily.  Ergocalciferol (VITAMIN D2 PO) Take 50,000 Units by mouth once a week Indications: 50, 000 units Tuesday      B COMPLEX-C-FOLIC ACID ER PO Take 492 mg by mouth daily       bumetanide (BUMEX) 2 MG tablet Take 2 mg by mouth daily       calcitRIOL (ROCALTROL) 0.25 MCG capsule Take 0.25 mcg by mouth daily      POTASSIUM CHLORIDE ER PO Take 25 mEq by mouth daily      fluticasone-salmeterol (ADVAIR) 100-50 MCG/DOSE diskus inhaler Inhale 1 puff into the lungs 2 times daily as needed       allopurinol (ZYLOPRIM) 100 MG tablet 100 mg 2 times daily Indications: Arthritis       amLODIPine (NORVASC) 10 MG tablet Take 5 mg by mouth daily       carvedilol (COREG) 12.5 MG tablet Take 12.5 mg by mouth 2 times daily (with meals) Indications: High Blood Pressure       SITagliptin (JANUVIA) 25 MG tablet Take 50 mg by mouth daily Indications: Diabetes       aspirin 81 MG tablet Take 81 mg by mouth daily       No current facility-administered medications for this visit. Allergies: Patient has no known allergies. Past Medical History:   Diagnosis Date    Anemia     CAD (coronary artery disease) 1/31/2015    sees [facility].     CHF (congestive heart failure) (HCC)     Chronic kidney disease (CKD), stage V ([facility] 5/26/2017    Cigarette smoker 1/31/2015    COPD (chronic obstructive pulmonary disease) ([facility]     Diabetes mellitus ([facility] 1/31/2015    Diarrhea     End stage renal disease ([facility]     Hypercholesteremia 1/31/2015    Hypertension 1/31/2015    Pacemaker 1/31/2015    Peritoneal dialysis catheter dysfunction (HCC)     Sleep apnea     No MACHINE    Stomach pain     Thrombocytopenia, unspecified ([facility]      Past Surgical History:   Procedure Laterality Date    CARDIAC CATHETERIZATION  2/2/15  JDT    stent to mid RCA.  EF 45%    CARPAL TUNNEL RELEASE Right     CHOLECYSTECTOMY, LAPAROSCOPIC N/A 12/8/2017    CHOLECYSTECTOMY LAPAROSCOPIC performed by Vicki Reed MD at [address] [place] [address]  02/22/2013    Dr Basil Francois yr recall    COLONOSCOPY N/A 5/13/2020    Dr Josue Min, 10 yr recall    CORONARY ARTERY BYPASS GRAFT  2004    EGD  2015    DR Dora [address] Right     HERNIA REPAIR  9504?    umbilical hernia repair    HERNIA REPAIR Right 6/23/2020    RIGHT INGUINAL HERNIA WITH MESH performed by Vicki Reed MD at [address] [place] [address] ARTHROSCOPY Left     LAPAROSCOPY INSERTION PERITONEAL CATHETER N/A 5/26/2017    LAPAROSCOPIC INSERTION PERITONEAL DIALYSIS CATHETER WITH OMENTOPEXY performed by Prince Melly MD at [address] [place] [address] N/A 7/17/2017    DIAGNOSTIC LAPAROSCOPY performed by Prince Melly MD at [address] [place] [address] N/A 3/2/2020    DIAGNOSTIC LAPAROSCOPIC EVALUATION OF PD CATH/  LYSIS OF ADHESIONS performed by Swapnil Guidry MD at [place] [address]      also pacemaker replacement    MN COLONOSCOPY FLX DX W/COLLJ SPEC WHEN PFRMD N/A 11/13/2018    Dr HAILEY Melo-Serrated HP x 1, HP x 2, RANDOM COLON BX NEGATIVE--3 yr recall    MN EGD TRANSORAL BIOPSY SINGLE/MULTIPLE N/A 11/13/2018    Dr Pankaj Melo-1cm round lesion in antrum, questionable polyp vs mass in duodenum--Repeat in 3 months (2/15/19)    UPPER GASTROINTESTINAL ENDOSCOPY N/A 2/15/2019    Dr HAILEY Melo-Peptic duodenitis, gastritis    UPPER GASTROINTESTINAL ENDOSCOPY N/A 2020    Dr Beulah Melo-focal area of nodularity measuring ~8mm in stomach, previous nodularity in duodenum was stable-HP     Family History   Problem Relation Age of Onset    Heart Disease Mother     Heart Disease Father     Colon Cancer Sister     Esophageal Cancer Neg Hx     Liver Cancer Neg Hx     Liver Disease Neg Hx     Rectal Cancer Neg Hx     Stomach Cancer Neg Hx      Social History     Tobacco Use    Smoking status: Former Smoker     Packs/day: 1.50     Years: 50.00     Pack years: 75.00     Types: Cigarettes     Last attempt to quit: 10/26/2018     Years since quittin.7    Smokeless tobacco: Never Used   Substance Use Topics    Alcohol use: No     EXAMINATION:    Blood pressure 110/64, temperature 97.2 °F (36.2 °C), temperature source Temporal, height 5' 6\" (1.676 m), weight 147 lb 12.8 oz (67 kg). On examination, his incision iss have healed well with no appreciable recurrence. He has a normal healing ridge. IMPRESSION:    status post right inguinal hernia    RECOMMENDATION:   We will see Steff Burdick for us on a as needed basis. He can resume normal lifting at six weeks postoperatively.

## 2020-08-13 ENCOUNTER — OFFICE VISIT (OUTPATIENT)
Dept: OTOLARYNGOLOGY | Age: 70
End: 2020-08-13
Payer: MEDICARE

## 2020-08-13 VITALS
HEIGHT: 66 IN | SYSTOLIC BLOOD PRESSURE: 136 MMHG | WEIGHT: 157 LBS | DIASTOLIC BLOOD PRESSURE: 78 MMHG | BODY MASS INDEX: 25.23 KG/M2

## 2020-08-13 PROCEDURE — 4040F PNEUMOC VAC/ADMIN/RCVD: CPT | Performed by: PHYSICIAN ASSISTANT

## 2020-08-13 PROCEDURE — 3017F COLORECTAL CA SCREEN DOC REV: CPT | Performed by: PHYSICIAN ASSISTANT

## 2020-08-13 PROCEDURE — 1036F TOBACCO NON-USER: CPT | Performed by: PHYSICIAN ASSISTANT

## 2020-08-13 PROCEDURE — G8417 CALC BMI ABV UP PARAM F/U: HCPCS | Performed by: PHYSICIAN ASSISTANT

## 2020-08-13 PROCEDURE — 99213 OFFICE O/P EST LOW 20 MIN: CPT | Performed by: PHYSICIAN ASSISTANT

## 2020-08-13 PROCEDURE — 1123F ACP DISCUSS/DSCN MKR DOCD: CPT | Performed by: PHYSICIAN ASSISTANT

## 2020-08-13 PROCEDURE — G8427 DOCREV CUR MEDS BY ELIG CLIN: HCPCS | Performed by: PHYSICIAN ASSISTANT

## 2020-08-13 NOTE — PROGRESS NOTES
Mr. Wan Mcdonald is a pleasant 79-year-old  male that presents for a one-month follow-up due to dysphasia and also due to reflux disease. A flexible laryngoscopy was performed on his last visit which revealed erythematous changes around the vocal cords and the posterior pharyngeal wall consistent with reflux disease. He was treated with omeprazole twice a day. Currently he reports that his symptoms are still about the same with very little improvement with the PPI. He reports that he actually gets choked on the omeprazole capsules when he takes his medication. He has a history of recent endoscopy that was essentially unremarkable by Dr. Nitza Salcedo. Physical examination was reperformed of the neck and oral region which was unchanged from last visit. Impression: Persistent dysphasia with failed medical management for LPR as etiology    Plan: The risks, benefits, and options were discussed with the patient and 1 of his family members. I have recommended a dysphasia study to be performed for evaluation. I will call him the results of the dysphasia study once this has been completed. If this is abnormal he will need to be referred back to gastroenterology for long-term management. He is reminded to call if he has any further questions or problems.       Electronically signed by Aaron Hwang PA-C on 8/13/20 at 4:36 PM [facility]

## 2020-08-14 NOTE — TELEPHONE ENCOUNTER
PATIENTS WIFE CALLED, STATING SHE COULD NOT GET HIM TO HIS APPT . ASKED IF SHE COULD COME AT LAST APPT. I SAID YES    COULD YOU PLEASE OVERBOOK AT 11:30?  THANK YOU

## 2020-08-25 NOTE — PROGRESS NOTES
Pacemaker Evaluation Report    August 25, 2020    Indication for pacemaker: sinus node dysfunction - tachy/yesi    Implanting MD: Dr. Howe    : [employer]  Model: Adapta [id number]    Implant Date: 11/21/14    Reason For Evaluation: routine      DIAGNOSTIC DATA    Atrial paced: 56.5%  Ventricular paced: 94.2%    Battery status: satisfactory  Voltage: 2.78 V  Estimated battery life: 8 years (6-9.5)     64.5% (almost 100% per histogram). Anticoagulated with Eliquis since 12/2020.     2 VHRs for 2-4 seconds. Appears to be afib with RVR.     FINAL PARAMETERS    Changes made: none    Conclusions: normal pacemaker function, stable pacing and sensing thresholds, adequate battery reserve and atrial fibrillation burden has increased     Couldn't get [employer] to work (poor cell signal).     Return in about 6 months (around 2/25/2021) for Pacemaker Clinic [employer].

## 2020-08-26 ENCOUNTER — HOSPITAL ENCOUNTER (OUTPATIENT)
Dept: GENERAL RADIOLOGY | Age: 70
Discharge: HOME | End: 2020-08-26
Payer: MEDICARE

## 2020-08-26 ENCOUNTER — OFFICE VISIT (OUTPATIENT)
Dept: NEUROLOGY | Age: 70
End: 2020-08-26
Payer: MEDICARE

## 2020-08-26 VITALS
BODY MASS INDEX: 25.23 KG/M2 | DIASTOLIC BLOOD PRESSURE: 79 MMHG | WEIGHT: 157 LBS | HEIGHT: 66 IN | SYSTOLIC BLOOD PRESSURE: 129 MMHG | HEART RATE: 75 BPM

## 2020-08-26 PROCEDURE — 1036F TOBACCO NON-USER: CPT | Performed by: PSYCHIATRY & NEUROLOGY

## 2020-08-26 PROCEDURE — G8427 DOCREV CUR MEDS BY ELIG CLIN: HCPCS | Performed by: PSYCHIATRY & NEUROLOGY

## 2020-08-26 PROCEDURE — 74220 X-RAY XM ESOPHAGUS 1CNTRST: CPT

## 2020-08-26 PROCEDURE — 3017F COLORECTAL CA SCREEN DOC REV: CPT | Performed by: PSYCHIATRY & NEUROLOGY

## 2020-08-26 PROCEDURE — G8417 CALC BMI ABV UP PARAM F/U: HCPCS | Performed by: PSYCHIATRY & NEUROLOGY

## 2020-08-26 PROCEDURE — 1123F ACP DISCUSS/DSCN MKR DOCD: CPT | Performed by: PSYCHIATRY & NEUROLOGY

## 2020-08-26 PROCEDURE — 99214 OFFICE O/P EST MOD 30 MIN: CPT | Performed by: PSYCHIATRY & NEUROLOGY

## 2020-08-26 PROCEDURE — 4040F PNEUMOC VAC/ADMIN/RCVD: CPT | Performed by: PSYCHIATRY & NEUROLOGY

## 2020-08-26 NOTE — PROGRESS NOTES
Chief Complaint   Patient presents with    Memory Loss     3 month follow up        Shad Foster is a 78y.o. year old male who is seen for evaluation of  Memory loss and some confusion. The patient's wife indicated that over the last 2-3 months she has noticed that he has had some issues with confusion and memory problems. He has gotten up and night and has been disoriented. On one occasion he did not recall he had a pantry. He may forget conversations or misplace something. He has had issues with poor sleep over the lat 3 months or so and was started on Trazodone. He is on home peritoneal dialysis. There are no hallucinations or changes in personality. He is able to do all of his activities of daily living without difficulty. His cognitive issues do not interfere with his life. He is somewhat depressed per his wife. His son and grandson moved in with them in September. There is a family history of demential in his sister who developed it in her 66's. He has a 10 th grade education. No new issues.     Active Ambulatory Problems     Diagnosis Date Noted    CAD (coronary artery disease) 01/31/2015    Diabetes mellitus ([address] 01/31/2015    Hypertension 01/31/2015    Hypercholesteremia 01/31/2015    Cigarette smoker 01/31/2015    Pacemaker 01/31/2015    Chronic kidney disease (CKD), stage V ([address] 05/26/2017    Gallstones 12/08/2017    Anemia of chronic kidney failure 07/11/2018    Family history of colon cancer 08/28/2018    Diarrhea 08/28/2018    Dark stools 08/28/2018    Right lower quadrant abdominal pain 08/28/2018    S/P cholecystectomy 08/28/2018    Dysphagia 08/28/2018    Chronic heartburn 08/28/2018    Chronic nausea 08/28/2018    Weight loss 08/28/2018    Duodenal mass 01/16/2019    Personal history of colonic polyps 01/16/2019    S/P endoscopy 01/16/2019    S/P colonoscopic polypectomy 01/16/2019    History of anemia due to chronic kidney disease 01/14/2020    Anemia in chronic kidney disease, on chronic dialysis ([address] 01/14/2020    Chronic kidney disease, stage 5 (HCC)      Generalized abdominal pain 02/18/2020    ESRD (end stage renal disease) on dialysis ([address] 02/18/2020    Current use of long term anticoagulation 02/18/2020    Non-recurrent unilateral inguinal hernia without obstruction or gangrene 06/23/2020    Globus sensation 07/14/2020     Resolved Ambulatory Problems     Diagnosis Date Noted    No Resolved Ambulatory Problems     Past Medical History:   Diagnosis Date    Anemia     CHF (congestive heart failure) (HCC)     COPD (chronic obstructive pulmonary disease) (HCC)     End stage renal disease ([address]     Peritoneal dialysis catheter dysfunction ([address]     Sleep apnea     Stomach pain     Thrombocytopenia, unspecified ([address]        Past Surgical History:   Procedure Laterality Date    CARDIAC CATHETERIZATION  2/2/15  JDT    stent to mid RCA.  EF 45%    CARPAL TUNNEL RELEASE Right     CHOLECYSTECTOMY, LAPAROSCOPIC N/A 12/8/2017    CHOLECYSTECTOMY LAPAROSCOPIC performed by Zelalem Toure MD at [address] [place] [address]  02/22/2013    Dr Monica Santana yr recall    COLONOSCOPY N/A 5/13/2020    Dr Anju Cannon, 10 yr recall    CORONARY ARTERY BYPASS GRAFT  2004    EGD  2015    DR Dora [address] Right     HERNIA REPAIR  [id number]?    umbilical hernia repair    HERNIA REPAIR Right 6/23/2020    RIGHT INGUINAL HERNIA WITH MESH performed by Zelalem Toure MD at [address] [place] [address] ARTHROSCOPY Left     LAPAROSCOPY INSERTION PERITONEAL CATHETER N/A 5/26/2017    LAPAROSCOPIC INSERTION PERITONEAL DIALYSIS CATHETER WITH OMENTOPEXY performed by Nicole Hawk MD at [address] [place] [address] N/A 7/17/2017    DIAGNOSTIC LAPAROSCOPY performed by Nicole Hawk MD at [address] [place] [address] N/A 3/2/2020    DIAGNOSTIC LAPAROSCOPIC EVALUATION OF PD CATH/  LYSIS OF ADHESIONS performed by Baldo Lim MD at [address] [place] [address] INSERTION      also pacemaker replacement    CO COLONOSCOPY FLX DX W/COLLJ SPEC WHEN PFRMD N/A 2018    Dr HAILEY Melo-Serrated HP x 1, HP x 2, RANDOM COLON BX NEGATIVE--3 yr recall    CO EGD TRANSORAL BIOPSY SINGLE/MULTIPLE N/A 2018    Dr Jd Melo-1cm round lesion in antrum, questionable polyp vs mass in duodenum--Repeat in 3 months (2/15/19)    UPPER GASTROINTESTINAL ENDOSCOPY N/A 2/15/2019    Dr HAILEY Melo-Peptic duodenitis, gastritis    UPPER GASTROINTESTINAL ENDOSCOPY N/A 2020    Dr Jd Melo-focal area of nodularity measuring ~8mm in stomach, previous nodularity in duodenum was stable-HP       Family History   Problem Relation Age of Onset    Heart Disease Mother     Heart Disease Father     Colon Cancer Sister     Esophageal Cancer Neg Hx     Liver Cancer Neg Hx     Liver Disease Neg Hx     Rectal Cancer Neg Hx     Stomach Cancer Neg Hx        No Known Allergies    Social History     Socioeconomic History    Marital status:      Spouse name: Not on file    Number of children: Not on file    Years of education: Not on file    Highest education level: Not on file   Occupational History    Not on file   Social Needs    Financial resource strain: Not on file    Food insecurity     Worry: Not on file     Inability: Not on file    Transportation needs     Medical: Not on file     Non-medical: Not on file   Tobacco Use    Smoking status: Former Smoker     Packs/day: 1.50     Years: 50.00     Pack years: 75.00     Types: Cigarettes     Last attempt to quit: 10/26/2018     Years since quittin.8    Smokeless tobacco: Never Used   Substance and Sexual Activity    Alcohol use: No    Drug use: No    Sexual activity: Not on file   Lifestyle    Physical activity     Days per week: Not on file     Minutes per session: Not on file    Stress: Not on file   Relationships    Social connections     Talks on phone: Not on file     Gets together: Not on file     Attends [employer] once a week Indications: 50, 000 units Tuesday      B COMPLEX-C-FOLIC ACID ER PO Take 671 mg by mouth daily       bumetanide (BUMEX) 2 MG tablet Take 2 mg by mouth daily       calcitRIOL (ROCALTROL) 0.25 MCG capsule Take 0.25 mcg by mouth daily      POTASSIUM CHLORIDE ER PO Take 25 mEq by mouth daily      allopurinol (ZYLOPRIM) 100 MG tablet 100 mg 2 times daily Indications: Arthritis       amLODIPine (NORVASC) 10 MG tablet Take 5 mg by mouth daily       carvedilol (COREG) 12.5 MG tablet Take 12.5 mg by mouth 2 times daily (with meals) Indications: High Blood Pressure       SITagliptin (JANUVIA) 25 MG tablet Take 50 mg by mouth daily Indications: Diabetes       aspirin 81 MG tablet Take 81 mg by mouth daily      cyanocobalamin 1000 MCG/ML injection Inject 1 mL into the muscle daily for 5 days Then weekly x 4 weeks then monthly 9 mL 3     No current facility-administered medications for this visit. /79   Pulse 75   Ht 5' 6\" (1.676 m)   Wt 157 lb (71.2 kg)   BMI 25.34 kg/m²     Constitutional - well developed, well nourished. Eyes - conjunctiva normal.  Ear, nose, throat - No scars, masses, or lesions over external nose or ears, no atrophy of tongue  Neck-symmetric, no masses noted, no jugular vein distension  Respiration- chest wall appears symmetric, good expansion,   normal effort without use of accessory muscles  Musculoskeletal - no significant wasting of muscles noted, no bony deformities  Extremities-no clubbing, cyanosis or edema  Skin - warm, dry, and intact. No rash, erythema, or pallor.   Psychiatric - mood, affect, and behavior appear normal.      Neurological exam  Awake, alert, fluent oriented  appropriate affect  Attention and concentration appear appropriate  Recent and remote memory appears unremarkable  Speech normal without dysarthria  No clear issues with language of fund of knowledge    Cranial Nerve Exam     CN III, IV,VI-EOMI, No nystagmus, conjugate eye movements, no ptosis  CN VII-no facial assymetry        Motor Exam  antigravity throughout upper and lower extremities bilaterally    Tremors- no tremors in hands or head noted    Gait  In wheelchair    Lab Results   Component Value Date    [id number] 516 05/28/2020     Lab Results   Component Value Date    WBC 7.2 06/23/2020    HGB 13.7 (L) 06/23/2020    HCT 41.2 (L) 06/23/2020    .0 (H) 06/23/2020     (L) 06/23/2020     Lab Results   Component Value Date     06/23/2020    K 4.1 06/23/2020     06/23/2020    CO2 30 (H) 06/23/2020    BUN 30 (H) 06/23/2020    CREATININE 5.5 (H) 06/23/2020    GLUCOSE 117 (H) 06/23/2020    CALCIUM 11.2 (H) 06/23/2020    PROT 5.1 (L) 06/23/2020    LABALBU 3.2 (L) 06/23/2020    BILITOT 0.4 06/23/2020    ALKPHOS 70 06/23/2020    AST <5 06/23/2020    ALT 9 06/23/2020    LABGLOM 10 (A) 06/23/2020           Assessment    ICD-10-CM    1. Memory loss  R41.3        His neurological examination today including bedside cognitive testing was relatively unremarkable. He scored a 27/30 losing only 3 points for difficulty spelling and counting backwards which may have been limited by his 10th grade education. He was able to recall 3 words in 5 minutes. Those with Alzheimer's dementia are typically not able to do this. He was slightly slow in his cognition along with gait. Based upon his history and examination there is no clear evidence for a dementia at this time. Certainly some depression and medication effect with his Trazodone and lack of sleep could be contributing to his cognitive issues. At this time his wife was instructed to stop Trazodone and try melatonin instead. His CT of the head was unremarkable. He was unable to get an MRI as his pacemaker is not MRI compatible. His some blood work was unremarkable except for a slightly elevated methylmalonic acid level and he was started on Vitamin B12 injections.  I suspect further cognitive testing will be limited by his educational

## 2020-08-28 ENCOUNTER — TELEPHONE (OUTPATIENT)
Dept: GASTROENTEROLOGY | Age: 70
End: 2020-08-28

## 2020-08-28 RX ORDER — OMEPRAZOLE 40 MG/1
40 CAPSULE, DELAYED RELEASE ORAL DAILY
Qty: 30 CAPSULE | Refills: 3 | Status: SHIPPED | OUTPATIENT
Start: 2020-08-28 | End: 2020-09-03

## 2020-08-28 NOTE — TELEPHONE ENCOUNTER
----- Message from Smitha Riggs PA-C sent at 8/28/2020 12:38 PM CDT -----  Regarding: Pt. having dysphagia  Jayden Chough,    They were wanting you to call his daughter with recommendations. He is getting choked on a regular basis. He has seen you in the recent past.   Thanks!   Karin Colbert      ----- Message -----  From: Simon Found Incoming Radiology Results From [facility]  Sent: 8/26/2020   9:58 AM CDT  To: Smitha Riggs PA-C

## 2020-09-01 NOTE — TELEPHONE ENCOUNTER
He should eat slowly. Take small bites. Don't talk when he eats. Sit in an upright position after meals. They can use a  to puree foods to see if this consistency is easier for him to swallow.

## 2020-09-02 NOTE — TELEPHONE ENCOUNTER
9/2/20  Mrs Ana Barajas has been notified of the recommendations. She asked if you can send in a new script for his Prilosec to be taken bid?

## 2020-09-03 RX ORDER — OMEPRAZOLE 20 MG/1
20 CAPSULE, DELAYED RELEASE ORAL
Qty: 60 CAPSULE | Refills: 5 | Status: SHIPPED | OUTPATIENT
Start: 2020-09-03 | End: 2021-01-18 | Stop reason: SDUPTHER

## 2020-10-29 PROBLEM — I35.0 AORTIC STENOSIS, MILD: Status: ACTIVE | Noted: 2020-01-01

## 2020-10-29 PROBLEM — I48.0 PAROXYSMAL ATRIAL FIBRILLATION (HCC): Chronic | Status: ACTIVE | Noted: 2019-10-30

## 2020-10-29 PROBLEM — I35.0 AORTIC STENOSIS, MILD: Chronic | Status: ACTIVE | Noted: 2020-01-01

## 2020-10-29 NOTE — ASSESSMENT & PLAN NOTE
Recheck echocardiogram. Call if any chest discomfort, near syncope, or syncope especially if with exertion.

## 2020-10-29 NOTE — ASSESSMENT & PLAN NOTE
Stable. Had cardiac catheterization last year at [place], [place] was done for pre-kidney transplant evaluation. Requested copy of results for review.

## 2020-10-29 NOTE — ASSESSMENT & PLAN NOTE
Previously well controlled. Requested copy of most recent lipid panel. Will order if not done in past year.

## 2020-10-29 NOTE — ASSESSMENT & PLAN NOTE
Class II, Stage C. Compensated. Encouraged use of compression to BLEs to help with LE edema. Encouraged use of BUEs - may need compression sleeves. Protein is low due to appetite loss. Await vascular evaluation and recommendations. Continue present therapy (fluid regulated with PD and bumetanide).

## 2020-10-29 NOTE — ASSESSMENT & PLAN NOTE
Fairly well controlled with medications based on reported home readings. Continue present therapy.

## 2020-10-29 NOTE — ASSESSMENT & PLAN NOTE
Increased afib burden per 8/2020 interrogation. Rate fairly well controlled. Continue Eliquis - decrease dose if weight decreased to less than 60 kg.

## 2020-10-29 NOTE — PROGRESS NOTES
Subjective:     Encounter Date:10/29/2020    Chief Complaint:    Patient ID: Blaise Vanessa is a 70 y.o. male here today for yearly cardiac follow-up. He is accompanied by his wife.    HPI     Coronary Artery Disease      Additional comments: no chest discomfort, hx CABG 2004, last nuclear stress 2016 with inferolateral and yahaira-infarct ischemia. Wasn't eligible to have kidney transplant. Doing PD.              Congestive Heart Failure      Additional comments: chronic diastolic, hands and arms have been really swollen - going to see a vascular specialist. No orthopnea (HOB elevated 3 inches for years for multiple reasons), PND. Very little snoring. Doesn't wear oxygen at night very often. Lost a lot of weight - wasn't eating.               Hypertension      Additional comments: usually 130s/70-80s at home, hold amlodipine if SBP less than 120              Hyperlipidemia      Additional comments: recent               Atrial Fibrillation      Additional comments: increased afib burden, on Eliquis without bleeding - bruises easily          Last edited by Cindy Chavarria APRN on 10/29/2020  2:21 PM. (History)        History:   Past Medical History:   Diagnosis Date   • Arthritis    • Atrial fibrillation (CMS/HCC)    • CAD (coronary artery disease)    • Cataract    • CHF (congestive heart failure) (CMS/HCC)    • Chronic renal disease, stage IV (CMS/HCC)    • Diabetes mellitus (CMS/HCC)    • Edema    • Hyperlipidemia    • Hyperparathyroidism (CMS/HCC)    • Hypertension    • Hyperuricemia    • Peritoneal dialysis catheter in place (CMS/HCC)    • Thrombocytopenia (CMS/HCC)    • Vitamin D deficiency      Past Surgical History:   Procedure Laterality Date   • CARDIAC CATHETERIZATION  2015    [facility]   • CARDIAC PACEMAKER PLACEMENT     • CATARACT EXTRACTION, BILATERAL     • CORONARY ARTERY BYPASS GRAFT     • CORONARY STENT PLACEMENT  01/31/2015    Xience Alpine CONNIE 2.5 x 33 mm CONNIE   • HERNIA REPAIR     • KNEE  SURGERY     • WRIST SURGERY       Social History     Socioeconomic History   • Marital status:      Spouse name: Not on file   • Number of children: Not on file   • Years of education: Not on file   • Highest education level: Not on file   Occupational History   • Occupation: Disabled   Tobacco Use   • Smoking status: Former Smoker     Packs/day: 1.00     Years: 50.00     Pack years: 50.00     Types: Cigarettes     Start date: 1966   • Smokeless tobacco: Never Used   • Tobacco comment: back to 1 ppd, quit cold turkey in 2018 for over a year, up to 2 ppd, has quit for a year in the past on his own   Substance and Sexual Activity   • Alcohol use: Not Currently     Comment: previously drank beer and whiskey but not in excess   • Drug use: Never   • Sexual activity: Defer     Family History   Problem Relation Age of Onset   • Heart disease Mother    • Heart attack Mother    • Heart disease Father    • Heart attack Father      Outpatient Medications Marked as Taking for the 10/29/20 encounter (Office Visit) with Cindy Chavarria APRN   Medication Sig Dispense Refill   • acetaminophen (TYLENOL) 650 MG 8 hr tablet Take 650 mg by mouth Every 8 (Eight) Hours As Needed for Mild Pain .     • allopurinol (ZYLOPRIM) 100 MG tablet 100 mg 2 (Two) Times a Day.  5   • amLODIPine (NORVASC) 10 MG tablet Take 5 mg by mouth 2 (Two) Times a Day. 1/2 tab bid      • apixaban (Eliquis) 5 MG tablet tablet Take 1 tablet by mouth 2 (Two) Times a Day. 60 tablet 11   • aspirin 81 MG tablet Take 81 mg by mouth Daily.     • B Complex-C-Folic Acid (DIALYVITE 800 PO) Take 800 mg by mouth 2 (Two) Times a Day.     • bumetanide (BUMEX) 2 MG tablet Take 2 mg by mouth Daily.     • carvedilol (COREG) 12.5 MG tablet 12.5 mg 2 (Two) Times a Day With Meals.     • gentamicin (GARAMYCIN) 0.1 % cream Apply 0.1 application topically Daily.  3   • JANUVIA 50 MG tablet Take 50 mg by mouth Daily.  2   • megestrol (MEGACE) 40 MG tablet Take 40 mg by  "mouth Daily.     • omeprazole (priLOSEC) 40 MG capsule Take 40 mg by mouth Daily.     • ONE TOUCH ULTRA TEST test strip USE TO TEST BLOOD SUGAR DAILY  0   • POTASSIUM CHLORIDE ER PO Take 25 mEq by mouth Daily.     • traZODone (DESYREL) 100 MG tablet Every Night.     • vitamin D (ERGOCALCIFEROL) 61388 UNITS capsule capsule Take 50,000 Units by mouth Every 7 (Seven) Days.  1     Review of Systems:  Review of Systems   Constitution: Positive for malaise/fatigue (about the same ). Negative for chills and fever.   HENT: Negative for congestion and nosebleeds.    Eyes: Negative for blurred vision and double vision.   Cardiovascular: Positive for dyspnea on exertion and leg swelling. Negative for chest pain, irregular heartbeat, near-syncope, palpitations and syncope.        Hands swelling   Respiratory: Negative for cough and shortness of breath.    Endocrine: Negative for cold intolerance and heat intolerance.   Hematologic/Lymphatic: Bruises/bleeds easily.   Skin: Negative for dry skin and rash.   Musculoskeletal: Positive for arthritis and joint pain. Negative for back pain.   Gastrointestinal: Positive for diarrhea. Negative for abdominal pain and constipation.   Genitourinary: Positive for nocturia (twice).   Neurological: Positive for excessive daytime sleepiness. Negative for dizziness, headaches, light-headedness and loss of balance.   Psychiatric/Behavioral: Positive for altered mental status and memory loss. Negative for depression, hallucinations, suicidal ideas and thoughts of violence. The patient has insomnia and is nervous/anxious.         Objective:   /80 (BP Location: Left arm, Patient Position: Sitting, Cuff Size: Adult)   Pulse 86   Temp 97.3 °F (36.3 °C) (Infrared)   Ht 167.6 cm (66\")   Wt 75.9 kg (167 lb 6.4 oz)   SpO2 99%   BMI 27.02 kg/m²   Wt Readings from Last 3 Encounters:   10/29/20 75.9 kg (167 lb 6.4 oz)   09/30/19 81.4 kg (179 lb 6.4 oz)   07/16/18 79.4 kg (175 lb)       Vitals " signs reviewed.   Constitutional:       Appearance: Well-developed.   Eyes:      General: No scleral icterus.  Neck:      Vascular: Carotid bruit (faint bilateral) present. No JVD.   Pulmonary:      Effort: Pulmonary effort is normal.      Breath sounds: Decreased air movement present.   Cardiovascular:      Normal rate. Irregularly irregular rhythm.      Murmurs: There is a harsh midsystolic murmur at the URSB.      Comments: Pitting edema noted to BUEs as well  Pulses:     Intact distal pulses.   Edema:     Ankle: bilateral 2+ edema of the ankle.  Skin:     General: Skin is warm and dry.      Coloration: Skin is sallow.   Neurological:      Mental Status: Alert.      Gait: Gait abnormal.   Psychiatric:         Behavior: Behavior is cooperative.         Cognition and Memory: Cognition is impaired (mild). Exhibits impaired recent memory.       Lab/Diagnostics Review:   Lab Results   Component Value Date    INR 0.99 06/23/2020    INR 0.95 03/02/2020    INR 0.9 01/25/2018    PROTIME 13 06/23/2020    PROTIME 12.6 03/02/2020    PROTIME 11.9 01/25/2018     Lab Results   Component Value Date    WBC 7.2 06/23/2020    HGB 13.7 (L) 06/23/2020    HCT 41.2 (L) 06/23/2020    .0 (H) 06/23/2020     (L) 06/23/2020     Lab Results   Component Value Date    GLUCOSE 117 (H) 06/23/2020    BUN 30 (H) 06/23/2020    CREATININE 5.5 (H) 06/23/2020    EGFRIFNONA 10 (A) 06/23/2020    K 4.1 06/23/2020    CO2 30 (H) 06/23/2020    CALCIUM 11.2 (H) 06/23/2020    ALBUMIN 3.2 (L) 06/23/2020    AST <5 06/23/2020    ALT 9 06/23/2020     Lab Results   Component Value Date    TSH 1.350 05/28/2020     10/17/2019 Echocardiogram [facility] Dr. Guzman asymmetric septal hypertrophy, EF 55 to 60%, no dynamic obstruction.  Pseudonormal left ventricular filling pattern with concomitant abnormal relaxation and increased filling pressures consistent with grade 2 diastolic dysfunction.  Mild aortic stenosis.  Mild AI.  Ascending thoracic aorta mildly  dilated measuring up to 4.2 cm.  Mild MR.  Moderate LAE.  RV at upper limit of normal with normal systolic function.  Mild PI.    8/5/2019  CBC WBC 3620 hemoglobin 10.4 hematocrit 32.1% platelets 109,000  CMP sodium 145 potassium 3.6 chloride 108 CO2 26 BUN 43 creatinine 5.08 calcium 8.9 glucose 98 total protein 5.0 albumin 3.1     7/10/2018  CBC WBC 5050, hemoglobin 12 point 8, hematocrit 37.8%, platelets 93,000  CMP sodium 145, potassium 3.8,  chloride 108, CO2 28 , calcium 8.9, glucose 85 , total protein 5.1, albumin 3.2 , magnesium 1.8 , alkaline phosphatase 75    PTH 353  Iron 42, TIBC 255, transferrin saturation 16%     05/30/2017   CBC WBC 4460, hemoglobin 11.7, hematocrit 34.7%, platelets 91,000  Chemistry sodium 145, potassium 4, chloride 111, BUN 42, creatinine 4.61  Magnesium 2.5  Iron 33, TIBC 245, transferrin saturation 13%  Hemoglobin A1c 5.9%, folate 9.2, vitamin B12 358, vitamin D 25 OH 40.5     05/01/2017   CBC WBC 5600, 1113, hematocrit 40.4%, platelets 90,000  Renal panel  sodium 149, potassium 4, chloride 111, CO2 27.4, BUN 51, creatinine 4.87, albumin 3.5, calcium 11.7, glucose 102, phosphorus 4.5, uric acid 5     12/13/2016 lipid panel total cholesterol 128, triglycerides 126, HDL 32, LDL 71     Echocardiogram:  · 10/7/2014 Echocardiogram. [facility]. Normal LV systolic function, LVEF 60%. No regional wall motion abnormalities. Mild to moderate LVH (septal wall thickness 1.6, posterior wall thickness 1.4).Grade 2 diastolic dysfunction. Normal RV size and systolic function. Mild left atrial dilation. Aortic valve sclerosis without stenosis. Mitral annular calcification. Trace MR. Mild TR. Mild PI. Estimated RVSP 33 mmHg.  · 12/5/2016 Echocardiogram. [facility]. Normal LV Systolic Function, LVEF 55-60% grade 1 diastolic dysfunction. Normal RV size and systolic function. Mild left atrial dilation. Mild aortic valve sclerosis. Mild mitral calcification. Mild MR.     2/5/2016 Lexiscan Nuclear Stress Test. [facility].  Inferior lateral infarct with yahaira-infarct ischemia. LVEF 55%.     10/4/2016 EKG. Atrial fibrillation with ventricular pacing, rate 60 BPM.     2016 Pulmonary Function Studies. [facility]. FEV1 1.62 (62%), FVC 2.35 (73%), FEV1/FVC 69%, FEF 25-75% 0.98 (36%). TLC 4.48 (83%), vital capacity 2.47 (76%), residual volume 2.01 (96%). DLCO 12.1 (55%), DLCO/VA 4.33 (114%).     2016 Arterial Blood Gas. [facility]. PH 7.31, PCO2 45, PO2 55, bicarbonate 22.7, base excess -3.7, oxygen saturation 88.5%.     2016. PA and Lateral Chest X-Ray. [facility]. Left basilar atelectasis, pneumonitis, and/or pulmonary parenchymal scarring which is slightly improved compared to prior examination dated 10/8/2016. Sternotomy wires noted. Presence of dual chamber pacemaker     2015 cardiac catheterization 2.5x33 mm Xience Alpine drug-eluting stent to mid right coronary artery.  Patent LIMA to LAD with native LAD mid 80% stenosis.  Diffuse luminal irregularities left circumflex mid marginal branch.  Right coronary artery % stenosis with a long 90% mid right coronary artery stenosis prior to the PDA.  CARMELO widely patent nongrafted suitable for bypass grafting in the future if needed.  Occluded saphenous vein graft to the obtuse marginal.  Occluded saphenous vein graft to the PDA.  Inferior basilar akinesis with EF 45%. Keli.      ECG 12 Lead  Date/Time: 2018 9:54 AM  Performed by: ADAL MCKENNA  Authorized by: ADAL CMKENNA   Comparison: compared with previous ECG from 10/4/2016  Comparison to previous ECG: PVC is new  Rhythm: paced  Ectopy: infrequent PVCs  Rate: normal  BPM: 65  Pacin% capture  Clinical impression: abnormal ECG       ECG 12 Lead  Date/Time: 2019 9:56 AM  Performed by: Adal Mckenna APRN  Authorized by: Adal Mckenna APRN   Comparison: compared with previous ECG from 2018  Comparison to previous ECG: PVCs are no longer present  Rhythm: paced  Rate: normal  BPM:  61  Pacin% capture and dual chamber pacing  Clinical impression comment: paced ECG    ECG 12 Lead    Date/Time: 10/29/2020 2:32 PM  Performed by: Cindy Chavarria APRN  Authorized by: Cindy Chavarria APRN   Comparison: compared with previous ECG from 2019  Comparison to previous ECG: Atrial fibrillation has replaced AV pacing. Previously cited and known inferior MI. Inferolateral T wave inversions are new.  Rhythm: atrial fibrillation  Rate: normal  BPM: 70  QRS axis: normal    Clinical impression: abnormal EKG              Assessment/Plan:       Problem List Items Addressed This Visit        Cardiovascular and Mediastinum    CAD (coronary artery disease) - Primary (Chronic)    Overview     ·  Myocardial infarction  · 2015 PCI with Xience drug eluting stent placement to mid RCA 90% stenosis. Known occluded PDA and occluded SVG to PDA.  · 2016 nuclear stress inferolateral infarct with yahaira-infarct ischemia. EF 55%.         Current Assessment & Plan     Stable. Had cardiac catheterization last year at [place], [place] was done for pre-kidney transplant evaluation. Requested copy of results for review.         Relevant Medications    nitroglycerin (NITROSTAT) 0.4 MG SL tablet    Other Relevant Orders    ECG 12 Lead    Chronic diastolic heart failure (CMS/HCC) (Chronic)    Current Assessment & Plan     Class II, Stage C. Compensated. Encouraged use of compression to BLEs to help with LE edema. Encouraged use of BUEs - may need compression sleeves. Protein is low due to appetite loss. Await vascular evaluation and recommendations. Continue present therapy (fluid regulated with PD and bumetanide).           Relevant Medications    nitroglycerin (NITROSTAT) 0.4 MG SL tablet    Hypertension (Chronic)    Current Assessment & Plan     Fairly well controlled with medications based on reported home readings. Continue present therapy.         Paroxysmal atrial fibrillation (CMS/HCC) (Chronic)      Current Assessment & Plan     Increased afib burden per 8/2020 interrogation. Rate fairly well controlled. Continue Eliquis - decrease dose if weight decreased to less than 60 kg.         Relevant Medications    nitroglycerin (NITROSTAT) 0.4 MG SL tablet       Other    Aortic stenosis, mild (Chronic)    Overview     Mild 10/2019 echo with mild AI, MR, and PI.         Current Assessment & Plan     Recheck echocardiogram. Call if any chest discomfort, near syncope, or syncope especially if with exertion.         Relevant Orders    Adult Transthoracic Echo Complete W/ Cont if Necessary Per Protocol    Mixed dyslipidemia (Chronic)    Overview     · 12/13/2016 Total cholesterol 128, triglyceride 126, HDL 32, LDL 71         Current Assessment & Plan     Previously well controlled. Requested copy of most recent lipid panel. Will order if not done in past year.         S/P CABG x 3 (Chronic)    Overview     · Rubens Medrano.  2004         Relevant Medications    nitroglycerin (NITROSTAT) 0.4 MG SL tablet        Encouraged wife to discuss night time agitation with Dr. Pagan who is seeing him for memory loss and confusion.    Advance Care Planning   ACP discussion was held with the patient during this visit. Patient does not have an advance directive, information provided.    Return in about 1 year (around 10/29/2021) for Recheck.         Cindy Chavarria, APRN, ACNP-BC, CHFN-BC

## 2020-10-29 NOTE — PATIENT INSTRUCTIONS
Advance Care Planning and Advance Directives     You make decisions on a daily basis - decisions about where you want to live, your career, your home, your life. Perhaps one of the most important decisions you face is your choice for future medical care. Take time to talk with your family and your healthcare team and start planning today.  Advance Care Planning is a process that can help you:  · Understand possible future healthcare decisions in light of your own experiences  · Reflect on those decision in light of your goals and values  · Discuss your decisions with those closest to you and the healthcare professionals that care for you  · Make a plan by creating a document that reflects your wishes    Surrogate Decision Maker  In the event of a medical emergency, which has left you unable to communicate or to make your own decisions, you would need someone to make decisions for you.  It is important to discuss your preferences for medical treatment with this person while you are in good health.     Qualities of a surrogate decision maker:  • Willing to take on this role and responsibility  • Knows what you want for future medical care  • Willing to follow your wishes even if they don't agree with them  • Able to make difficult medical decisions under stressful circumstances    Advance Directives  These are legal documents you can create that will guide your healthcare team and decision maker(s) when needed. These documents can be stored in the electronic medical record.    · Living Will - a legal document to guide your care if you have a terminal condition or a serious illness and are unable to communicate. States vary by statute in document names/types, but most forms may include one or more of the following:        -  Directions regarding life-prolonging treatments        -  Directions regarding artificially provided nutrition/hydration        -  Choosing a healthcare decision maker        -  Direction  regarding organ/tissue donation    · Durable Power of  for Healthcare - this document names an -in-fact to make medical decisions for you, but it may also allow this person to make personal and financial decisions for you. Please seek the advice of an  if you need this type of document.    **Advance Directives are not required and no one may discriminate against you if you do not sign one.    Medical Orders  Many states allow specific forms/orders signed by your physician to record your wishes for medical treatment in your current state of health. This form, signed in personal communication with your physician, addresses resuscitation and other medical interventions that you may or may not want.      For more information or to schedule a time with a [facility] Advance Care Planning Facilitator contact: [email or web address] or call [phone] and someone will contact you directly.

## 2020-11-09 ENCOUNTER — TELEPHONE (OUTPATIENT)
Dept: VASCULAR SURGERY | Age: 70
End: 2020-11-09

## 2020-11-09 NOTE — TELEPHONE ENCOUNTER
Patient is having Left arm swelling and Dr. Tasia Ferro wants this looked at, with a vein mapping to see what is going on. Patient is a PD patient, this is not swelling from a dialysis access. I spoke with Mohan's wife and I tried to get her in 11/10/20 but the time did not work well for them, I tried to go over the next new patient appointment available for them to come into the office and one that is in the afternoon (per patient request). This brought us out to November 24,2020. I made her aware I was going to ask the providers if I can fit him in a spot earlier than that and Eliane Kashmir said the 11/24/20 was fine. I made the appointment and confirmed it with his wife.

## 2020-11-12 NOTE — PROGRESS NOTES
Move up f/u appt to 1 month. Give him 30 day free trial, copay card, and 2 weeks of samples of Entresto 24/26 mg - wife will  samples and card. TX!      Notified pt of worsened heart function (EF down to 35-40%) inferior wall motion abnormality, worsened AS (now moderate), and small ASD. Add Entresto 24/26 mg BID (on home peritoneal dialysis - avoid low BP). Hold amlodipine if SBP less than 100.

## 2020-11-23 ENCOUNTER — TELEPHONE (OUTPATIENT)
Dept: VASCULAR SURGERY | Age: 70
End: 2020-11-23

## 2020-11-23 NOTE — TELEPHONE ENCOUNTER
I left a vm with Johnyconsuelo Hassanlinette the pt's spouse who is listed on HIPAA, to let her know Tereso Cox has been requested to round at the hospital tomorrow morning. For this reason we are having to r/s the pt's NP appt.  I gave her the option of Dec. 2 or Dec. 3 at 2:30 pm. I asked for a returned call regarding this request.

## 2020-12-02 NOTE — TELEPHONE ENCOUNTER
12-2-20    This patient's wife Denzel Quesada called back from her work # of [phone] or her cell after 2:30 pm is [phone] and said she and her  are ready for a referral to [facility] as recommended by [facility]. Jeremias Agrawal said she wants him seen by the Doctor first at [facility], evaluated and then they determine what testing is necessary. I will forward to [facility] for [facility] to get this referral process started, see previous PE messages below. The patient's wife is asking for a return call once [facility] has this referral scheduled.   [email or web address]

## 2020-12-02 NOTE — TELEPHONE ENCOUNTER
12-2-20  TRIED CALLING JOSEPHINE GALIDNO THAT I HAVE SCHEDULED EMILIA WITH DR Pieter Leung ON Tuesday,12/8/20 @ 3PM.  HE IS TO ARRIVE AT 2:40PM W/MASK ON. WILL ALSO NEED TO BRING HIS PHOTO ID & INSURANCE CARD. SHE IS ALLOWED TO BE WITH HIM.    [address]   [address] [facility], [address] [place] [address]. [phone]    FAXED REFERRAL RECORDS -[phone], FAXED CONFIRMATION SCANNED IN MEDIA.

## 2021-01-18 ENCOUNTER — TELEPHONE (OUTPATIENT)
Dept: GASTROENTEROLOGY | Age: 71
End: 2021-01-18

## 2021-01-18 DIAGNOSIS — R11.0 CHRONIC NAUSEA: ICD-10-CM

## 2021-01-18 RX ORDER — OMEPRAZOLE 20 MG/1
20 CAPSULE, DELAYED RELEASE ORAL
Qty: 60 CAPSULE | Refills: 1 | Status: SHIPPED | OUTPATIENT
Start: 2021-01-18

## 2021-01-18 NOTE — TELEPHONE ENCOUNTER
Last OV [facility] aprn 2-18-20. No FU scheduled. Hx of CKD. Last CMP in Epic 6-23-20. Last RF [facility] 9-3-20 # 60 x 5.  [facility]

## 2021-01-19 NOTE — TELEPHONE ENCOUNTER
I called this patient, NA, I did leave a VM @ 8:01 am asking for a return call to schedule Annual OV for future medication refills per [facility] aprn.  Enrique mari

## 2021-01-19 NOTE — TELEPHONE ENCOUNTER
Ravinder Shayna received a call this morning from Iona Lion ( Wife), she said this RF request was in error that Hospice has been called in for this patient, she said she is sorry you got this in error.  [facility]

## 2021-02-18 ENCOUNTER — TELEPHONE (OUTPATIENT)
Dept: CARDIOLOGY | Facility: CLINIC | Age: 71
End: 2021-02-18
